# Patient Record
Sex: MALE | Race: OTHER | HISPANIC OR LATINO
[De-identification: names, ages, dates, MRNs, and addresses within clinical notes are randomized per-mention and may not be internally consistent; named-entity substitution may affect disease eponyms.]

---

## 2022-05-31 ENCOUNTER — APPOINTMENT (OUTPATIENT)
Age: 72
End: 2022-05-31

## 2022-05-31 PROBLEM — Z00.00 ENCOUNTER FOR PREVENTIVE HEALTH EXAMINATION: Status: ACTIVE | Noted: 2022-05-31

## 2022-06-01 ENCOUNTER — APPOINTMENT (OUTPATIENT)
Dept: UROLOGY | Facility: CLINIC | Age: 72
End: 2022-06-01
Payer: MEDICARE

## 2022-06-01 VITALS
HEART RATE: 76 BPM | SYSTOLIC BLOOD PRESSURE: 144 MMHG | TEMPERATURE: 98.3 F | WEIGHT: 185 LBS | OXYGEN SATURATION: 99 % | DIASTOLIC BLOOD PRESSURE: 79 MMHG | HEIGHT: 69 IN | BODY MASS INDEX: 27.4 KG/M2

## 2022-06-01 DIAGNOSIS — Z78.9 OTHER SPECIFIED HEALTH STATUS: ICD-10-CM

## 2022-06-01 PROCEDURE — 99204 OFFICE O/P NEW MOD 45 MIN: CPT

## 2022-06-01 NOTE — PHYSICAL EXAM
[General Appearance - Well Developed] : well developed [General Appearance - Well Nourished] : well nourished [Normal Appearance] : normal appearance [Well Groomed] : well groomed [General Appearance - In No Acute Distress] : no acute distress [Edema] : no peripheral edema [Respiration, Rhythm And Depth] : normal respiratory rhythm and effort [Exaggerated Use Of Accessory Muscles For Inspiration] : no accessory muscle use [Abdomen Soft] : soft [Abdomen Tenderness] : non-tender [Costovertebral Angle Tenderness] : no ~M costovertebral angle tenderness [Normal Station and Gait] : the gait and station were normal for the patient's age [] : no rash [No Focal Deficits] : no focal deficits [Oriented To Time, Place, And Person] : oriented to person, place, and time [Affect] : the affect was normal [Mood] : the mood was normal [Not Anxious] : not anxious [No Palpable Adenopathy] : no palpable adenopathy [Urethral Meatus] : meatus normal [Scrotum] : the scrotum was normal [Testes Mass (___cm)] : there were no testicular masses [No Prostate Nodules] : no prostate nodules [FreeTextEntry1] : HARESH slightly enlarged without nodules or induration

## 2022-06-01 NOTE — HISTORY OF PRESENT ILLNESS
[Currently Experiencing ___] :  [unfilled] [Nocturia] : nocturia [5] : 5 [Left Flank] : left flank [Left Lower Back] : left lower back [FreeTextEntry1] : Mr. Eubanks is a very pleasant 71 year old man here today for BPH and left back pain.\par He reports that he has been taking tamsulosin and finasteride prescribed by a different urologist.\par He cannot remember his name.\par He reports that his urologist no longer takes his insurance.\par He reports that his urinary stream it is mostly strong and is rarely weak.\par He denies any dysuria or hematuria.\par Reports nocturia x1-3.\par He reports he has left sided lower back pain for the past 3 months.\par Reports it worsens with relief of pressure to the area, such as when he removes his belt.\par Reports its also relieved with standing.\par He reports it feels like a discomfort.\par He denies any personal history of kidney stones.\par He reports his son has had kidney stones.\par Denies any family history of urological cancers.\par Denies any history of smoking.\par \par

## 2022-06-01 NOTE — ASSESSMENT
[FreeTextEntry1] : Mr. Eubanks is a very pleasant 71 year old man here today for BPH and left back pain.\par He reports that he has been taking tamsulosin and finasteride prescribed by a different urologist.\par He cannot remember his name.\par He reports that his urologist no longer takes his insurance.\par He reports that his urinary stream it is mostly strong and is rarely weak.\par He denies any dysuria or hematuria.\par Reports nocturia x1-3.\par He reports he has left sided lower back pain for the past 3 months.\par Reports it worsens with relief of pressure to the area, such as when he removes his belt.\par Reports its also relieved with standing.\par He reports it feels like a discomfort.\par He denies any personal history of kidney stones.\par He reports his son has had kidney stones.\par Denies any family history of urological cancers.\par Denies any history of smoking.\par HARESH slightly enlarged, no nodules or indurations.\par Would like refills of tamsulosin and finasteride today.\par We discussed risks and benefits of tamsulosin and finasteride at length\par UC.\par UA.\par PSA.\par RTO in 6 months if UA, culture, and PSA are normal

## 2022-06-02 LAB
APPEARANCE: CLEAR
BACTERIA: NEGATIVE
BILIRUBIN URINE: NEGATIVE
BLOOD URINE: ABNORMAL
COLOR: YELLOW
GLUCOSE QUALITATIVE U: NEGATIVE
HYALINE CASTS: 2 /LPF
KETONES URINE: NEGATIVE
LEUKOCYTE ESTERASE URINE: NEGATIVE
MICROSCOPIC-UA: NORMAL
NITRITE URINE: NEGATIVE
PH URINE: 6
PROTEIN URINE: NORMAL
PSA SERPL-MCNC: 4.36 NG/ML
RED BLOOD CELLS URINE: 6 /HPF
SPECIFIC GRAVITY URINE: 1.02
SQUAMOUS EPITHELIAL CELLS: 3 /HPF
UROBILINOGEN URINE: NORMAL
WHITE BLOOD CELLS URINE: 1 /HPF

## 2022-06-03 LAB — BACTERIA UR CULT: NORMAL

## 2022-06-21 ENCOUNTER — APPOINTMENT (OUTPATIENT)
Dept: UROLOGY | Facility: CLINIC | Age: 72
End: 2022-06-21

## 2022-06-21 ENCOUNTER — APPOINTMENT (OUTPATIENT)
Dept: UROLOGY | Facility: CLINIC | Age: 72
End: 2022-06-21
Payer: MEDICARE

## 2022-06-21 DIAGNOSIS — E78.00 PURE HYPERCHOLESTEROLEMIA, UNSPECIFIED: ICD-10-CM

## 2022-06-21 PROCEDURE — 76775 US EXAM ABDO BACK WALL LIM: CPT

## 2022-06-21 PROCEDURE — 99214 OFFICE O/P EST MOD 30 MIN: CPT

## 2022-06-21 NOTE — HISTORY OF PRESENT ILLNESS
[FreeTextEntry1] : Very pleasant 72-year-old gentleman presents for follow-up of BPH, screening for prostate cancer, elevated PSA, microscopic hematuria, large kidney stone.  At last visit he was found to have microscopic hematuria on urinalysis.  Additionally, PSA was noted to be elevated at 4.36.  Urinalysis demonstrated 6 red blood cells per high-powered field.  Urine culture was negative.  Because of this he underwent a renal ultrasound which demonstrates a large left kidney stone.  He reports no history of stones in the past.  No other complaints.

## 2022-06-21 NOTE — ASSESSMENT
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

## 2022-07-12 ENCOUNTER — APPOINTMENT (OUTPATIENT)
Dept: UROLOGY | Facility: CLINIC | Age: 72
End: 2022-07-12

## 2022-07-12 VITALS
WEIGHT: 186 LBS | HEART RATE: 74 BPM | DIASTOLIC BLOOD PRESSURE: 77 MMHG | HEIGHT: 69 IN | OXYGEN SATURATION: 97 % | BODY MASS INDEX: 27.55 KG/M2 | SYSTOLIC BLOOD PRESSURE: 129 MMHG | TEMPERATURE: 97.6 F

## 2022-07-12 PROCEDURE — 99214 OFFICE O/P EST MOD 30 MIN: CPT

## 2022-07-12 NOTE — ASSESSMENT
[FreeTextEntry1] : Very pleasant 72-year-old gentleman presents for follow-up of BPH, elevated PSA, microscopic hematuria, kidney stones\par -CT images reviewed demonstrating a 9 mm and 2.4 cm left intrarenal stone without hydronephrosis, renal masses, or other evidence of obstruction\par -CT also demonstrates a total prostate volume of approximately 195 cc\par -PSA 4.36\par -Discussed option to perform cystoscopy, however he wishes to forego this at this time\par -I highly recommended that he consider treatment for a large left kidney stone.  We will discussed the risks of untreated kidney stones.  I have provided him with a recommendation for provider to discuss PCNL.  We discussed differences between PCNL, ESWL, ureteroscopy with laser lithotripsy.  After thorough discussion of risks benefits of each, he is interested in a PCNL  given the significant size of the stones

## 2022-07-12 NOTE — HISTORY OF PRESENT ILLNESS
[FreeTextEntry1] : Very pleasant 72-year-old gentleman presents for follow-up of BPH, elevated PSA, large left kidney stones.  At last visit he was found to have microscopic hematuria on urinalysis.  Additionally, PSA was noted to be elevated at 4.36.  Urinalysis demonstrated 6 red blood cells per high-powered field.  Urine culture was negative.  Because of this he underwent a renal ultrasound which demonstrated a large left kidney stone.  He reports no history of stones in the past.  No other complaints.\par \par Recent CT scan demonstrated 2 left intrarenal stones measuring 9 mm and 2.4 cm.  He also demonstrated a grossly enlarged prostate with a volume of approximately 195 cc

## 2022-09-15 RX ADMIN — OXYCODONE HYDROCHLORIDE 10 MILLIGRAM(S): 5 TABLET ORAL at 17:08

## 2022-09-22 ENCOUNTER — INPATIENT (INPATIENT)
Facility: HOSPITAL | Age: 72
LOS: 11 days | Discharge: HOME HEALTH | DRG: 233 | End: 2022-10-04
Attending: STUDENT IN AN ORGANIZED HEALTH CARE EDUCATION/TRAINING PROGRAM | Admitting: STUDENT IN AN ORGANIZED HEALTH CARE EDUCATION/TRAINING PROGRAM
Payer: COMMERCIAL

## 2022-09-22 ENCOUNTER — EMERGENCY (EMERGENCY)
Facility: HOSPITAL | Age: 72
LOS: 1 days | Discharge: ANOTHER HOSPITAL | End: 2022-09-22
Attending: EMERGENCY MEDICINE
Payer: COMMERCIAL

## 2022-09-22 VITALS
RESPIRATION RATE: 16 BRPM | SYSTOLIC BLOOD PRESSURE: 134 MMHG | HEART RATE: 56 BPM | TEMPERATURE: 98 F | OXYGEN SATURATION: 94 % | DIASTOLIC BLOOD PRESSURE: 80 MMHG

## 2022-09-22 VITALS
TEMPERATURE: 98 F | RESPIRATION RATE: 16 BRPM | HEART RATE: 70 BPM | WEIGHT: 187.39 LBS | OXYGEN SATURATION: 96 % | HEIGHT: 69 IN | SYSTOLIC BLOOD PRESSURE: 164 MMHG | DIASTOLIC BLOOD PRESSURE: 91 MMHG

## 2022-09-22 DIAGNOSIS — R73.9 HYPERGLYCEMIA, UNSPECIFIED: ICD-10-CM

## 2022-09-22 DIAGNOSIS — Z29.9 ENCOUNTER FOR PROPHYLACTIC MEASURES, UNSPECIFIED: ICD-10-CM

## 2022-09-22 DIAGNOSIS — I10 ESSENTIAL (PRIMARY) HYPERTENSION: ICD-10-CM

## 2022-09-22 DIAGNOSIS — I21.4 NON-ST ELEVATION (NSTEMI) MYOCARDIAL INFARCTION: ICD-10-CM

## 2022-09-22 DIAGNOSIS — Z87.438 PERSONAL HISTORY OF OTHER DISEASES OF MALE GENITAL ORGANS: ICD-10-CM

## 2022-09-22 LAB
ALBUMIN SERPL ELPH-MCNC: 3.4 G/DL — LOW (ref 3.5–5)
ALBUMIN SERPL ELPH-MCNC: 4.2 G/DL — SIGNIFICANT CHANGE UP (ref 3.3–5)
ALP SERPL-CCNC: 50 U/L — SIGNIFICANT CHANGE UP (ref 40–120)
ALP SERPL-CCNC: 53 U/L — SIGNIFICANT CHANGE UP (ref 40–120)
ALT FLD-CCNC: 17 U/L — SIGNIFICANT CHANGE UP (ref 10–45)
ALT FLD-CCNC: 21 U/L DA — SIGNIFICANT CHANGE UP (ref 10–60)
ANION GAP SERPL CALC-SCNC: 10 MMOL/L — SIGNIFICANT CHANGE UP (ref 5–17)
ANION GAP SERPL CALC-SCNC: 6 MMOL/L — SIGNIFICANT CHANGE UP (ref 5–17)
APTT BLD: 31.7 SEC — SIGNIFICANT CHANGE UP (ref 27.5–35.5)
APTT BLD: >200 SEC — CRITICAL HIGH (ref 27.5–35.5)
AST SERPL-CCNC: 53 U/L — HIGH (ref 10–40)
AST SERPL-CCNC: 56 U/L — HIGH (ref 10–40)
BASOPHILS # BLD AUTO: 0.01 K/UL — SIGNIFICANT CHANGE UP (ref 0–0.2)
BASOPHILS # BLD AUTO: 0.01 K/UL — SIGNIFICANT CHANGE UP (ref 0–0.2)
BASOPHILS NFR BLD AUTO: 0.1 % — SIGNIFICANT CHANGE UP (ref 0–2)
BASOPHILS NFR BLD AUTO: 0.1 % — SIGNIFICANT CHANGE UP (ref 0–2)
BILIRUB SERPL-MCNC: 0.7 MG/DL — SIGNIFICANT CHANGE UP (ref 0.2–1.2)
BILIRUB SERPL-MCNC: 0.8 MG/DL — SIGNIFICANT CHANGE UP (ref 0.2–1.2)
BUN SERPL-MCNC: 14 MG/DL — SIGNIFICANT CHANGE UP (ref 7–23)
BUN SERPL-MCNC: 15 MG/DL — SIGNIFICANT CHANGE UP (ref 7–18)
CALCIUM SERPL-MCNC: 8.5 MG/DL — SIGNIFICANT CHANGE UP (ref 8.4–10.5)
CALCIUM SERPL-MCNC: 8.9 MG/DL — SIGNIFICANT CHANGE UP (ref 8.4–10.5)
CHLORIDE SERPL-SCNC: 106 MMOL/L — SIGNIFICANT CHANGE UP (ref 96–108)
CHLORIDE SERPL-SCNC: 111 MMOL/L — HIGH (ref 96–108)
CK SERPL-CCNC: 475 U/L — HIGH (ref 35–232)
CO2 SERPL-SCNC: 25 MMOL/L — SIGNIFICANT CHANGE UP (ref 22–31)
CO2 SERPL-SCNC: 26 MMOL/L — SIGNIFICANT CHANGE UP (ref 22–31)
CREAT SERPL-MCNC: 1.01 MG/DL — SIGNIFICANT CHANGE UP (ref 0.5–1.3)
CREAT SERPL-MCNC: 1.17 MG/DL — SIGNIFICANT CHANGE UP (ref 0.5–1.3)
EGFR: 65 ML/MIN/1.73M2 — SIGNIFICANT CHANGE UP
EGFR: 78 ML/MIN/1.73M2 — SIGNIFICANT CHANGE UP
EOSINOPHIL # BLD AUTO: 0 K/UL — SIGNIFICANT CHANGE UP (ref 0–0.5)
EOSINOPHIL # BLD AUTO: 0 K/UL — SIGNIFICANT CHANGE UP (ref 0–0.5)
EOSINOPHIL NFR BLD AUTO: 0 % — SIGNIFICANT CHANGE UP (ref 0–6)
EOSINOPHIL NFR BLD AUTO: 0 % — SIGNIFICANT CHANGE UP (ref 0–6)
FLUAV AG NPH QL: SIGNIFICANT CHANGE UP
FLUBV AG NPH QL: SIGNIFICANT CHANGE UP
GLUCOSE SERPL-MCNC: 141 MG/DL — HIGH (ref 70–99)
GLUCOSE SERPL-MCNC: 165 MG/DL — HIGH (ref 70–99)
HCT VFR BLD CALC: 41.7 % — SIGNIFICANT CHANGE UP (ref 39–50)
HCT VFR BLD CALC: 43.5 % — SIGNIFICANT CHANGE UP (ref 39–50)
HGB BLD-MCNC: 14 G/DL — SIGNIFICANT CHANGE UP (ref 13–17)
HGB BLD-MCNC: 14.8 G/DL — SIGNIFICANT CHANGE UP (ref 13–17)
IMM GRANULOCYTES NFR BLD AUTO: 0.3 % — SIGNIFICANT CHANGE UP (ref 0–0.9)
IMM GRANULOCYTES NFR BLD AUTO: 0.3 % — SIGNIFICANT CHANGE UP (ref 0–0.9)
INR BLD: 1 RATIO — SIGNIFICANT CHANGE UP (ref 0.88–1.16)
INR BLD: 1.21 RATIO — HIGH (ref 0.88–1.16)
LIDOCAIN IGE QN: 72 U/L — LOW (ref 73–393)
LYMPHOCYTES # BLD AUTO: 1.64 K/UL — SIGNIFICANT CHANGE UP (ref 1–3.3)
LYMPHOCYTES # BLD AUTO: 2.14 K/UL — SIGNIFICANT CHANGE UP (ref 1–3.3)
LYMPHOCYTES # BLD AUTO: 23.9 % — SIGNIFICANT CHANGE UP (ref 13–44)
LYMPHOCYTES # BLD AUTO: 30.3 % — SIGNIFICANT CHANGE UP (ref 13–44)
MAGNESIUM SERPL-MCNC: 2.1 MG/DL — SIGNIFICANT CHANGE UP (ref 1.6–2.6)
MCHC RBC-ENTMCNC: 31.3 PG — SIGNIFICANT CHANGE UP (ref 27–34)
MCHC RBC-ENTMCNC: 31.9 PG — SIGNIFICANT CHANGE UP (ref 27–34)
MCHC RBC-ENTMCNC: 33.6 GM/DL — SIGNIFICANT CHANGE UP (ref 32–36)
MCHC RBC-ENTMCNC: 34 GM/DL — SIGNIFICANT CHANGE UP (ref 32–36)
MCV RBC AUTO: 93.1 FL — SIGNIFICANT CHANGE UP (ref 80–100)
MCV RBC AUTO: 93.8 FL — SIGNIFICANT CHANGE UP (ref 80–100)
MONOCYTES # BLD AUTO: 0.53 K/UL — SIGNIFICANT CHANGE UP (ref 0–0.9)
MONOCYTES # BLD AUTO: 0.66 K/UL — SIGNIFICANT CHANGE UP (ref 0–0.9)
MONOCYTES NFR BLD AUTO: 7.7 % — SIGNIFICANT CHANGE UP (ref 2–14)
MONOCYTES NFR BLD AUTO: 9.3 % — SIGNIFICANT CHANGE UP (ref 2–14)
NEUTROPHILS # BLD AUTO: 4.24 K/UL — SIGNIFICANT CHANGE UP (ref 1.8–7.4)
NEUTROPHILS # BLD AUTO: 4.65 K/UL — SIGNIFICANT CHANGE UP (ref 1.8–7.4)
NEUTROPHILS NFR BLD AUTO: 60 % — SIGNIFICANT CHANGE UP (ref 43–77)
NEUTROPHILS NFR BLD AUTO: 68 % — SIGNIFICANT CHANGE UP (ref 43–77)
NRBC # BLD: 0 /100 WBCS — SIGNIFICANT CHANGE UP (ref 0–0)
NRBC # BLD: 0 /100 WBCS — SIGNIFICANT CHANGE UP (ref 0–0)
NT-PROBNP SERPL-SCNC: 605 PG/ML — HIGH (ref 0–450)
PLATELET # BLD AUTO: 169 K/UL — SIGNIFICANT CHANGE UP (ref 150–400)
PLATELET # BLD AUTO: 169 K/UL — SIGNIFICANT CHANGE UP (ref 150–400)
POTASSIUM SERPL-MCNC: 3.7 MMOL/L — SIGNIFICANT CHANGE UP (ref 3.5–5.3)
POTASSIUM SERPL-MCNC: 3.8 MMOL/L — SIGNIFICANT CHANGE UP (ref 3.5–5.3)
POTASSIUM SERPL-SCNC: 3.7 MMOL/L — SIGNIFICANT CHANGE UP (ref 3.5–5.3)
POTASSIUM SERPL-SCNC: 3.8 MMOL/L — SIGNIFICANT CHANGE UP (ref 3.5–5.3)
PROT SERPL-MCNC: 6.4 G/DL — SIGNIFICANT CHANGE UP (ref 6–8.3)
PROT SERPL-MCNC: 6.7 G/DL — SIGNIFICANT CHANGE UP (ref 6–8.3)
PROTHROM AB SERPL-ACNC: 11.9 SEC — SIGNIFICANT CHANGE UP (ref 10.5–13.4)
PROTHROM AB SERPL-ACNC: 13.9 SEC — HIGH (ref 10.5–13.4)
RBC # BLD: 4.48 M/UL — SIGNIFICANT CHANGE UP (ref 4.2–5.8)
RBC # BLD: 4.64 M/UL — SIGNIFICANT CHANGE UP (ref 4.2–5.8)
RBC # FLD: 11.2 % — SIGNIFICANT CHANGE UP (ref 10.3–14.5)
RBC # FLD: 11.2 % — SIGNIFICANT CHANGE UP (ref 10.3–14.5)
RSV RNA NPH QL NAA+NON-PROBE: SIGNIFICANT CHANGE UP
SARS-COV-2 RNA SPEC QL NAA+PROBE: SIGNIFICANT CHANGE UP
SARS-COV-2 RNA SPEC QL NAA+PROBE: SIGNIFICANT CHANGE UP
SODIUM SERPL-SCNC: 141 MMOL/L — SIGNIFICANT CHANGE UP (ref 135–145)
SODIUM SERPL-SCNC: 143 MMOL/L — SIGNIFICANT CHANGE UP (ref 135–145)
TROPONIN I, HIGH SENSITIVITY RESULT: 6451.2 NG/L — HIGH
TROPONIN T, HIGH SENSITIVITY RESULT: 618 NG/L — HIGH (ref 0–51)
WBC # BLD: 6.85 K/UL — SIGNIFICANT CHANGE UP (ref 3.8–10.5)
WBC # BLD: 7.07 K/UL — SIGNIFICANT CHANGE UP (ref 3.8–10.5)
WBC # FLD AUTO: 6.85 K/UL — SIGNIFICANT CHANGE UP (ref 3.8–10.5)
WBC # FLD AUTO: 7.07 K/UL — SIGNIFICANT CHANGE UP (ref 3.8–10.5)

## 2022-09-22 PROCEDURE — 99223 1ST HOSP IP/OBS HIGH 75: CPT

## 2022-09-22 PROCEDURE — 93010 ELECTROCARDIOGRAM REPORT: CPT

## 2022-09-22 PROCEDURE — 99292 CRITICAL CARE ADDL 30 MIN: CPT

## 2022-09-22 PROCEDURE — 99291 CRITICAL CARE FIRST HOUR: CPT

## 2022-09-22 PROCEDURE — 71045 X-RAY EXAM CHEST 1 VIEW: CPT | Mod: 26

## 2022-09-22 RX ORDER — CLOPIDOGREL BISULFATE 75 MG/1
75 TABLET, FILM COATED ORAL ONCE
Refills: 0 | Status: COMPLETED | OUTPATIENT
Start: 2022-09-22 | End: 2022-09-22

## 2022-09-22 RX ORDER — SODIUM CHLORIDE 9 MG/ML
1000 INJECTION INTRAMUSCULAR; INTRAVENOUS; SUBCUTANEOUS
Refills: 0 | Status: DISCONTINUED | OUTPATIENT
Start: 2022-09-22 | End: 2022-09-27

## 2022-09-22 RX ORDER — NITROGLYCERIN 6.5 MG
0.4 CAPSULE, EXTENDED RELEASE ORAL ONCE
Refills: 0 | Status: COMPLETED | OUTPATIENT
Start: 2022-09-22 | End: 2022-09-22

## 2022-09-22 RX ORDER — DEXTROSE 50 % IN WATER 50 %
25 SYRINGE (ML) INTRAVENOUS ONCE
Refills: 0 | Status: DISCONTINUED | OUTPATIENT
Start: 2022-09-22 | End: 2022-09-23

## 2022-09-22 RX ORDER — INSULIN LISPRO 100/ML
VIAL (ML) SUBCUTANEOUS EVERY 6 HOURS
Refills: 0 | Status: DISCONTINUED | OUTPATIENT
Start: 2022-09-22 | End: 2022-09-23

## 2022-09-22 RX ORDER — DEXTROSE 50 % IN WATER 50 %
15 SYRINGE (ML) INTRAVENOUS ONCE
Refills: 0 | Status: DISCONTINUED | OUTPATIENT
Start: 2022-09-22 | End: 2022-09-23

## 2022-09-22 RX ORDER — ASPIRIN/CALCIUM CARB/MAGNESIUM 324 MG
162 TABLET ORAL ONCE
Refills: 0 | Status: COMPLETED | OUTPATIENT
Start: 2022-09-22 | End: 2022-09-22

## 2022-09-22 RX ORDER — HEPARIN SODIUM 5000 [USP'U]/ML
INJECTION INTRAVENOUS; SUBCUTANEOUS
Qty: 25000 | Refills: 0 | Status: DISCONTINUED | OUTPATIENT
Start: 2022-09-22 | End: 2022-09-26

## 2022-09-22 RX ORDER — GLUCAGON INJECTION, SOLUTION 0.5 MG/.1ML
1 INJECTION, SOLUTION SUBCUTANEOUS ONCE
Refills: 0 | Status: DISCONTINUED | OUTPATIENT
Start: 2022-09-22 | End: 2022-09-23

## 2022-09-22 RX ORDER — HEPARIN SODIUM 5000 [USP'U]/ML
INJECTION INTRAVENOUS; SUBCUTANEOUS
Qty: 25000 | Refills: 0 | Status: DISCONTINUED | OUTPATIENT
Start: 2022-09-22 | End: 2022-09-24

## 2022-09-22 RX ORDER — SODIUM CHLORIDE 9 MG/ML
1000 INJECTION, SOLUTION INTRAVENOUS
Refills: 0 | Status: DISCONTINUED | OUTPATIENT
Start: 2022-09-22 | End: 2022-09-23

## 2022-09-22 RX ORDER — ASPIRIN/CALCIUM CARB/MAGNESIUM 324 MG
81 TABLET ORAL DAILY
Refills: 0 | Status: DISCONTINUED | OUTPATIENT
Start: 2022-09-23 | End: 2022-09-27

## 2022-09-22 RX ORDER — HEPARIN SODIUM 5000 [USP'U]/ML
5000 INJECTION INTRAVENOUS; SUBCUTANEOUS EVERY 6 HOURS
Refills: 0 | Status: DISCONTINUED | OUTPATIENT
Start: 2022-09-22 | End: 2022-09-24

## 2022-09-22 RX ORDER — HEPARIN SODIUM 5000 [USP'U]/ML
5000 INJECTION INTRAVENOUS; SUBCUTANEOUS ONCE
Refills: 0 | Status: COMPLETED | OUTPATIENT
Start: 2022-09-22 | End: 2022-09-22

## 2022-09-22 RX ORDER — DEXTROSE 50 % IN WATER 50 %
12.5 SYRINGE (ML) INTRAVENOUS ONCE
Refills: 0 | Status: DISCONTINUED | OUTPATIENT
Start: 2022-09-22 | End: 2022-09-23

## 2022-09-22 RX ORDER — TAMSULOSIN HYDROCHLORIDE 0.4 MG/1
0.4 CAPSULE ORAL AT BEDTIME
Refills: 0 | Status: DISCONTINUED | OUTPATIENT
Start: 2022-09-22 | End: 2022-09-27

## 2022-09-22 RX ORDER — ASPIRIN/CALCIUM CARB/MAGNESIUM 324 MG
81 TABLET ORAL
Refills: 0 | Status: DISCONTINUED | OUTPATIENT
Start: 2022-09-22 | End: 2022-09-22

## 2022-09-22 RX ORDER — HEPARIN SODIUM 5000 [USP'U]/ML
5000 INJECTION INTRAVENOUS; SUBCUTANEOUS EVERY 6 HOURS
Refills: 0 | Status: DISCONTINUED | OUTPATIENT
Start: 2022-09-22 | End: 2022-09-26

## 2022-09-22 RX ORDER — FINASTERIDE 5 MG/1
5 TABLET, FILM COATED ORAL DAILY
Refills: 0 | Status: DISCONTINUED | OUTPATIENT
Start: 2022-09-22 | End: 2022-09-27

## 2022-09-22 RX ORDER — HEPARIN SODIUM 5000 [USP'U]/ML
5000 INJECTION INTRAVENOUS; SUBCUTANEOUS EVERY 6 HOURS
Refills: 0 | Status: DISCONTINUED | OUTPATIENT
Start: 2022-09-22 | End: 2022-09-22

## 2022-09-22 RX ORDER — LANOLIN ALCOHOL/MO/W.PET/CERES
3 CREAM (GRAM) TOPICAL AT BEDTIME
Refills: 0 | Status: DISCONTINUED | OUTPATIENT
Start: 2022-09-22 | End: 2022-09-27

## 2022-09-22 RX ADMIN — Medication 162 MILLIGRAM(S): at 18:38

## 2022-09-22 RX ADMIN — HEPARIN SODIUM 1000 UNIT(S)/HR: 5000 INJECTION INTRAVENOUS; SUBCUTANEOUS at 18:08

## 2022-09-22 RX ADMIN — CLOPIDOGREL BISULFATE 75 MILLIGRAM(S): 75 TABLET, FILM COATED ORAL at 18:10

## 2022-09-22 RX ADMIN — HEPARIN SODIUM 5000 UNIT(S): 5000 INJECTION INTRAVENOUS; SUBCUTANEOUS at 18:06

## 2022-09-22 RX ADMIN — HEPARIN SODIUM 1000 UNIT(S)/HR: 5000 INJECTION INTRAVENOUS; SUBCUTANEOUS at 23:51

## 2022-09-22 RX ADMIN — Medication 0.4 MILLIGRAM(S): at 18:38

## 2022-09-22 RX ADMIN — Medication 162 MILLIGRAM(S): at 16:15

## 2022-09-22 NOTE — H&P ADULT - HISTORY OF PRESENT ILLNESS
Patient is a 72y Male with PMH of BPH, HTN, presented to OSH with chest pain x1 day, transferred to [facility] for NSTEMI. History obtained Patient was woken up last night around midnight with sudden-onset left-sided chest pain. The pain did not radiate anywhere else, was not associated with SOB or diaphoresis. He did have some mild dizziness. At OSH he was loaded with aspirin and plavix and received nitroglycerin x2, chest pain is now resolved. Patient denies fever, chills, abdominal pain, nausea, vomiting, changes in bowel habits, or urinary symptoms. He has no personal or family history of heart problems, never smoked cigarettes.     In the ED, afebrile, VSS. Labs significant for trop 618. CXR shows mild atelectasis at the lung bases b/l. Patient is a 72y Male with PMH of BPH, HTN, presented to OSH with chest pain x1 day, transferred to [facility] for NSTEMI. History obtained from patient using  (PI #[id number]). Patient was woken up last night around midnight with sudden-onset left-sided chest pain. The pain did not radiate anywhere else, was not associated with SOB or diaphoresis. He did have some mild dizziness. At OSH he was loaded with aspirin and plavix and received nitroglycerin x2, chest pain is now resolved. Patient denies fever, chills, abdominal pain, nausea, vomiting, changes in bowel habits, or urinary symptoms. He has no personal or family history of heart problems, never smoked cigarettes.     In the ED, afebrile, VSS. Labs significant for trop 618. CXR shows mild atelectasis at the lung bases b/l. Started on heparin gtt.

## 2022-09-22 NOTE — ED PROVIDER NOTE - PHYSICAL EXAMINATION
PHYSICAL EXAMINATION:  GENERAL: NAD, well-developed, well-groomed  HEAD:  Atraumatic, Normocephalic  EYES:  conjunctiva and sclera clear  NECK: Supple, No JVD, Normal thyroid  CHEST/LUNG: Clear to auscultation. Clear to percussion bilaterally; No rales, rhonchi, wheezing, or rubs  HEART: Regular rate and rhythm; No murmurs, rubs, or gallops  ABDOMEN: Soft, Nontender, Nondistended; Bowel sounds present  NERVOUS SYSTEM:  Alert & Oriented X3,    EXTREMITIES: +1 edema in B/L LL 2+ Peripheral Pulses, No clubbing, or cyanosis   SKIN: warm dry

## 2022-09-22 NOTE — CONSULT NOTE ADULT - ASSESSMENT
Patient is a 72y M PMHx BPH p/w chest pain, transferred from  for concern of NSTEMI. He has elevated troponin and normal EKG. Now patient's chest pain is resolved.     #NSTEMI  Patient is hemodynamically stable and chest pain free. His EKG is not concerning for any ST elevation  -Continue with herparin drip ACS protocol  -Patient was loaded with  and plavix 600mg  -Continue patient on ASA 81mg daily  -Will discuss with interventional attending concerning cath in the morning  -Please keep patient NPO at midnight  -Please obtain type and screen, pre-op labs    Marva Cortes MD  Cardiology fellow Patient is a 72y M PMHx BPH p/w chest pain, transferred from  for concern of NSTEMI. He has elevated troponin and normal EKG. Now patient's chest pain is resolved.     #NSTEMI  Patient is hemodynamically stable and chest pain free. His EKG is not concerning for any ST elevation  -Continue with heparin drip ACS protocol  -Patient was loaded with  and plavix 600mg  -Continue patient on ASA 81mg daily  -Will discuss with interventional attending concerning cath in the morning  -Please keep patient NPO at midnight  -Please obtain type and screen, pre-op labs  -Trend trop q6 hours until downtrend or peak    Marva Cortes MD  Cardiology fellow

## 2022-09-22 NOTE — ED PROVIDER NOTE - ATTENDING CONTRIBUTION TO CARE
72 y.o. male 72 y.o. male c/o constant nonradiating MSChest pressure since 11am yest., sob, no diaphoresis, dizziness, n/v, pt's vaccinated for COVID.  Pt had similar pain 1 mo. ago 8/21, did not seek any med. advice.  No FH CAD  PE: in no distress, Heart- S1S2RR, Lungs-clear, abd-soft, NT, ND, Ext- no C/E/C, no calves tenderness   Pt with chest pressure, will get EKG, Trop, reassess

## 2022-09-22 NOTE — ED ADULT NURSE NOTE - NSIMPLEMENTINTERV_GEN_ALL_ED
Implemented All Universal Safety Interventions:  [place] to call system. Call bell, personal items and telephone within reach. Instruct patient to call for assistance. Room bathroom lighting operational. Non-slip footwear when patient is off stretcher. Physically safe environment: no spills, clutter or unnecessary equipment. Stretcher in lowest position, wheels locked, appropriate side rails in place.

## 2022-09-22 NOTE — ED PROVIDER NOTE - ATTENDING CONTRIBUTION TO CARE
Patient is a 73 yo M with history of BPH, history of hernia repair, transferred from [place] for cardiac cath evaluation as patient is having an NSTEMI. Patient is [place] speaking and I spoke to him via , [phone]. Accordingly, patient states he developed chest pain last night around 10-11 pm. Chest pain was like a tightness, lasted the whole night. No nausea or diaphoresis. Denies a history of chest pain with exertion, HADLEY, leg swelling. At , his troponin I was 6451.2 and CK was 475. He received aspirin, plavix, sublingual NG and was started on heparin. Cardiology fellow was contacted for transfer. Patient reports overall improvement in his pain in the last few hours. Denies any recent infections. No recent fevers, chills, nausea, vomiting, or diarrhea.    VS noted  Gen. no acute distress, Non toxic   HEENT: EOMI, mmm  Lungs: CTAB/L no C/ W /R   CVS: RRR   Abd; Soft non tender, non distended   Ext: no edema  Skin: no rash  Neuro AAOx3 non focal clear speech  a/p: NSTEMI - ekg here with twi in lead 3 otherwise no st elevations or depressions. Patient with stable VS. He is receiving heparin. Plan for repeat labs, cardiology consult. SOILA.   Shea Flowers MD

## 2022-09-22 NOTE — H&P ADULT - PROBLEM SELECTOR PLAN 1
- trop 618, EKG w/ NSR and no ischemic changes, currently CP resolved  - appreciate cards recs: Will discuss with interventional attending concerning cath in the morning  - s/p aspirin/plavix load at OSH  - c/w aspirin   - hold plavix for now as per cards  - c/w heparin gtt  - trend troponins to peak  - check LFTs, lipid profile, TSH, and A1c  - maintain K > 4, Mg > 2  - continue to monitor on telemetry  - TTE in the AM  - NPO for possible cath in AM  - gentle IVF while NPO

## 2022-09-22 NOTE — ED PROVIDER NOTE - CLINICAL SUMMARY MEDICAL DECISION MAKING FREE TEXT BOX
Patient is a 72y M PMHx BPH p/w chest pain, transferred from  with NSTEMI, trop 6500. Cardiology consulted, will see patient at bedside. Will repeat labs, trop, ekg. CXR at OSH shows no PNA, no PTX, no widened mediastinum. Patient on heparin.

## 2022-09-22 NOTE — ED PROVIDER NOTE - CHPI ED SYMPTOMS NEG
no back pain/no cough/no fever/no nausea/no shortness of breath/no syncope/no vomiting/no chills/no diaphoresis

## 2022-09-22 NOTE — CONSULT NOTE ADULT - ATTENDING COMMENTS
72 year old man with NSTEMI, chest pain currently resolved, troponin rising, EKG non-specific, suggestive of inferolateral ischemia. Plan coronary angiogram early today.    To contact call Cardiology Fellow or Attending as listed on [email or web address] password: [email or web address].

## 2022-09-22 NOTE — H&P ADULT - NSHPLABSRESULTS_GEN_ALL_CORE
EKG personally reviewed by me: NSR, no ischemic changes.  Imaging personally reviewed by me including:      - CXR: grossly clear, mild atelectasis at bases

## 2022-09-22 NOTE — ED PROVIDER NOTE - PROGRESS NOTE DETAILS
pt with NSTEMI, will transfer pt.  Given NTG CP 7-4, Pt's Trop 6451, will transfer pt.  Case d/w cardio fellow Dr. Cortes, will transfer to ED

## 2022-09-22 NOTE — H&P ADULT - ASSESSMENT
Patient is a 72y Male with PMH of BPH, HTN, presented to OSH with chest pain x1 day, found to have elevated troponins w/ unremarkable EKG, transferred to [facility] for NSTEMI.

## 2022-09-22 NOTE — H&P ADULT - NSHPPHYSICALEXAM_GEN_ALL_CORE
T(C): 36.8 (09-22-22 @ 19:51), Max: 36.8 (09-22-22 @ 18:45)  HR: 60 (09-22-22 @ 19:51) (56 - 60)  BP: 133/84 (09-22-22 @ 19:51) (133/84 - 134/80)  RR: 17 (09-22-22 @ 19:51) (16 - 17)  SpO2: 96% (09-22-22 @ 19:51) (94% - 96%)    Constitutional: no acute distress, laying in bed comfortably  ENMT: atraumatic, normocephalic, no lymphadenopathy  Eyes: pupils equally round and reactive to light, extraocular muscles intact, no conjunctival injection  Cardio: regular rate and rhythm, normal S1/S2, no murmurs, rubs, or gallops  Respiratory: lungs clear to auscultation bilaterally, no rales, rhonchi, or wheezes  GI: soft, nontender, nondistended, BSx4  MSK: extremities atraumatic, no cyanosis or clubbing  Skin: warm, dry, no rashes or lesions  Neuro: no focal deficits, sensation grossly intact  Psych: alert and oriented x3, normal behavior, appropriate mood and affect

## 2022-09-22 NOTE — ED PROVIDER NOTE - PHYSICAL EXAMINATION
GENERAL: no acute distress, non-toxic appearing  HEAD: normocephalic, atraumatic  HEENT: PERRLA, EOMI  CARDIAC: regular rate and rhythm, no appreciable murmurs  PULM: clear to ascultation bilaterally, no crackles, rales, rhonchi, or wheezing  GI: abdomen nondistended, soft, nontender  NEURO: alert and oriented x 3, normal speech, no gross neurologic deficit  MSK: no visible deformities, no peripheral edema, calf tenderness/redness/swelling  SKIN: no visible rashes, dry, well-perfused  PSYCH: appropriate mood and affect

## 2022-09-22 NOTE — ED PROVIDER NOTE - OBJECTIVE STATEMENT
Patient is a 72y M PMHx BPH p/w chest pain, transferred from  with NSTEMI, trop 6500. Patient states CP started last night, now resolving 3/10 pain. Patient received full ASA, plavix, one dose nitro at . Started on heparin, currently on heparin in room. Patient denies SOB, fevers, pleuritic chest pain, numbness, back pain, abdominal pain, vomiting, recent illness.

## 2022-09-22 NOTE — ED PROVIDER NOTE - PROGRESS NOTE DETAILS
Deja Guo MD PGY2: Cardiology aware. Recommend second nitro dose. Will see patient at bedside. Patient to be placed on cardiac monitor. Deja Guo MD PGY2: Cardiology saw patient at bedside. Patient TBA to hospitalist.

## 2022-09-22 NOTE — H&P ADULT - NSHPREVIEWOFSYSTEMS_GEN_ALL_CORE
General: no weight change, no fever, no chills, no night sweats, no fatigue  Skin: no rash, no itching, no dryness, no hair loss  Opthalmologic: no eye pain, no eye redness, no eye swelling, no vision changes  ENMT: no hearing changes, no ear pain, no tinnitus, no vertigo, no nasal congestion, no sore throat, no dysphagia  Respiratory and thorax: no shortness of breath, no cough, no wheezing, no hemoptysis, no pleuritic chest pain  Cardiovascular: + chest pain, no dyspnea on exertion, no orthopnea, no palpitations, no peripheral edema  Gastrointestinal: no abdominal pain, no nausea, no vomiting, no diarrhea, no constipation  Genitourinary: no dysuria, no urinary frequency or hesitancy, no hematuria  Musculoskeletal: no pain, no lower extremity edema, no traumatic injury  Neurological: no weakness, no numbness, no loss of consciousness, no syncope, + dizziness, no headache  Psychiatric: no depression, no anxiety, no mood swings  Endo: no heat or cold intolerance, no hirsutism, no polyuria, no polydipsia

## 2022-09-22 NOTE — ED ADULT NURSE NOTE - OBJECTIVE STATEMENT
Patient is a 72y M PMHx BPH c/c transferred from  with NSTEMI, trop 6500. Patient states CP started last night, now resolving 1/10 pain. Patient received full ASA, Plavix, one dose nitro at . Started on heparin, currently on heparin in room. Heparin started at 1820 at 1000units/hr. Patient denies CP, SOB, fevers, pleuritic chest pain. Placed on cardiac monitor.

## 2022-09-22 NOTE — ED PROVIDER NOTE - CHIEF COMPLAINT
HPI


Chief Complaint:  Flank/Kidney Pain


Time Seen by Provider:  20:12


Travel History


International Travel<30 days:  No


Contact w/Intl Traveler<30days:  No


Traveled to known affect area:  No





History of Present Illness


HPI


39-year-old male with history of diabetes presents for evaluation of abdominal/

left flank pain.  Symptoms started earlier today.  The pain is a sharp pain 

that seems to radiate from the left flank down to the left lower quadrant.  

Pain comes and goes in waves.  No obvious aggravating or relieving factors.  He 

denies nausea, vomiting, diarrhea, constipation, dysuria, testicular scrotal 

pain, fevers or chills.  He reports that earlier today when he was urinating 

was brown/red tinged but the last time that he urinated in order to make sample 

here it was yellow.  No history of kidney stones.  No other complaints at this 

time.





PFSH


Past Medical History


Hx Anticoagulant Therapy:  No


Arthritis:  No


Asthma:  Yes (COMES AND GOES)


Autoimmune Disease:  No


Bipolar Disorder:  Yes


Anxiety:  Yes


Depression:  Yes


Heart Rhythm Problems:  No


Cancer:  No


Cardiovascular Problems:  No


High Cholesterol:  No


Chemotherapy:  No


Chest Pain:  No


Congestive Heart Failure:  No


COPD:  No


Cerebrovascular Accident:  No


Diabetes:  Yes


Diminished Hearing:  No


Endocrine:  Yes (pancreatitis)


Gastrointestinal Disorders:  Yes (HX OF ULCERS)


GERD:  Yes


Genitourinary:  No


Headaches:  No


Hiatal Hernia:  No


Heparin Induced Thrombocytopen:  No


Hypertension:  No


Immune Disorder:  No


Implanted Vascular Access Dvce:  No


Kidney Stones:  No


Musculoskeletal:  No


Neurologic:  Yes


Psychiatric:  Yes (dyslexia)


Reproductive:  No


Respiratory:  No


Migraines:  No


Pancreatitis:  Yes


Radiation Therapy:  No


Renal Failure:  No


Seizures:  Yes


Sickle Cell Disease:  No


Sleep Apnea:  No


Ulcer:  Yes





Past Surgical History


Abdominal Surgery:  No


AICD:  No


Arteriovenous Shunt:  No


Cardiac Surgery:  No


Ear Surgery:  No


Endocrine Surgery:  No


Eye Surgery:  No


Genitourinary Surgery:  No


Gynecologic Surgery:  No


Hysterectomy:  No


Insulin Pump:  No


Joint Replacement:  No


Neurologic Surgery:  No


Pacemaker:  No


Thoracic Surgery:  No


Other Surgery:  No





Social History


Alcohol Use:  No (RARE)


Tobacco Use:  No


Substance Use:  Yes (WEED ONCE IN A WHILE )





Allergies-Medications


(Allergen,Severity, Reaction):  


Coded Allergies:  


     Pork/Porcine Containing Products (Verified  Allergy, Severe, ALLERGIC TO 

PORK INSULIN ONLY, 6/15/18)


     insulin aspart (Verified  Allergy, Severe, ITCHING, RASH TO PORK INSULIN 

ONLY, 6/15/18)


     insulin aspart protamine human (Verified  Allergy, Severe, ITCHING, RASH 

TO PORK INSULIN ONLY, 6/15/18)


     insulin detemir (Verified  Allergy, Severe, ITCHING, RASH TO PORK INSULIN 

ONLY, 6/15/18)


     insulin glargine (Verified  Allergy, Severe, ITCHING, RASH TO PORK INSULIN 

ONLY, 6/15/18)


     insulin isophane (NPH) (Verified  Allergy, Severe, ITCHING, RASH TO PORK 

INSULIN ONLY, 6/15/18)


     insulin lispro (Verified  Allergy, Severe, ITCHING, RASH TO PORK INSULIN 

ONLY, 6/15/18)


     insulin regular (Verified  Allergy, Severe, ITCHING, RASH TO PORK INSULIN 

ONLY, 6/15/18)


     penicillin G (Verified  Allergy, Severe, HIVES, 6/15/18)


Reported Meds & Prescriptions





Reported Meds & Active Scripts


Active


Bactrim DS (Sulfamethoxazole-Trimethoprim) 800-160 Mg Tab 1 Tab PO BID 14 Days


Zofran Odt (Ondansetron Odt) 4 Mg Tab 4 Mg SL Q6HR PRN


Metformin (Metformin HCl) 500 Mg Tab 500 Mg PO BIDPC


Novolog Inj (Insulin Aspart) 1,000 Unit/10 Ml Vial 1-9 Units SQ ACHS


     Max dose at bedtime:( )units; sugars less than 70,(0)units; sugars


     150-199,(1) unit; sugars 200-249,(3) units; sugars 250-299,(5) units;


     sugars 300-349,(7) units; sugars greater than 349,(9) units


Levemir Inj (Insulin Detemir) 1,000 unit/ 10 ML Vial 12 Units SQ HS 30 Days


     Do not mix with any other Insulin.


Reported


Trazodone (Trazodone HCl) 50 Mg Tab 50 Mg PO HS


Seroquel (Quetiapine Fumarate) 50 Mg Tab 50 Mg PO HS


Keppra (Levetiracetam) 750 Mg Tab 750 Mg PO DAILY








Review of Systems


Except as stated in HPI:  all other systems reviewed are Neg





Physical Exam


Narrative


GENERAL: Well-developed well-nourished male who appears uncomfortable on 

initial examination.


SKIN: Warm and dry.


HEAD: Atraumatic. Normocephalic. 


EYES: Pupils equal and round. No scleral icterus. No injection or drainage. 


ENT: No nasal bleeding or discharge.  Mucous membranes pink and moist.


NECK: Trachea midline. No JVD. 


CARDIOVASCULAR: Regular rate and rhythm.  No murmur appreciated.


RESPIRATORY: No accessory muscle use. Clear to auscultation. Breath sounds 

equal bilaterally. 


GASTROINTESTINAL: Abdomen soft, left lower quadrant tenderness without 

guarding.  Left CVA tenderness is present.   examination reveals descended 

testicles which are nontender.


MUSCULOSKELETAL: No obvious deformities. No clubbing.  No cyanosis.  No edema. 


NEUROLOGICAL: Awake and alert. No obvious cranial nerve deficits.  Motor 

grossly within normal limits. Normal speech.





Data


Data


Last Documented VS





Vital Signs








  Date Time  Temp Pulse Resp B/P (MAP) Pulse Ox O2 Delivery O2 Flow Rate FiO2


 


6/15/18 20:40  106 18 113/74 (87) 98 Room Air  


 


6/15/18 17:00 98.0       








Orders





 Orders


Urinalysis - C+S If Indicated (6/15/18 16:59)


Complete Blood Count With Diff (6/15/18 20:15)


Comprehensive Metabolic Panel (6/15/18 20:15)


Ct Abd/Pel W/O Iv Contrast (6/15/18 20:15)


Ecg Monitoring (6/15/18 20:15)


Iv Access Insert/Monitor (6/15/18 20:15)


Ketorolac Inj (Toradol Inj) (6/15/18 20:15)


Morphine Inj (Morphine Inj) (6/15/18 20:15)


Sodium Chloride 0.9% Flush (Ns Flush) (6/15/18 20:15)


Sodium Chlor 0.9% 1000 Ml Inj (Ns 1000 M (6/15/18 20:15)


Creatine Kinase (Cpk) (6/15/18 20:15)


Beta Hydroxybutyrate (Acetone) (6/15/18 20:15)


Lipase (6/15/18 20:15)


Ondansetron  Odt (Zofran  Odt) (6/15/18 20:15)


Gc And Chlamydia Pcr (6/15/18 21:27)


Azithromycin Powd Pack (Zithromax Powd P (6/15/18 21:30)


Ceftriaxone Inj (Rocephin Inj) (6/15/18 21:30)


Lidocaine 1% Inj (50 Ml) (Xylocaine 1% I (6/15/18 21:30)


Levofloxacin (Levaquin) (6/15/18 22:00)


Ed Discharge Order (6/15/18 21:58)


Sulfamet-Trimeth Ds 800-160 Mg (Bactrim (6/15/18 22:00)


Sodium Chlor 0.9% 1000 Ml Inj (Ns 1000 M (6/15/18 22:00)





Labs





Laboratory Tests








Test


  6/15/18


17:05 6/15/18


20:40


 


Urine Color YELLOW  


 


Urine Turbidity CLEAR  


 


Urine pH 5.0  


 


Urine Specific Gravity 1.027  


 


Urine Protein NEG mg/dL  


 


Urine Glucose (UA) >=500 mg/dL  


 


Urine Ketones


  80 OR GREATER


mg/dL 


 


 


Urine Occult Blood NEG  


 


Urine Nitrite NEG  


 


Urine Bilirubin NEG  


 


Urine Urobilinogen


  LESS THAN 2


mg/dL 


 


 


Urine Leukocyte Esterase NEG  


 


Urine WBC


  LESS THAN 1


/hpf 


 


 


Urine Hyaline Casts 4 /lpf  


 


Urine Granular Casts 9 /lpf  


 


Microscopic Urinalysis Comment


  CULT NOT


INDICATED 


 


 


White Blood Count  12.2 TH/MM3 


 


Red Blood Count  5.76 MIL/MM3 


 


Hemoglobin  16.7 GM/DL 


 


Hematocrit  50.9 % 


 


Mean Corpuscular Volume  88.3 FL 


 


Mean Corpuscular Hemoglobin  29.0 PG 


 


Mean Corpuscular Hemoglobin


Concent 


  32.9 % 


 


 


Red Cell Distribution Width  14.0 % 


 


Platelet Count  384 TH/MM3 


 


Mean Platelet Volume  7.8 FL 


 


Neutrophils (%) (Auto)  70.3 % 


 


Lymphocytes (%) (Auto)  22.3 % 


 


Monocytes (%) (Auto)  6.6 % 


 


Eosinophils (%) (Auto)  0.5 % 


 


Basophils (%) (Auto)  0.3 % 


 


Neutrophils # (Auto)  8.6 TH/MM3 


 


Lymphocytes # (Auto)  2.7 TH/MM3 


 


Monocytes # (Auto)  0.8 TH/MM3 


 


Eosinophils # (Auto)  0.1 TH/MM3 


 


Basophils # (Auto)  0.0 TH/MM3 


 


CBC Comment  DIFF FINAL 


 


Differential Comment   


 


Blood Urea Nitrogen  13 MG/DL 


 


Creatinine  1.19 MG/DL 


 


Random Glucose  306 MG/DL 


 


Total Protein  7.4 GM/DL 


 


Albumin  3.8 GM/DL 


 


Calcium Level  9.1 MG/DL 


 


Alkaline Phosphatase  107 U/L 


 


Aspartate Amino Transf


(AST/SGOT) 


  24 U/L 


 


 


Alanine Aminotransferase


(ALT/SGPT) 


  19 U/L 


 


 


Total Bilirubin  0.5 MG/DL 


 


Sodium Level  134 MEQ/L 


 


Potassium Level  5.0 MEQ/L 


 


Chloride Level  101 MEQ/L 


 


Carbon Dioxide Level  18.1 MEQ/L 


 


Anion Gap  15 MEQ/L 


 


Estimat Glomerular Filtration


Rate 


  67 ML/MIN 


 


 


Total Creatine Kinase  101 U/L 


 


Lipase  71 U/L 


 


B-Hydroxybutyrate  2.74 MMOL/L 











[facility]


Medical Decision Making


Medical Screen Exam Complete:  Yes


Emergency Medical Condition:  Yes


Medical Record Reviewed:  Yes


Differential Diagnosis


Ureteral stone, hydronephrosis, pyelonephritis, diverticulitis, cystitis


Narrative Course


Lab work, urinalysis, CT abdomen and pelvis ordered.  The patient will be given 

IV fluids, Toradol, morphine, oral Zofran.





CT abdomen pelvis reveals mildly distended bladder otherwise unremarkable.  CBC 

reveals WBC count of 12.4, glucose is 306, beta hydroxybutyrate is elevated at 

2.74, anion gap is normal.  Urinalysis reveals 80 ketones.  Patient does have 

mildly distended bladder on CT of the pelvis, rectal examination was performed.

  He had mild prostate tenderness to palpation.  Therefore he will be treated 

for possible early prostatitis with Bactrim, first dose provided here.  

Chlamydia gonorrhea probe are pending at the time of discharge and he was given 

Rocephin and azithromycin.  Upon reexamination he feels improved.  Discussed 

signs and symptoms that would warrant returning to the emergency room.





Diagnosis





 Primary Impression:  


 Abdominal pain


 Additional Impression:  


 Hyperglycemia





***Additional Instructions:  


Medication as prescribed, stay well-hydrated and well-nourished, take Tylenol 

Motrin for pain per dosing instructions on the bottle.  Follow-up with primary 

care physician and return for any acutely new or worsening symptoms.


***Med/Other Pt SpecificInfo:  Prescription(s) given


Scripts


Sulfamethoxazole-Trimethoprim (Bactrim DS) 800-160 Mg Tab


1 TAB PO BID for Infection for 14 Days, #28 TAB 0 Refills


   Prov: Danitza Daniel          6/15/18


Disposition:  01 DISCHARGE HOME


Condition:  Stable











Reynaldo Shi John 15, 2018 20:20 The patient is a 72y Male complaining of  The patient is a 72y Male complaining of chest pain.

## 2022-09-22 NOTE — H&P ADULT - NSHPADDITIONALINFOADULT_GEN_ALL_CORE
Luis Dia MD  Hospitalist, Department of Internal Medicine  Pager [phone] Discussed w/ ACP Anthony Dia MD  Hospitalist, Department of Internal Medicine  Pager [phone]

## 2022-09-22 NOTE — ED PROVIDER NOTE - OBJECTIVE STATEMENT
a 73 y/o male with Pmhx of BPH presenting to the ED with chest tightness. Pt states he experienced a sudden onset of chest tightness in the sternal region last night that woke him up from sleep associated with dizziness. He denies any radiation to arm or jaw, palpitations, shortness of breath, orthopnea, syncope, abdominal pain, nausea, or vomiting

## 2022-09-22 NOTE — ED ADULT NURSE NOTE - CAS TRG GENERAL NORM CIRC DET
"SPIRITUAL HEALTH SERVICES  SPIRITUAL ASSESSMENT Progress Note  [facility] UNIT 23 Ortho    REFERRAL SOURCE: Post surg request    Introductory visit with Akira, who welcomed visit today. Akira shared some of his hospitalization narrative, and noted that while he was overall feeling better he was feeling some concern about pain and the recooperation process.     Akira also shared some of his spiritual journey, focusing on how he found his current [employer] home, [facility] ([facility] Akira named \"the people\" as being a big part of why he feels at home there. He is already on the prayer list and is in touch with his  about this hospitalization.    We closed our visit in prayer for Akira's continued healing and recovery process.    I provided empathetic listening, exploration of support resources, and reassurance through prayer.    PLAN: Spiritual Health Services remains available for patient, family, and staff support.     Please page the on call  by placing a STAT or ASAP Epic referral for Spiritual Health (which will roll to the on call pager).        CASEY Velazquez.   Associate   Pager [phone]      " Strong peripheral pulses

## 2022-09-23 LAB
A1C WITH ESTIMATED AVERAGE GLUCOSE RESULT: 5.3 % — SIGNIFICANT CHANGE UP (ref 4–5.6)
ANION GAP SERPL CALC-SCNC: 11 MMOL/L — SIGNIFICANT CHANGE UP (ref 5–17)
APPEARANCE UR: CLEAR — SIGNIFICANT CHANGE UP
APTT BLD: 56.3 SEC — HIGH (ref 27.5–35.5)
APTT BLD: 67.9 SEC — HIGH (ref 27.5–35.5)
BACTERIA # UR AUTO: NEGATIVE — SIGNIFICANT CHANGE UP
BILIRUB UR-MCNC: NEGATIVE — SIGNIFICANT CHANGE UP
BLD GP AB SCN SERPL QL: NEGATIVE — SIGNIFICANT CHANGE UP
BUN SERPL-MCNC: 16 MG/DL — SIGNIFICANT CHANGE UP (ref 7–23)
CALCIUM SERPL-MCNC: 9.1 MG/DL — SIGNIFICANT CHANGE UP (ref 8.4–10.5)
CHLORIDE SERPL-SCNC: 107 MMOL/L — SIGNIFICANT CHANGE UP (ref 96–108)
CHOLEST SERPL-MCNC: 218 MG/DL — HIGH
CO2 SERPL-SCNC: 23 MMOL/L — SIGNIFICANT CHANGE UP (ref 22–31)
COLOR SPEC: SIGNIFICANT CHANGE UP
CREAT SERPL-MCNC: 1.1 MG/DL — SIGNIFICANT CHANGE UP (ref 0.5–1.3)
DIFF PNL FLD: ABNORMAL
EGFR: 70 ML/MIN/1.73M2 — SIGNIFICANT CHANGE UP
EPI CELLS # UR: 1 /HPF — SIGNIFICANT CHANGE UP
ESTIMATED AVERAGE GLUCOSE: 105 MG/DL — SIGNIFICANT CHANGE UP (ref 68–114)
GLUCOSE BLDC GLUCOMTR-MCNC: 105 MG/DL — HIGH (ref 70–99)
GLUCOSE BLDC GLUCOMTR-MCNC: 150 MG/DL — HIGH (ref 70–99)
GLUCOSE SERPL-MCNC: 115 MG/DL — HIGH (ref 70–99)
GLUCOSE UR QL: ABNORMAL
HCT VFR BLD CALC: 42.2 % — SIGNIFICANT CHANGE UP (ref 39–50)
HCV AB S/CO SERPL IA: 0.07 S/CO — SIGNIFICANT CHANGE UP (ref 0–0.99)
HCV AB SERPL-IMP: SIGNIFICANT CHANGE UP
HDLC SERPL-MCNC: 44 MG/DL — SIGNIFICANT CHANGE UP
HGB BLD-MCNC: 14.6 G/DL — SIGNIFICANT CHANGE UP (ref 13–17)
HYALINE CASTS # UR AUTO: 0 /LPF — SIGNIFICANT CHANGE UP (ref 0–2)
INR BLD: 1.05 RATIO — SIGNIFICANT CHANGE UP (ref 0.88–1.16)
INR BLD: 1.1 RATIO — SIGNIFICANT CHANGE UP (ref 0.88–1.16)
KETONES UR-MCNC: NEGATIVE — SIGNIFICANT CHANGE UP
LEUKOCYTE ESTERASE UR-ACNC: NEGATIVE — SIGNIFICANT CHANGE UP
LIPID PNL WITH DIRECT LDL SERPL: 141 MG/DL — HIGH
MAGNESIUM SERPL-MCNC: 2 MG/DL — SIGNIFICANT CHANGE UP (ref 1.6–2.6)
MCHC RBC-ENTMCNC: 31.9 PG — SIGNIFICANT CHANGE UP (ref 27–34)
MCHC RBC-ENTMCNC: 34.6 GM/DL — SIGNIFICANT CHANGE UP (ref 32–36)
MCV RBC AUTO: 92.3 FL — SIGNIFICANT CHANGE UP (ref 80–100)
NITRITE UR-MCNC: NEGATIVE — SIGNIFICANT CHANGE UP
NON HDL CHOLESTEROL: 174 MG/DL — HIGH
NRBC # BLD: 0 /100 WBCS — SIGNIFICANT CHANGE UP (ref 0–0)
PH UR: 6.5 — SIGNIFICANT CHANGE UP (ref 5–8)
PHOSPHATE SERPL-MCNC: 2.7 MG/DL — SIGNIFICANT CHANGE UP (ref 2.5–4.5)
PLATELET # BLD AUTO: 144 K/UL — LOW (ref 150–400)
POTASSIUM SERPL-MCNC: 3.6 MMOL/L — SIGNIFICANT CHANGE UP (ref 3.5–5.3)
POTASSIUM SERPL-SCNC: 3.6 MMOL/L — SIGNIFICANT CHANGE UP (ref 3.5–5.3)
PROT UR-MCNC: SIGNIFICANT CHANGE UP
PROTHROM AB SERPL-ACNC: 12.1 SEC — SIGNIFICANT CHANGE UP (ref 10.5–13.4)
PROTHROM AB SERPL-ACNC: 12.7 SEC — SIGNIFICANT CHANGE UP (ref 10.5–13.4)
RBC # BLD: 4.57 M/UL — SIGNIFICANT CHANGE UP (ref 4.2–5.8)
RBC # FLD: 11.2 % — SIGNIFICANT CHANGE UP (ref 10.3–14.5)
RBC CASTS # UR COMP ASSIST: 7 /HPF — HIGH (ref 0–4)
RH IG SCN BLD-IMP: POSITIVE — SIGNIFICANT CHANGE UP
SODIUM SERPL-SCNC: 141 MMOL/L — SIGNIFICANT CHANGE UP (ref 135–145)
SP GR SPEC: 1.02 — SIGNIFICANT CHANGE UP (ref 1.01–1.02)
TRIGL SERPL-MCNC: 165 MG/DL — HIGH
TROPONIN T, HIGH SENSITIVITY RESULT: 772 NG/L — HIGH (ref 0–51)
TROPONIN T, HIGH SENSITIVITY RESULT: 954 NG/L — HIGH (ref 0–51)
TSH SERPL-MCNC: 2.13 UIU/ML — SIGNIFICANT CHANGE UP (ref 0.27–4.2)
UROBILINOGEN FLD QL: NEGATIVE — SIGNIFICANT CHANGE UP
WBC # BLD: 6.66 K/UL — SIGNIFICANT CHANGE UP (ref 3.8–10.5)
WBC # FLD AUTO: 6.66 K/UL — SIGNIFICANT CHANGE UP (ref 3.8–10.5)
WBC UR QL: 1 /HPF — SIGNIFICANT CHANGE UP (ref 0–5)

## 2022-09-23 PROCEDURE — 99221 1ST HOSP IP/OBS SF/LOW 40: CPT

## 2022-09-23 PROCEDURE — 93306 TTE W/DOPPLER COMPLETE: CPT | Mod: 26

## 2022-09-23 PROCEDURE — 99152 MOD SED SAME PHYS/QHP 5/>YRS: CPT

## 2022-09-23 PROCEDURE — 93454 CORONARY ARTERY ANGIO S&I: CPT | Mod: 26

## 2022-09-23 PROCEDURE — 99233 SBSQ HOSP IP/OBS HIGH 50: CPT

## 2022-09-23 PROCEDURE — 93010 ELECTROCARDIOGRAM REPORT: CPT

## 2022-09-23 PROCEDURE — 99223 1ST HOSP IP/OBS HIGH 75: CPT | Mod: GC

## 2022-09-23 RX ORDER — TICAGRELOR 90 MG/1
1 TABLET ORAL
Qty: 60 | Refills: 0
Start: 2022-09-23 | End: 2022-10-22

## 2022-09-23 RX ORDER — METOPROLOL TARTRATE 50 MG
12.5 TABLET ORAL
Refills: 0 | Status: DISCONTINUED | OUTPATIENT
Start: 2022-09-23 | End: 2022-09-27

## 2022-09-23 RX ORDER — ATORVASTATIN CALCIUM 80 MG/1
80 TABLET, FILM COATED ORAL AT BEDTIME
Refills: 0 | Status: DISCONTINUED | OUTPATIENT
Start: 2022-09-23 | End: 2022-09-27

## 2022-09-23 RX ORDER — AMLODIPINE BESYLATE 2.5 MG/1
2.5 TABLET ORAL DAILY
Refills: 0 | Status: DISCONTINUED | OUTPATIENT
Start: 2022-09-23 | End: 2022-09-27

## 2022-09-23 RX ORDER — INFLUENZA VIRUS VACCINE 15; 15; 15; 15 UG/.5ML; UG/.5ML; UG/.5ML; UG/.5ML
0.7 SUSPENSION INTRAMUSCULAR ONCE
Refills: 0 | Status: DISCONTINUED | OUTPATIENT
Start: 2022-09-23 | End: 2022-09-23

## 2022-09-23 RX ADMIN — Medication 81 MILLIGRAM(S): at 14:15

## 2022-09-23 RX ADMIN — AMLODIPINE BESYLATE 2.5 MILLIGRAM(S): 2.5 TABLET ORAL at 17:58

## 2022-09-23 RX ADMIN — Medication 12.5 MILLIGRAM(S): at 17:57

## 2022-09-23 RX ADMIN — FINASTERIDE 5 MILLIGRAM(S): 5 TABLET, FILM COATED ORAL at 14:15

## 2022-09-23 RX ADMIN — HEPARIN SODIUM 1000 UNIT(S)/HR: 5000 INJECTION INTRAVENOUS; SUBCUTANEOUS at 21:07

## 2022-09-23 RX ADMIN — SODIUM CHLORIDE 100 MILLILITER(S): 9 INJECTION INTRAMUSCULAR; INTRAVENOUS; SUBCUTANEOUS at 04:07

## 2022-09-23 RX ADMIN — TAMSULOSIN HYDROCHLORIDE 0.4 MILLIGRAM(S): 0.4 CAPSULE ORAL at 21:07

## 2022-09-23 RX ADMIN — ATORVASTATIN CALCIUM 80 MILLIGRAM(S): 80 TABLET, FILM COATED ORAL at 21:07

## 2022-09-23 NOTE — PROGRESS NOTE ADULT - ASSESSMENT
72M Nepali speaker PMH of BPH, HTN, presented to OSH with chest pain x1 day, found to have elevated troponin w/ unremarkable EKG, transferred to [facility] for NSTEMI and now is for catheterization today.

## 2022-09-23 NOTE — PROGRESS NOTE ADULT - PROBLEM SELECTOR PLAN 3
- not currently taking any meds  - monitor, currently controlled  - Likely will start ACEi prior to discharge

## 2022-09-23 NOTE — PROGRESS NOTE ADULT - PROBLEM SELECTOR PLAN 5
Diet: NPO for cath, than DASH/TLC   DVT Prophylaxis: on heparin gtt  Full code    #####  d/w ACP Allan via teams  #####

## 2022-09-23 NOTE — PROGRESS NOTE ADULT - PROBLEM SELECTOR PLAN 1
Troponin elevated to 618, EKG w/ NSR and no ischemic changes. Transferred to [facility] for PCI. Received aspirin/plavix load at OSH.  - c/w aspirin 81 mg PO Qd  - hold plavix for now as per cards  - c/w heparin gtt for full AC  - trend troponins to peak, currently still rising  - NPO for possible cath in AM  - gentle IVF while NPO

## 2022-09-23 NOTE — CONSULT NOTE ADULT - ASSESSMENT
Addendum:    I had the pleasure of meeting Mr. Eubanks, his wife and his son Sam on the phone who translated in Monegasque on the evening of 9/23/22    Given his coronary anatomy, he is best served with CABG surgery which I explained the basic technical aspects of.    His risks would be mortality at 1%, stroke 1%, bleeding, infection, and organ dysfunction at 5%. I think CABG would provide the most durable solution with respect to need for repeat procedures and potential for recurrent coronary syndrome.    Mr. Eubanks understands all this and is eager to proceed. We are tentatively considering Tuesday.    He should continue on heparin infusion and proceed with workup.    Roosevelt Ford MD  Cardiovascular Surgeon

## 2022-09-23 NOTE — PATIENT PROFILE ADULT - FUNCTIONAL ASSESSMENT - DAILY ACTIVITY 6.
Postop Progress Note    Subjective    Elisha Epps presents to the office for postop follow up 2 weeks/p robotic inguinal hernia repair on the left. He is doing well. Minimal pain. He has questions about when he can golf again. Objective    Vitals:    10/15/21 0941   BP: 138/72   Temp: 97.5 °F (36.4 °C)     General: alert, cooperative and no distress  Incision: healing well. No hernia on valsalva. Assessment  Doing well postoperatively. Plan  1. Continue any current medications  2. Wound care discussed  3. Pt is to increase activities as tolerated, may chip and putt; may resume sexual activity. 4. Usual diet  5.  Follow up: in 4  weeks    Electronically signed by Sekou Henson DO on 48/54/1425 at 10:00 AM
4 = No assist / stand by assistance

## 2022-09-23 NOTE — CONSULT NOTE ADULT - SUBJECTIVE AND OBJECTIVE BOX
History of Present Illness:   72y M PMHx BPH p/w chest pain to [facility] w/  NSTEMI. Patient has frequent  chest pain in the substernal area that radiates to both shoulders. States pain 8/10 on night of admission>pain awakened him from sleep>neg  shortness of breath, diaphoresis, nausea vomiting.   He is usually able to walk for several blocks without chest pain. His son mentions that he has had a similar chest pain episodes a few months ago when he was walking. At the outside hospital,  [place] [address],patient received full ASA, plavix, one dose nitro at  and his pain got better from 10/10 to 2/10.   Started on heparin, transferred to [facility] for diagnostic cath where he was found to have 3 VCAD > referred for CABG evaluation      Past Medical History  History of BPH    History of BPH        Past Surgical History  No significant past surgical history        MEDICATIONS  (STANDING):  aspirin enteric coated 81 milliGRAM(s) Oral daily  atorvastatin 80 milliGRAM(s) Oral at bedtime  finasteride 5 milliGRAM(s) Oral daily  heparin  Infusion.  Unit(s)/Hr (10 mL/Hr) IV Continuous <Continuous>  influenza  Vaccine (HIGH DOSE) 0.7 milliLiter(s) IntraMuscular once  sodium chloride 0.9%. 1000 milliLiter(s) (100 mL/Hr) IV Continuous <Continuous>  tamsulosin 0.4 milliGRAM(s) Oral at bedtime    MEDICATIONS  (PRN):  heparin   Injectable 5000 Unit(s) IV Push every 6 hours PRN For aPTT less than 40  melatonin 3 milliGRAM(s) Oral at bedtime PRN Insomnia    Antiplatelet therapy:   Plavix 75 x 1  at [facility]                          Last dose/amt:    Allergies: No Known Allergies      SOCIAL HISTORY:  Smoker: [ ] Yes  [ x] No        PACK YEARS:                         WHEN QUIT?  ETOH use: [ ] Yes  [x ] No              FREQUENCY / QUANTITY:  Ilicit Drug use:  [ ] Yes  [ x] No  Occupation:   Live with: wife      Relevant Family History  FAMILY HISTORY:  No pertinent family history in first degree relatives        Review of Systems  GENERAL:  Fevers[] chills[] sweats[] fatigue[] weight loss[] weight gain []                                        NEURO:  parathesias[] seizures []  syncope []  confusion []                                                                                  EYES: glasses[]  blurry vision[]  discharge[] pain[] glaucoma []                                                                            ENMT:  difficulty hearing []  vertigo[]  dysphagia[] epistaxis[] recent dental work []                                      CV:  chest pain[x] palpitations[] HADLEY [X] diaphoresis [] edema[]                                                                                             RESPIRATORY:  wheezing[] SOB[] cough [] sputum[] hemoptysis[]                                                                    GI:  nausea[]  vomiting []  diarrhea[] constipation [] melena []                                                                        : hematuria[ ]  dysuria[ ] urgency[] incontinence[]                                                                                              MUSCULOSKELETAL:  arthritis[ ]  joint swelling [ ] muscle weakness [ ]                                                                  SKIN/BREAST:  rash[ ] itching [ ]  hair loss[ ] masses[ ]                                                                                                PSYCH:  dementia [ ] depression [ ] anxiety[ ]                                                                                                                  HEME/LYMPH:  bruises easily[ ] enlarged lymph nodes[ ] tender lymph nodes[ ]                                                 ENDOCRINE:  cold intolerance[ ] heat intolerance[ ] polydipsia[ ]                                                                              PHYSICAL EXAM  Vital Signs Last 24 Hrs  T(C): 36.8 (23 Sep 2022 15:45), Max: 37.1 (23 Sep 2022 12:45)  T(F): 98.2 (23 Sep 2022 15:45), Max: 98.7 (23 Sep 2022 12:45)  HR: 69 (23 Sep 2022 15:45) (54 - 82)  BP: 144/77 (23 Sep 2022 15:45) (109/65 - 157/78)  BP(mean): 71 (23 Sep 2022 15:15) (71 - 127)  RR: 18 (23 Sep 2022 15:45) (16 - 22)  SpO2: 97% (23 Sep 2022 15:45) (94% - 99%)    Parameters below as of 23 Sep 2022 15:45  Patient On (Oxygen Delivery Method): room air        General: Well nourished, well developed, no acute distress.                                                         Neuro: Normal exam oriented to person/place & time with no focal motor or sensory  deficits.                    Eyes: Normal exam of conjunctiva & lids, pupils equally reactive.   ENT: Normal exam of nasal/oral mucosa with absence of cyanosis.   Neck: Normal exam of jugular veins, trachea & thyroid.   Chest: Normal lung exam with good air movement absence of wheezes, rales, or rhonchi:                                                                          CV:  Auscultation: normal [x ] S3[ ] S4[ ] Irregular [ ] Rub[ ] Clicks[ ]  Murmurs none:[x ]systolic [ ]  diastolic [ ] holosystolic [ ]  Carotids: No Bruits[x ] Other____________ Abdominal Aorta: normal [x ] nonpalpable[ ]                                                                         GI: Normal exam of abdomen, liver & spleen with no noted masses or tenderness.                                                                                              Extremities: Normal no evidence of cyanosis or deformity Edema: none[x ]trace[ ]1+[ ]2+[ ]3+[ ]4+[ ]  Lower Extremity Pulses: Right[ ] Left[ ]Varicosities[ ]  SKIN : Normal exam to inspection & palpation.                                                           LABS:                        14.6   6.66  )-----------( 144      ( 23 Sep 2022 06:37 )             42.2     09-23    141  |  107  |  16  ----------------------------<  115<H>  3.6   |  23  |  1.10    Ca    9.1      23 Sep 2022 06:37  Phos  2.7     09-23  Mg     2.0     09-23    TPro  6.4  /  Alb  4.2  /  TBili  0.8  /  DBili  x   /  AST  53<H>  /  ALT  17  /  AlkPhos  50  09-22    PT/INR - ( 23 Sep 2022 06:37 )   PT: 12.1 sec;   INR: 1.05 ratio         PTT - ( 23 Sep 2022 06:37 )  PTT:56.3 sec    CARDIAC MARKERS ( 22 Sep 2022 16:20 )  x     / x     / 475 U/L / x     / x              Cardiac Cath: reported triple vessel CAD        Assessment:  72y Male presents to  with NSTEMI  diagnostic cath revealed 3 VCAD Referred for CABG evaluation        Plan:    Preop workup initiated  Hep gtt  ASA statin betablocker  Carotid's/ Echo  OR date to be determined         
Patient seen and evaluated at bedside    Chief Complaint: Chest pain    HPI:    Patient is a 72y M PMHx BPH p/w chest pain, transferred from  for concern of NSTEMI. Patient has been hving constatn chest pain in the substernal area that radiates to both shoulders. The pain has been constatn since midnight on 9/22. He denies any shortness of breath, diaphoresis, nausea vomiting. He is usually able to walk for several blocks without chest pain. His son mentions that he has had a similar chest pain episodes a few months ago when he was walking. At the outside hospital,  [place] [id number],[place]usan received full ASA, plavix, one dose nitro at  and his pain got better from 10/10 to 2/10. Started on heparin, currently on heparin in room. Patient denies SOB, fevers, pleuritic chest pain, numbness, back pain, abdominal pain, vomiting, recent illness.      PMHx:   History of BPH    PSHx:       Allergies:  No Known Allergies      Home Meds:    Current Medications:   heparin   Injectable 5000 Unit(s) IV Push every 6 hours PRN  nitroglycerin     SubLingual 0.4 milliGRAM(s) SubLingual Once      FAMILY HISTORY:  Father with MI    Social History:  never smoker    REVIEW OF SYSTEMS:  Constitutional:     [ ] negative [ ] fevers [ ] chills [ ] weight loss [ ] weight gain  HEENT:                  [ ] negative [ ] dry eyes [ ] eye irritation [ ] postnasal drip [ ] nasal congestion  CV:                         [ ] negative  [ ] chest pain [ ] orthopnea [ ] palpitations [ ] murmur  Resp:                     [ ] negative [ ] cough [ ] shortness of breath [ ] dyspnea [ ] wheezing [ ] sputum [ ]hemoptysis  GI:                          [ ] negative [ ] nausea [ ] vomiting [ ] diarrhea [ ] constipation [ ] abd pain [ ] dysphagia   :                        [ ] negative [ ] dysuria [ ] nocturia [ ] hematuria [ ] increased urinary frequency  Musculoskeletal: [ ] negative [ ] back pain [ ] myalgias [ ] arthralgias [ ] fracture  Skin:                       [ ] negative [ ] rash [ ] itch  Neurological:        [ ] negative [ ] headache [ ] dizziness [ ] syncope [ ] weakness [ ] numbness  Psychiatric:           [ ] negative [ ] anxiety [ ] depression  Endocrine:            [ ] negative [ ] diabetes [ ] thyroid problem  Heme/Lymph:      [ ] negative [ ] anemia [ ] bleeding problem  Allergic/Immune: [ ] negative [ ] itchy eyes [ ] nasal discharge [ ] hives [ ] angioedema    [ ] All other systems negative  [ ] Unable to assess ROS due to      Physical Exam:  T(F): 98.2 (09-22), Max: 98.3 (09-22)  HR: 60 (09-22) (56 - 60)  BP: 133/84 (09-22) (133/84 - 134/80)  RR: 17 (09-22)  SpO2: 96% (09-22)  GENERAL: No acute distress, well-developed  HEAD:  Atraumatic, Normocephalic  ENT: EOMI, PERRLA, conjunctiva and sclera clear, Neck supple, No JVD, moist mucosa  CHEST/LUNG: Clear to auscultation bilaterally; No wheeze, equal breath sounds bilaterally   BACK: No spinal tenderness  HEART: Regular rate and rhythm; No murmurs, rubs, or gallops  ABDOMEN: Soft, Nontender, Nondistended; Bowel sounds present  EXTREMITIES:  No clubbing, cyanosis, or edema  PSYCH: Nl behavior, nl affect  NEUROLOGY: AAOx3, non-focal, cranial nerves intact  SKIN: Normal color, No rashes or lesions  LINES:    Cardiovascular Diagnostic Testing:    ECG: Personally reviewed: Sinus imelda to 54 with no ST changes or T wave inversiosn      Imaging:    CXR: None in system    Labs: Personally reviewed                        14.0   7.07  )-----------( 169      ( 22 Sep 2022 19:59 )             41.7     09-22    141  |  106  |  14  ----------------------------<  165<H>  3.7   |  25  |  1.01    Ca    8.9      22 Sep 2022 19:59    TPro  6.4  /  Alb  4.2  /  TBili  0.8  /  DBili  x   /  AST  53<H>  /  ALT  17  /  AlkPhos  50  09-22    PT/INR - ( 22 Sep 2022 19:59 )   PT: 13.9 sec;   INR: 1.21 ratio         PTT - ( 22 Sep 2022 19:59 )  PTT:>200.0 sec      CARDIAC MARKERS ( 22 Sep 2022 19:59 )  618 ng/L / x     / x     / x     / x     / x

## 2022-09-23 NOTE — PROGRESS NOTE ADULT - SUBJECTIVE AND OBJECTIVE BOX
[facility] Division of Hospital Medicine  Declan Garcia MD  Available via MS Teams  Pager [phone]    SUBJECTIVE / OVERNIGHT EVENTS: No acute events reported overnight. Patient denies shortness of breat. Reports at this time he has no chest pain.     ADDITIONAL REVIEW OF SYSTEMS:    MEDICATIONS  (STANDING):  aspirin enteric coated 81 milliGRAM(s) Oral daily  atorvastatin 80 milliGRAM(s) Oral at bedtime  dextrose 5%. 1000 milliLiter(s) (50 mL/Hr) IV Continuous <Continuous>  dextrose 5%. 1000 milliLiter(s) (100 mL/Hr) IV Continuous <Continuous>  dextrose 50% Injectable 25 Gram(s) IV Push once  dextrose 50% Injectable 12.5 Gram(s) IV Push once  dextrose 50% Injectable 25 Gram(s) IV Push once  finasteride 5 milliGRAM(s) Oral daily  glucagon  Injectable 1 milliGRAM(s) IntraMuscular once  heparin  Infusion.  Unit(s)/Hr (10 mL/Hr) IV Continuous <Continuous>  influenza  Vaccine (HIGH DOSE) 0.7 milliLiter(s) IntraMuscular once  insulin lispro (ADMELOG) corrective regimen sliding scale   SubCutaneous every 6 hours  sodium chloride 0.9%. 1000 milliLiter(s) (100 mL/Hr) IV Continuous <Continuous>  tamsulosin 0.4 milliGRAM(s) Oral at bedtime    MEDICATIONS  (PRN):  dextrose Oral Gel 15 Gram(s) Oral once PRN Blood Glucose LESS THAN 70 milliGRAM(s)/deciliter  heparin   Injectable 5000 Unit(s) IV Push every 6 hours PRN For aPTT less than 40  melatonin 3 milliGRAM(s) Oral at bedtime PRN Insomnia      I&O's Summary      PHYSICAL EXAM:  Vital Signs Last 24 Hrs  T(C): 36.7 (23 Sep 2022 10:56), Max: 36.9 (23 Sep 2022 06:40)  T(F): 98.1 (23 Sep 2022 10:56), Max: 98.4 (23 Sep 2022 06:40)  HR: 54 (23 Sep 2022 10:56) (54 - 70)  BP: 155/84 (23 Sep 2022 10:56) (109/65 - 164/91)  BP(mean): 78 (23 Sep 2022 04:08) (78 - 78)  RR: 16 (23 Sep 2022 10:56) (16 - 19)  SpO2: 99% (23 Sep 2022 10:56) (94% - 99%)    Parameters below as of 23 Sep 2022 06:40  Patient On (Oxygen Delivery Method): room air      CONSTITUTIONAL: NAD, well-groomed  EYES: PERRLA; conjunctiva and sclera clear  ENMT: Moist oral mucosa, no pharyngeal injection or exudates; normal dentition  NECK: Supple, no palpable masses; no thyromegaly  RESPIRATORY: Normal respiratory effort; lungs are clear to auscultation bilaterally  CARDIOVASCULAR: normal S1 and S2, no murmur/rub/gallop; No lower extremity edema  ABDOMEN: Nontender to palpation, normoactive bowel sounds, no rebound/guarding; No hepatosplenomegaly  MUSCULOSKELETAL:  no clubbing or cyanosis of digits; no joint swelling or tenderness to palpation  PSYCH: A+O to person, place, and time; affect appropriate  NEUROLOGY: CN 2-12 are intact and symmetric; no gross sensory deficits   SKIN: No rashes; no palpable lesions    LABS:                        14.6   6.66  )-----------( 144      ( 23 Sep 2022 06:37 )             42.2     09-23    141  |  107  |  16  ----------------------------<  115<H>  3.6   |  23  |  1.10    Ca    9.1      23 Sep 2022 06:37  Phos  2.7     09-23  Mg     2.0     09-23    TPro  6.4  /  Alb  4.2  /  TBili  0.8  /  DBili  x   /  AST  53<H>  /  ALT  17  /  AlkPhos  50  09-22    PT/INR - ( 23 Sep 2022 06:37 )   PT: 12.1 sec;   INR: 1.05 ratio         PTT - ( 23 Sep 2022 06:37 )  PTT:56.3 sec  CARDIAC MARKERS ( 22 Sep 2022 16:20 )  x     / x     / 475 U/L / x     / x        COVID-19 PCR: NotDetec (22 Sep 2022 18:00)    COMMUNICATION:  Care Discussed with Consultants/Other Providers and Details of Discussion: Discussed with ACP  Discussions with Patient/Family: Discussed with patient, plan is for cath today

## 2022-09-23 NOTE — ED ADULT NURSE REASSESSMENT NOTE - NS ED NURSE REASSESS COMMENT FT1
0952= report given to Isabel BARRERA/CSSU. PAtient alert & oriented x4. No complaints of chestpain, sob or lightheadedness. Heparin drip infusing at 1000units, via IV pump. Ordered off tele.

## 2022-09-23 NOTE — ED ADULT NURSE REASSESSMENT NOTE - NS ED NURSE REASSESS COMMENT FT1
0800-PTT-56.3. No change in rate as per acs nomogram titration. Heparin maintained at 1000units, on the IV pump.

## 2022-09-23 NOTE — PROGRESS NOTE ADULT - PROBLEM SELECTOR PLAN 4
- stress hyperglycemia vs diabetes  - correction scale insulin q6h for now while NPO  - f/u HgbA1c (specimen received, not yet performed)

## 2022-09-24 DIAGNOSIS — I25.10 ATHEROSCLEROTIC HEART DISEASE OF NATIVE CORONARY ARTERY WITHOUT ANGINA PECTORIS: ICD-10-CM

## 2022-09-24 LAB
ALBUMIN SERPL ELPH-MCNC: 3.8 G/DL — SIGNIFICANT CHANGE UP (ref 3.3–5)
ALP SERPL-CCNC: 54 U/L — SIGNIFICANT CHANGE UP (ref 40–120)
ALT FLD-CCNC: 20 U/L — SIGNIFICANT CHANGE UP (ref 10–45)
ANION GAP SERPL CALC-SCNC: 12 MMOL/L — SIGNIFICANT CHANGE UP (ref 5–17)
APTT BLD: 35.8 SEC — HIGH (ref 27.5–35.5)
APTT BLD: 37 SEC — HIGH (ref 27.5–35.5)
AST SERPL-CCNC: 40 U/L — SIGNIFICANT CHANGE UP (ref 10–40)
BILIRUB SERPL-MCNC: 1.1 MG/DL — SIGNIFICANT CHANGE UP (ref 0.2–1.2)
BUN SERPL-MCNC: 13 MG/DL — SIGNIFICANT CHANGE UP (ref 7–23)
CALCIUM SERPL-MCNC: 9.1 MG/DL — SIGNIFICANT CHANGE UP (ref 8.4–10.5)
CHLORIDE SERPL-SCNC: 109 MMOL/L — HIGH (ref 96–108)
CO2 SERPL-SCNC: 22 MMOL/L — SIGNIFICANT CHANGE UP (ref 22–31)
CREAT SERPL-MCNC: 0.99 MG/DL — SIGNIFICANT CHANGE UP (ref 0.5–1.3)
EGFR: 80 ML/MIN/1.73M2 — SIGNIFICANT CHANGE UP
FIBRINOGEN PPP-MCNC: 561 MG/DL — HIGH (ref 330–520)
GLUCOSE SERPL-MCNC: 121 MG/DL — HIGH (ref 70–99)
HCT VFR BLD CALC: 44.4 % — SIGNIFICANT CHANGE UP (ref 39–50)
HGB BLD-MCNC: 15.1 G/DL — SIGNIFICANT CHANGE UP (ref 13–17)
MCHC RBC-ENTMCNC: 31.6 PG — SIGNIFICANT CHANGE UP (ref 27–34)
MCHC RBC-ENTMCNC: 34 GM/DL — SIGNIFICANT CHANGE UP (ref 32–36)
MCV RBC AUTO: 92.9 FL — SIGNIFICANT CHANGE UP (ref 80–100)
MRSA PCR RESULT.: SIGNIFICANT CHANGE UP
NRBC # BLD: 0 /100 WBCS — SIGNIFICANT CHANGE UP (ref 0–0)
PA ADP PRP-ACNC: 155 PRU — LOW (ref 194–417)
PLATELET # BLD AUTO: 158 K/UL — SIGNIFICANT CHANGE UP (ref 150–400)
POTASSIUM SERPL-MCNC: 4 MMOL/L — SIGNIFICANT CHANGE UP (ref 3.5–5.3)
POTASSIUM SERPL-SCNC: 4 MMOL/L — SIGNIFICANT CHANGE UP (ref 3.5–5.3)
PROT SERPL-MCNC: 6.3 G/DL — SIGNIFICANT CHANGE UP (ref 6–8.3)
RBC # BLD: 4.78 M/UL — SIGNIFICANT CHANGE UP (ref 4.2–5.8)
RBC # FLD: 11.2 % — SIGNIFICANT CHANGE UP (ref 10.3–14.5)
S AUREUS DNA NOSE QL NAA+PROBE: SIGNIFICANT CHANGE UP
SODIUM SERPL-SCNC: 143 MMOL/L — SIGNIFICANT CHANGE UP (ref 135–145)
WBC # BLD: 6.48 K/UL — SIGNIFICANT CHANGE UP (ref 3.8–10.5)
WBC # FLD AUTO: 6.48 K/UL — SIGNIFICANT CHANGE UP (ref 3.8–10.5)

## 2022-09-24 PROCEDURE — 99221 1ST HOSP IP/OBS SF/LOW 40: CPT

## 2022-09-24 PROCEDURE — 99232 SBSQ HOSP IP/OBS MODERATE 35: CPT

## 2022-09-24 PROCEDURE — 93010 ELECTROCARDIOGRAM REPORT: CPT

## 2022-09-24 PROCEDURE — 99233 SBSQ HOSP IP/OBS HIGH 50: CPT

## 2022-09-24 RX ORDER — HEPARIN SODIUM 5000 [USP'U]/ML
1100 INJECTION INTRAVENOUS; SUBCUTANEOUS
Qty: 25000 | Refills: 0 | Status: DISCONTINUED | OUTPATIENT
Start: 2022-09-24 | End: 2022-09-27

## 2022-09-24 RX ADMIN — Medication 12.5 MILLIGRAM(S): at 17:22

## 2022-09-24 RX ADMIN — AMLODIPINE BESYLATE 2.5 MILLIGRAM(S): 2.5 TABLET ORAL at 06:04

## 2022-09-24 RX ADMIN — FINASTERIDE 5 MILLIGRAM(S): 5 TABLET, FILM COATED ORAL at 13:07

## 2022-09-24 RX ADMIN — TAMSULOSIN HYDROCHLORIDE 0.4 MILLIGRAM(S): 0.4 CAPSULE ORAL at 22:31

## 2022-09-24 RX ADMIN — ATORVASTATIN CALCIUM 80 MILLIGRAM(S): 80 TABLET, FILM COATED ORAL at 22:31

## 2022-09-24 RX ADMIN — Medication 12.5 MILLIGRAM(S): at 06:04

## 2022-09-24 RX ADMIN — HEPARIN SODIUM 11 UNIT(S)/HR: 5000 INJECTION INTRAVENOUS; SUBCUTANEOUS at 10:46

## 2022-09-24 RX ADMIN — HEPARIN SODIUM 13 UNIT(S)/HR: 5000 INJECTION INTRAVENOUS; SUBCUTANEOUS at 17:40

## 2022-09-24 RX ADMIN — Medication 81 MILLIGRAM(S): at 13:07

## 2022-09-24 NOTE — PROGRESS NOTE ADULT - SUBJECTIVE AND OBJECTIVE BOX
Cardiology Consult Progress Note    Patient seen and examined at bedside.    Overnight Events:   No acute events    REVIEW OF SYSTEMS:  Constitutional:     [x ] negative [ ] fevers [ ] chills [ ] weight loss [ ] weight gain  HEENT:                  [x ] negative [ ] dry eyes [ ] eye irritation [ ] postnasal drip [ ] nasal congestion  CV:                         [ x] negative  [ ] chest pain [ ] orthopnea [ ] palpitations [ ] murmur  Resp:                     [x ] negative [ ] cough [ ] shortness of breath [ ] dyspnea [ ] wheezing [ ] sputum [ ]hemoptysis  GI:                          [ x] negative [ ] nausea [ ] vomiting [ ] diarrhea [ ] constipation [ ] abd pain [ ] dysphagia   :                        [ x] negative [ ] dysuria [ ] nocturia [ ] hematuria [ ] increased urinary frequency  Musculoskeletal: [x ] negative [ ] back pain [ ] myalgias [ ] arthralgias [ ] fracture  Skin:                       [ x] negative [ ] rash [ ] itch  Neurological:        [ x] negative [ ] headache [ ] dizziness [ ] syncope [ ] weakness [ ] numbness  Psychiatric:           [ x] negative [ ] anxiety [ ] depression  Endocrine:            [ x] negative [ ] diabetes [ ] thyroid problem  Heme/Lymph:      [ x] negative [ ] anemia [ ] bleeding problem  Allergic/Immune: [ x] negative [ ] itchy eyes [ ] nasal discharge [ ] hives [ ] angioedema    Current Meds:  amLODIPine   Tablet 2.5 milliGRAM(s) Oral daily  aspirin enteric coated 81 milliGRAM(s) Oral daily  atorvastatin 80 milliGRAM(s) Oral at bedtime  finasteride 5 milliGRAM(s) Oral daily  heparin  Infusion 1100 Unit(s)/Hr IV Continuous <Continuous>  melatonin 3 milliGRAM(s) Oral at bedtime PRN  metoprolol tartrate 12.5 milliGRAM(s) Oral two times a day  sodium chloride 0.9%. 1000 milliLiter(s) IV Continuous <Continuous>  tamsulosin 0.4 milliGRAM(s) Oral at bedtime      Vitals:  T(F): 98.1 (09-24), Max: 98.7 (09-23)  HR: 67 (09-24) (54 - 82)  BP: 114/68 (09-24) (114/68 - 157/78)  RR: 18 (09-24)  SpO2: 94% (09-24)  I&O's Summary    23 Sep 2022 07:01  -  24 Sep 2022 07:00  --------------------------------------------------------  IN: 550 mL / OUT: 1200 mL / NET: -650 mL    24 Sep 2022 07:01  -  24 Sep 2022 09:59  --------------------------------------------------------  IN: 360 mL / OUT: 0 mL / NET: 360 mL      Physical Exam:  Appearance: No acute distress; well appearing  Eyes: PERRL, EOMI, pink conjunctiva  HEENT: Normal oral mucosa  Cardiovascular: RRR, S1, S2, no murmurs, rubs, or gallops; no edema; no JVD; RRA site without complication  Respiratory: Clear to auscultation bilaterally  Gastrointestinal: soft, non-tender, non-distended with normal bowel sounds  Musculoskeletal: No clubbing; no joint deformity   Neurologic: Non-focal  Lymphatic: No lymphadenopathy  Psychiatry: AAOx3, mood & affect appropriate  Skin: No rashes, ecchymoses, or cyanosis                          15.1   6.48  )-----------( 158      ( 24 Sep 2022 06:44 )             44.4     09-24    143  |  109<H>  |  13  ----------------------------<  121<H>  4.0   |  22  |  0.99    Ca    9.1      24 Sep 2022 06:46  Phos  2.7     09-23  Mg     2.0     09-23    TPro  6.3  /  Alb  3.8  /  TBili  1.1  /  DBili  x   /  AST  40  /  ALT  20  /  AlkPhos  54  09-24    PT/INR - ( 23 Sep 2022 06:37 )   PT: 12.1 sec;   INR: 1.05 ratio         PTT - ( 24 Sep 2022 06:44 )  PTT:37.0 sec  CARDIAC MARKERS ( 23 Sep 2022 06:37 )  954 ng/L / x     / x     / x     / x     / x      CARDIAC MARKERS ( 23 Sep 2022 01:12 )  772 ng/L / x     / x     / x     / x     / x      CARDIAC MARKERS ( 22 Sep 2022 19:59 )  618 ng/L / x     / x     / x     / x     / x      CARDIAC MARKERS ( 22 Sep 2022 16:20 )  x     / x     / x     / 475 U/L / x     / x        Serum Pro-Brain Natriuretic Peptide: 605 pg/mL (09-22 @ 16:20)

## 2022-09-24 NOTE — PROGRESS NOTE ADULT - PROBLEM SELECTOR PLAN 3
continue asa/ bb/ statin and hep gttp  preop work up in progress  ck carotids/ pft's/ type and screen   OR planned for tue 9/27 with Dr. Ford

## 2022-09-24 NOTE — PROGRESS NOTE ADULT - ASSESSMENT
73yo man with BPH, admitted with NSTEMI, s/p C with multivessel CAD, undergoing CABG evaluation.     #NSTEMI  #Multivessel CAD  - Continue aspirin 81 mg daily  - Continue atorvastatin 80 mg qHS  - Continue metoprolol tartrate 12.5 mg BID  - Continue heparin gtt per ACS protocol  - Continue IV heparin gtt  - Appreciate CTS recommendations    #HTN  - Continue amlodipine 2.5 mg daily     Saroj Matias MD  Cardiology Fellow, PGY4    This is an overnight/weekend consult. During regular weekday hospital hours please contact appropriate day consult fellow. Contact information can be obtained on [employer] (Login: [email or web address]).  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   9/24 VSS: continue asa/ bb/ statin and hep gttp  preop work up in progress  ck carotids/ pft's/ type and screen   OR planned for tue 9/27 with Dr. Ford

## 2022-09-24 NOTE — PROGRESS NOTE ADULT - ASSESSMENT
71yo man with BPH, admitted with NSTEMI, s/p C with multivessel CAD, undergoing CABG evaluation.     #NSTEMI  #Multivessel CAD  - Continue aspirin 81 mg daily  - Continue atorvastatin 80 mg qHS  - Continue metoprolol tartrate 12.5 mg BID  - Continue heparin gtt per ACS protocol  - Continue IV heparin gtt  - Appreciate CTS recommendations    #HTN  - Continue amlodipine 2.5 mg daily     Saroj Matias MD  Cardiology Fellow, PGY4    This is an overnight/weekend consult. During regular weekday hospital hours please contact appropriate day consult fellow. Contact information can be obtained on [employer] (Login: [email or web address]).

## 2022-09-24 NOTE — PROGRESS NOTE ADULT - SUBJECTIVE AND OBJECTIVE BOX
VITAL SIGNS    Telemetry:    Vital Signs Last 24 Hrs  T(C): 36.7 (09-24-22 @ 04:03), Max: 37.1 (09-23-22 @ 12:45)  T(F): 98.1 (09-24-22 @ 04:03), Max: 98.7 (09-23-22 @ 12:45)  HR: 67 (09-24-22 @ 04:03) (66 - 82)  BP: 114/68 (09-24-22 @ 04:03) (114/68 - 157/78)  RR: 18 (09-24-22 @ 04:03) (16 - 22)  SpO2: 94% (09-24-22 @ 04:03) (94% - 97%)            09-23 @ 07:01  -  09-24 @ 07:00  --------------------------------------------------------  IN: 550 mL / OUT: 1200 mL / NET: -650 mL    09-24 @ 07:01  -  09-24 @ 11:45  --------------------------------------------------------  IN: 360 mL / OUT: 0 mL / NET: 360 mL       Daily     Daily   Admit Wt: Drug Dosing Weight  Height (cm): 180.3 (23 Sep 2022 10:56)  Weight (kg): 83.5 (23 Sep 2022 10:56)  BMI (kg/m2): 25.7 (23 Sep 2022 10:56)  BSA (m2): 2.04 (23 Sep 2022 10:56)    Bilirubin Total, Serum: 1.1 mg/dL (09-24 @ 06:46)    CAPILLARY BLOOD GLUCOSE              amLODIPine   Tablet 2.5 milliGRAM(s) Oral daily  aspirin enteric coated 81 milliGRAM(s) Oral daily  atorvastatin 80 milliGRAM(s) Oral at bedtime  finasteride 5 milliGRAM(s) Oral daily  heparin  Infusion 1100 Unit(s)/Hr IV Continuous <Continuous>  melatonin 3 milliGRAM(s) Oral at bedtime PRN  metoprolol tartrate 12.5 milliGRAM(s) Oral two times a day  sodium chloride 0.9%. 1000 milliLiter(s) IV Continuous <Continuous>  tamsulosin 0.4 milliGRAM(s) Oral at bedtime      PHYSICAL EXAM    Subjective: "Hi.   Neurology: alert and oriented x 3, nonfocal, no gross deficits  CV : tele:  RSR  Sternal Wound :  CDI with dressing , Stable  Lungs: clear. RR easy, unlabored   Abdomen: soft, nontender, nondistended, positive bowel sounds, bowel movement   Neg N/V/D   :  pt voiding without difficulty   Extremities:   MOISE; edema, neg calf tenderness.   PPP bilaterally      PW:  Chest tubes:                 VITAL SIGNS    Telemetry:  rsr 60-70  Vital Signs Last 24 Hrs  T(C): 36.7 (09-24-22 @ 04:03), Max: 37.1 (09-23-22 @ 12:45)  T(F): 98.1 (09-24-22 @ 04:03), Max: 98.7 (09-23-22 @ 12:45)  HR: 67 (09-24-22 @ 04:03) (66 - 82)  BP: 114/68 (09-24-22 @ 04:03) (114/68 - 157/78)  RR: 18 (09-24-22 @ 04:03) (16 - 22)  SpO2: 94% (09-24-22 @ 04:03) (94% - 97%)            09-23 @ 07:01  -  09-24 @ 07:00  --------------------------------------------------------  IN: 550 mL / OUT: 1200 mL / NET: -650 mL    09-24 @ 07:01  -  09-24 @ 11:45  --------------------------------------------------------  IN: 360 mL / OUT: 0 mL / NET: 360 mL       Daily   Admit Wt: Drug Dosing Weight  Height (cm): 180.3 (23 Sep 2022 10:56)  Weight (kg): 83.5 (23 Sep 2022 10:56)  BMI (kg/m2): 25.7 (23 Sep 2022 10:56)  BSA (m2): 2.04 (23 Sep 2022 10:56)    Bilirubin Total, Serum: 1.1 mg/dL (09-24 @ 06:46)        amLODIPine   Tablet 2.5 milliGRAM(s) Oral daily  aspirin enteric coated 81 milliGRAM(s) Oral daily  atorvastatin 80 milliGRAM(s) Oral at bedtime  finasteride 5 milliGRAM(s) Oral daily  heparin  Infusion 1100 Unit(s)/Hr IV Continuous <Continuous>  melatonin 3 milliGRAM(s) Oral at bedtime PRN  metoprolol tartrate 12.5 milliGRAM(s) Oral two times a day  sodium chloride 0.9%. 1000 milliLiter(s) IV Continuous <Continuous>  tamsulosin 0.4 milliGRAM(s) Oral at bedtime        PHYSICAL EXAM    Subjective: "No pain."  Neurology: alert and oriented x 3, nonfocal, no gross deficits  CV : tele:  RSR 60-70  Lungs: clear. RR easy, unlabored   Abdomen: soft, nontender, nondistended, positive bowel sounds, + bowel movement   Neg N/V/D   :  pt voiding without difficulty   Extremities:   MOISE; neg LE edema, neg calf tenderness.   PPP bilaterally; right radial cath site cdi brayden- neg hematoma/ bleeding

## 2022-09-25 LAB
ALBUMIN SERPL ELPH-MCNC: 3.7 G/DL — SIGNIFICANT CHANGE UP (ref 3.3–5)
ALP SERPL-CCNC: 51 U/L — SIGNIFICANT CHANGE UP (ref 40–120)
ALT FLD-CCNC: 23 U/L — SIGNIFICANT CHANGE UP (ref 10–45)
ANION GAP SERPL CALC-SCNC: 12 MMOL/L — SIGNIFICANT CHANGE UP (ref 5–17)
APTT BLD: 46.3 SEC — HIGH (ref 27.5–35.5)
APTT BLD: 59.5 SEC — HIGH (ref 27.5–35.5)
APTT BLD: 66.4 SEC — HIGH (ref 27.5–35.5)
AST SERPL-CCNC: 28 U/L — SIGNIFICANT CHANGE UP (ref 10–40)
BILIRUB SERPL-MCNC: 0.9 MG/DL — SIGNIFICANT CHANGE UP (ref 0.2–1.2)
BUN SERPL-MCNC: 16 MG/DL — SIGNIFICANT CHANGE UP (ref 7–23)
CALCIUM SERPL-MCNC: 9.1 MG/DL — SIGNIFICANT CHANGE UP (ref 8.4–10.5)
CHLORIDE SERPL-SCNC: 107 MMOL/L — SIGNIFICANT CHANGE UP (ref 96–108)
CO2 SERPL-SCNC: 22 MMOL/L — SIGNIFICANT CHANGE UP (ref 22–31)
CREAT SERPL-MCNC: 1.04 MG/DL — SIGNIFICANT CHANGE UP (ref 0.5–1.3)
EGFR: 75 ML/MIN/1.73M2 — SIGNIFICANT CHANGE UP
GLUCOSE SERPL-MCNC: 126 MG/DL — HIGH (ref 70–99)
HCT VFR BLD CALC: 43 % — SIGNIFICANT CHANGE UP (ref 39–50)
HGB BLD-MCNC: 14.3 G/DL — SIGNIFICANT CHANGE UP (ref 13–17)
MCHC RBC-ENTMCNC: 31.6 PG — SIGNIFICANT CHANGE UP (ref 27–34)
MCHC RBC-ENTMCNC: 33.3 GM/DL — SIGNIFICANT CHANGE UP (ref 32–36)
MCV RBC AUTO: 94.9 FL — SIGNIFICANT CHANGE UP (ref 80–100)
NRBC # BLD: 0 /100 WBCS — SIGNIFICANT CHANGE UP (ref 0–0)
PLATELET # BLD AUTO: 154 K/UL — SIGNIFICANT CHANGE UP (ref 150–400)
POTASSIUM SERPL-MCNC: 4.1 MMOL/L — SIGNIFICANT CHANGE UP (ref 3.5–5.3)
POTASSIUM SERPL-SCNC: 4.1 MMOL/L — SIGNIFICANT CHANGE UP (ref 3.5–5.3)
PROT SERPL-MCNC: 6.2 G/DL — SIGNIFICANT CHANGE UP (ref 6–8.3)
RBC # BLD: 4.53 M/UL — SIGNIFICANT CHANGE UP (ref 4.2–5.8)
RBC # FLD: 11.3 % — SIGNIFICANT CHANGE UP (ref 10.3–14.5)
SODIUM SERPL-SCNC: 141 MMOL/L — SIGNIFICANT CHANGE UP (ref 135–145)
WBC # BLD: 5.82 K/UL — SIGNIFICANT CHANGE UP (ref 3.8–10.5)
WBC # FLD AUTO: 5.82 K/UL — SIGNIFICANT CHANGE UP (ref 3.8–10.5)

## 2022-09-25 PROCEDURE — 93880 EXTRACRANIAL BILAT STUDY: CPT | Mod: 26

## 2022-09-25 PROCEDURE — 99232 SBSQ HOSP IP/OBS MODERATE 35: CPT

## 2022-09-25 RX ADMIN — TAMSULOSIN HYDROCHLORIDE 0.4 MILLIGRAM(S): 0.4 CAPSULE ORAL at 21:20

## 2022-09-25 RX ADMIN — HEPARIN SODIUM 15 UNIT(S)/HR: 5000 INJECTION INTRAVENOUS; SUBCUTANEOUS at 16:27

## 2022-09-25 RX ADMIN — FINASTERIDE 5 MILLIGRAM(S): 5 TABLET, FILM COATED ORAL at 11:28

## 2022-09-25 RX ADMIN — Medication 12.5 MILLIGRAM(S): at 17:03

## 2022-09-25 RX ADMIN — HEPARIN SODIUM 15 UNIT(S)/HR: 5000 INJECTION INTRAVENOUS; SUBCUTANEOUS at 11:27

## 2022-09-25 RX ADMIN — ATORVASTATIN CALCIUM 80 MILLIGRAM(S): 80 TABLET, FILM COATED ORAL at 21:22

## 2022-09-25 RX ADMIN — Medication 81 MILLIGRAM(S): at 11:28

## 2022-09-25 RX ADMIN — AMLODIPINE BESYLATE 2.5 MILLIGRAM(S): 2.5 TABLET ORAL at 06:00

## 2022-09-25 RX ADMIN — Medication 12.5 MILLIGRAM(S): at 06:00

## 2022-09-25 RX ADMIN — HEPARIN SODIUM 15 UNIT(S)/HR: 5000 INJECTION INTRAVENOUS; SUBCUTANEOUS at 01:51

## 2022-09-25 NOTE — PROGRESS NOTE ADULT - ASSESSMENT
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   9/24 VSS: continue asa/ bb/ statin and hep gttp  preop work up in progress  ck carotids/ pft's/ type and screen   9/25 VSS  OR planned for tue 9/27 with Dr. Ford

## 2022-09-25 NOTE — PROGRESS NOTE ADULT - SUBJECTIVE AND OBJECTIVE BOX
VITAL SIGNS-Telemetry:  AFib   Vital Signs Last 24 Hrs  T(C): 36.8 (22 @ 04:00), Max: 36.9 (22 @ 20:53)  T(F): 98.3 (22 @ 04:00), Max: 98.4 (22 @ 20:53)  HR: 66 (22 @ 04:00) (64 - 67)  BP: 123/71 (22 @ 04:00) (123/71 - 136/84)  RR: 18 (22 @ 04:00) (18 - 18)  SpO2: 94% (22 @ 04:00) (93% - 96%)          @ 07:01  -   @ 07:00  --------------------------------------------------------  IN: 1594 mL / OUT: 900 mL / NET: 694 mL     @ 07:01  -   @ 11:37  --------------------------------------------------------  IN: 360 mL / OUT: 400 mL / NET: -40 mL    Daily     Daily Weight in k.1 (25 Sep 2022 09:29)        PHYSICAL EXAM:  Neurology: alert and oriented x 3, nonfocal, no gross deficits  CV : IRR IRR  Lungs: cta  Abdomen: soft, nontender, nondistended, positive bowel sounds, last bowel movement         Extremities:     no c/c/e    amLODIPine   Tablet 2.5 milliGRAM(s) Oral daily  aspirin enteric coated 81 milliGRAM(s) Oral daily  atorvastatin 80 milliGRAM(s) Oral at bedtime  finasteride 5 milliGRAM(s) Oral daily  heparin  Infusion 1100 Unit(s)/Hr IV Continuous <Continuous>  melatonin 3 milliGRAM(s) Oral at bedtime PRN  metoprolol tartrate 12.5 milliGRAM(s) Oral two times a day  sodium chloride 0.9%. 1000 milliLiter(s) IV Continuous <Continuous>  tamsulosin 0.4 milliGRAM(s) Oral at bedtime    Physical Therapy Rec:   Home  [ x ]   Home w/ PT  [  ]  Rehab  [  ]  Discussed with Cardiothoracic Team at AM rounds. VITAL SIGNS-Telemetry; sr 55-70  Vital Signs Last 24 Hrs  T(C): 36.8 (22 @ 04:00), Max: 36.9 (22 @ 20:53)  T(F): 98.3 (22 @ 04:00), Max: 98.4 (22 @ 20:53)  HR: 66 (22 @ 04:00) (64 - 67)  BP: 123/71 (22 @ 04:00) (123/71 - 136/84)  RR: 18 (22 @ 04:00) (18 - 18)  SpO2: 94% (22 @ 04:00) (93% - 96%)          @ 07:01  -   @ 07:00  --------------------------------------------------------  IN: 1594 mL / OUT: 900 mL / NET: 694 mL     @ 07:01  -   @ 11:37  --------------------------------------------------------  IN: 360 mL / OUT: 400 mL / NET: -40 mL    Daily     Daily Weight in k.1 (25 Sep 2022 09:29)        PHYSICAL EXAM:  Neurology: alert and oriented x 3, nonfocal, no gross deficits  CV : S1S2  Lungs: cta  Abdomen: soft, nontender, nondistended, positive bowel sounds, last bowel movement         Extremities:     no c/c/e    amLODIPine   Tablet 2.5 milliGRAM(s) Oral daily  aspirin enteric coated 81 milliGRAM(s) Oral daily  atorvastatin 80 milliGRAM(s) Oral at bedtime  finasteride 5 milliGRAM(s) Oral daily  heparin  Infusion 1100 Unit(s)/Hr IV Continuous <Continuous>  melatonin 3 milliGRAM(s) Oral at bedtime PRN  metoprolol tartrate 12.5 milliGRAM(s) Oral two times a day  sodium chloride 0.9%. 1000 milliLiter(s) IV Continuous <Continuous>  tamsulosin 0.4 milliGRAM(s) Oral at bedtime    Physical Therapy Rec:   Home  [ x ]   Home w/ PT  [  ]  Rehab  [  ]  Discussed with Cardiothoracic Team at AM rounds.

## 2022-09-26 ENCOUNTER — TRANSCRIPTION ENCOUNTER (OUTPATIENT)
Age: 72
End: 2022-09-26

## 2022-09-26 PROBLEM — Z87.438 PERSONAL HISTORY OF OTHER DISEASES OF MALE GENITAL ORGANS: Chronic | Status: ACTIVE | Noted: 2022-09-22

## 2022-09-26 LAB
ALBUMIN SERPL ELPH-MCNC: 3.5 G/DL — SIGNIFICANT CHANGE UP (ref 3.3–5)
ALP SERPL-CCNC: 56 U/L — SIGNIFICANT CHANGE UP (ref 40–120)
ALT FLD-CCNC: 22 U/L — SIGNIFICANT CHANGE UP (ref 10–45)
ANION GAP SERPL CALC-SCNC: 8 MMOL/L — SIGNIFICANT CHANGE UP (ref 5–17)
APTT BLD: 71 SEC — HIGH (ref 27.5–35.5)
APTT BLD: 72.5 SEC — HIGH (ref 27.5–35.5)
AST SERPL-CCNC: 24 U/L — SIGNIFICANT CHANGE UP (ref 10–40)
BILIRUB SERPL-MCNC: 0.7 MG/DL — SIGNIFICANT CHANGE UP (ref 0.2–1.2)
BLD GP AB SCN SERPL QL: NEGATIVE — SIGNIFICANT CHANGE UP
BUN SERPL-MCNC: 18 MG/DL — SIGNIFICANT CHANGE UP (ref 7–23)
CALCIUM SERPL-MCNC: 8.9 MG/DL — SIGNIFICANT CHANGE UP (ref 8.4–10.5)
CHLORIDE SERPL-SCNC: 108 MMOL/L — SIGNIFICANT CHANGE UP (ref 96–108)
CO2 SERPL-SCNC: 25 MMOL/L — SIGNIFICANT CHANGE UP (ref 22–31)
CREAT SERPL-MCNC: 1.13 MG/DL — SIGNIFICANT CHANGE UP (ref 0.5–1.3)
EGFR: 68 ML/MIN/1.73M2 — SIGNIFICANT CHANGE UP
GLUCOSE SERPL-MCNC: 137 MG/DL — HIGH (ref 70–99)
HCT VFR BLD CALC: 41.3 % — SIGNIFICANT CHANGE UP (ref 39–50)
HGB BLD-MCNC: 13.9 G/DL — SIGNIFICANT CHANGE UP (ref 13–17)
MCHC RBC-ENTMCNC: 31.7 PG — SIGNIFICANT CHANGE UP (ref 27–34)
MCHC RBC-ENTMCNC: 33.7 GM/DL — SIGNIFICANT CHANGE UP (ref 32–36)
MCV RBC AUTO: 94.3 FL — SIGNIFICANT CHANGE UP (ref 80–100)
NRBC # BLD: 0 /100 WBCS — SIGNIFICANT CHANGE UP (ref 0–0)
PA ADP PRP-ACNC: 159 PRU — LOW (ref 194–417)
PLATELET # BLD AUTO: 142 K/UL — LOW (ref 150–400)
POTASSIUM SERPL-MCNC: 3.8 MMOL/L — SIGNIFICANT CHANGE UP (ref 3.5–5.3)
POTASSIUM SERPL-SCNC: 3.8 MMOL/L — SIGNIFICANT CHANGE UP (ref 3.5–5.3)
PROT SERPL-MCNC: 5.8 G/DL — LOW (ref 6–8.3)
RBC # BLD: 4.38 M/UL — SIGNIFICANT CHANGE UP (ref 4.2–5.8)
RBC # FLD: 11.3 % — SIGNIFICANT CHANGE UP (ref 10.3–14.5)
RH IG SCN BLD-IMP: POSITIVE — SIGNIFICANT CHANGE UP
SARS-COV-2 RNA SPEC QL NAA+PROBE: SIGNIFICANT CHANGE UP
SODIUM SERPL-SCNC: 141 MMOL/L — SIGNIFICANT CHANGE UP (ref 135–145)
WBC # BLD: 4.65 K/UL — SIGNIFICANT CHANGE UP (ref 3.8–10.5)
WBC # FLD AUTO: 4.65 K/UL — SIGNIFICANT CHANGE UP (ref 3.8–10.5)

## 2022-09-26 PROCEDURE — 94010 BREATHING CAPACITY TEST: CPT | Mod: 26

## 2022-09-26 PROCEDURE — 99223 1ST HOSP IP/OBS HIGH 75: CPT

## 2022-09-26 RX ORDER — ASCORBIC ACID 60 MG
2000 TABLET,CHEWABLE ORAL ONCE
Refills: 0 | Status: COMPLETED | OUTPATIENT
Start: 2022-09-26 | End: 2022-09-26

## 2022-09-26 RX ORDER — CHLORHEXIDINE GLUCONATE 213 G/1000ML
1 SOLUTION TOPICAL ONCE
Refills: 0 | Status: COMPLETED | OUTPATIENT
Start: 2022-09-26 | End: 2022-09-26

## 2022-09-26 RX ORDER — POTASSIUM CHLORIDE 20 MEQ
20 PACKET (EA) ORAL ONCE
Refills: 0 | Status: COMPLETED | OUTPATIENT
Start: 2022-09-26 | End: 2022-09-26

## 2022-09-26 RX ORDER — CHLORHEXIDINE GLUCONATE 213 G/1000ML
30 SOLUTION TOPICAL ONCE
Refills: 0 | Status: COMPLETED | OUTPATIENT
Start: 2022-09-27 | End: 2022-09-27

## 2022-09-26 RX ORDER — CHLORHEXIDINE GLUCONATE 213 G/1000ML
1 SOLUTION TOPICAL ONCE
Refills: 0 | Status: COMPLETED | OUTPATIENT
Start: 2022-09-26 | End: 2022-09-27

## 2022-09-26 RX ORDER — GABAPENTIN 400 MG/1
300 CAPSULE ORAL ONCE
Refills: 0 | Status: COMPLETED | OUTPATIENT
Start: 2022-09-27 | End: 2022-09-27

## 2022-09-26 RX ORDER — CEFUROXIME AXETIL 250 MG
1500 TABLET ORAL ONCE
Refills: 0 | Status: DISCONTINUED | OUTPATIENT
Start: 2022-09-27 | End: 2022-09-27

## 2022-09-26 RX ORDER — ACETAMINOPHEN 500 MG
1000 TABLET ORAL ONCE
Refills: 0 | Status: COMPLETED | OUTPATIENT
Start: 2022-09-27 | End: 2022-09-27

## 2022-09-26 RX ADMIN — Medication 12.5 MILLIGRAM(S): at 17:11

## 2022-09-26 RX ADMIN — Medication 2000 MILLIGRAM(S): at 22:09

## 2022-09-26 RX ADMIN — TAMSULOSIN HYDROCHLORIDE 0.4 MILLIGRAM(S): 0.4 CAPSULE ORAL at 22:08

## 2022-09-26 RX ADMIN — Medication 12.5 MILLIGRAM(S): at 06:03

## 2022-09-26 RX ADMIN — ATORVASTATIN CALCIUM 80 MILLIGRAM(S): 80 TABLET, FILM COATED ORAL at 22:09

## 2022-09-26 RX ADMIN — CHLORHEXIDINE GLUCONATE 1 APPLICATION(S): 213 SOLUTION TOPICAL at 21:40

## 2022-09-26 RX ADMIN — Medication 20 MILLIEQUIVALENT(S): at 12:10

## 2022-09-26 RX ADMIN — FINASTERIDE 5 MILLIGRAM(S): 5 TABLET, FILM COATED ORAL at 12:11

## 2022-09-26 RX ADMIN — AMLODIPINE BESYLATE 2.5 MILLIGRAM(S): 2.5 TABLET ORAL at 06:02

## 2022-09-26 RX ADMIN — Medication 81 MILLIGRAM(S): at 12:11

## 2022-09-26 NOTE — PROGRESS NOTE ADULT - ASSESSMENT
73yo man with BPH, admitted with NSTEMI, s/p LHC with multivessel CAD, undergoing CABG evaluation.   9/24 VSS: continue asa/ bb/ statin and hep gttp  preop work up in progress  ck carotids/ pft's/ type and screen   9/25 VSS  OR planned for tue 9/27 with Dr. Ford   9/26 VSS, hep gtt, plan for OR tomorrow, Covid pending, NPO after MN.

## 2022-09-26 NOTE — PROGRESS NOTE ADULT - PROBLEM SELECTOR PLAN 3
continue asa/ bb/ statin and hep gttp  preop work up in progress  Covid pending NPO after MN  OR planned for tue 9/27 with Dr. Ford

## 2022-09-26 NOTE — PROGRESS NOTE ADULT - SUBJECTIVE AND OBJECTIVE BOX
I met with Mr Eubanks and his wife together with a .    I discussed the rationale, risks and benefits of CABG surgery.    The risk of death is 1%, risk of stroke is 1%, risk of infection, bleeding and wound infection total around 5-10%.    He is willing to proceed with surgery tomorrow.    Roosevelt Ford MD  Cardiac Surgeon

## 2022-09-26 NOTE — PROGRESS NOTE ADULT - SUBJECTIVE AND OBJECTIVE BOX
SUBJ: no CP, no SOB    MEDICATIONS  (STANDING):  acetaminophen     Tablet .. 1000 milliGRAM(s) Oral once  amLODIPine   Tablet 2.5 milliGRAM(s) Oral daily  ascorbic acid 2000 milliGRAM(s) Oral once  aspirin enteric coated 81 milliGRAM(s) Oral daily  atorvastatin 80 milliGRAM(s) Oral at bedtime  cefuroxime  IVPB 1500 milliGRAM(s) IV Intermittent once  chlorhexidine 0.12% Liquid 30 milliLiter(s) Swish and Spit once  chlorhexidine 4% Liquid 1 Application(s) Topical once  chlorhexidine 4% Liquid 1 Application(s) Topical once  finasteride 5 milliGRAM(s) Oral daily  gabapentin 300 milliGRAM(s) Oral once  heparin  Infusion 1100 Unit(s)/Hr (15 mL/Hr) IV Continuous <Continuous>  metoprolol tartrate 12.5 milliGRAM(s) Oral two times a day  sodium chloride 0.9%. 1000 milliLiter(s) (100 mL/Hr) IV Continuous <Continuous>  tamsulosin 0.4 milliGRAM(s) Oral at bedtime    MEDICATIONS  (PRN):  melatonin 3 milliGRAM(s) Oral at bedtime PRN Insomnia      Vital Signs Last 24 Hrs  T(C): 36.6 (26 Sep 2022 11:43), Max: 37 (25 Sep 2022 19:22)  T(F): 97.8 (26 Sep 2022 11:43), Max: 98.6 (25 Sep 2022 19:22)  HR: 59 (26 Sep 2022 11:43) (59 - 66)  BP: 130/79 (26 Sep 2022 11:43) (114/70 - 132/80)  BP(mean): 85 (25 Sep 2022 16:47) (85 - 85)  RR: 18 (26 Sep 2022 11:43) (17 - 18)  SpO2: 96% (26 Sep 2022 11:43) (92% - 96%)    Parameters below as of 26 Sep 2022 11:43  Patient On (Oxygen Delivery Method): room air        REVIEW OF SYSTEMS:  CONSTITUTIONAL: No fever, weight loss, or fatigue  EYES: No eye pain, visual disturbances, or discharge  ENMT:  No difficulty hearing, tinnitus, vertigo; No sinus or throat pain  NECK: No pain or stiffness  RESPIRATORY: No cough, wheezing, chills or hemoptysis; No shortness of breath  CARDIOVASCULAR: No chest pain, palpitations, dizziness, or leg swelling  GASTROINTESTINAL: No abdominal or epigastric pain. No nausea, vomiting, or hematemesis; No diarrhea or constipation. No melena or hematochezia.  GENITOURINARY: No dysuria, frequency, hematuria, or incontinence  NEUROLOGICAL: No headaches, memory loss, loss of strength, numbness, or tremors  SKIN: No itching, burning, rashes, or lesions   LYMPH NODES: No enlarged glands  ENDOCRINE: No heat or cold intolerance; No hair loss  MUSCULOSKELETAL: No joint pain or swelling; No muscle, back, or extremity pain  PSYCHIATRIC: No depression, anxiety, mood swings, or difficulty sleeping  HEME/LYMPH: No easy bruising, or bleeding gums  ALLERY AND IMMUNOLOGIC: No hives or eczema          PHYSICAL EXAM:  · CONSTITUTIONAL: Well-developed, well nourished      · EYES: EOMI; PERRL; no drainage or redness  · NECK: No bruits; no thyromegaly or nodules  ·RESPIRATORY:   airway patent; breath sounds equal; good air movement; respirations non-labored; clear to auscultation bilaterally; no chest wall tenderness; no intercostal retractions; no rales,rhonchi or wheeze  · CARDIOVASCULAR: regular rate and rhythm  no rub  no murmur  normal PMI  . GASTROINTESTINAL:  no distention; no masses palpable; bowel sounds normal  · EXTREMITIES: No cyanosis, clubbing or edema  · VASCULAR:  Equal and normal pulses (radial, femoral)  ·NEUROLOGICAL:  Alert and oriented x 3; sensation intact; deep reflexes intact; cranial nerves intact; no spontaneous movement; superficial reflexes intact; normal strength  · SKIN: No lesions; no rash  . LYMPH NODES: No lymphadedenopathy  · MUSCULOSKELETAL:  No calf tenderness  no joint swelling	    TELEMETRY: SB-SR 49-70    ECG:    TTE:images reviewed  no sig valvular disease     1. Mild segmental left ventricular systolic dysfunction.  Endocardial visualization enhanced with intravenous  injection of Ultrasonic Enhancing Agent (Lumason). No left  ventricular thrombus. The basal to mid inferoseptal,  anterolateral and basal inferior walls are hypokinetic.  2. Mild diastolic dysfunction (Stage I).  3. Right ventricular enlargement with mildly decreased  right ventricular systolic function.  4. Normal tricuspid valve. Mild tricuspid regurgitation.  5. Estimated pulmonary artery systolic pressure equals 32  mm Hg, assuming right atrial pressure equals 8  mm Hg,  consistent with normal pulmonary pressures.  6. No pericardial effusion.  *** No previous Echo exam.  ------------------------------------------------------------------------  Confirmed on  9/23/2022 - 19:36:53 by ZULMA Centeno    LABS:   CBC Full  -  ( 26 Sep 2022 03:09 )  WBC Count : 4.65 K/uL  RBC Count : 4.38 M/uL  Hemoglobin : 13.9 g/dL  Hematocrit : 41.3 %  Platelet Count - Automated : 142 K/uL  Mean Cell Volume : 94.3 fl  Mean Cell Hemoglobin : 31.7 pg  Mean Cell Hemoglobin Concentration : 33.7 gm/dL  Auto Neutrophil # : x  Auto Lymphocyte # : x  Auto Monocyte # : x  Auto Eosinophil # : x  Auto Basophil # : x  Auto Neutrophil % : x  Auto Lymphocyte % : x  Auto Monocyte % : x  Auto Eosinophil % : x  Auto Basophil % : x      09-26    141  |  108  |  18  ----------------------------<  137<H>  3.8   |  25  |  1.13    Ca    8.9      26 Sep 2022 03:09    TPro  5.8<L>  /  Alb  3.5  /  TBili  0.7  /  DBili  x   /  AST  24  /  ALT  22  /  AlkPhos  56  09-26          RADIOLOGY & ADDITIONAL STUDIES:    IMPRESSION AND PLAN:    Jerrica VERDE 73 yo M PMH BPH, HTN,  who presented via transfer ith NSTEMI found to have multi vessel disease for CABG 9/27 Dr Ford  undergoing pre CABG workup    CAD   for CABG  continue asa 81mg  continue metoprolol  continue lipitor 80mg    HTN  lopressor 12.5bid, change to toprol XL upon DC  continue norvasc for now  may change to losartan vs entresto prior to DC depending on EF and WM on pre dc TTE    cardiology is available for any questions in the connor-op period    Liam Lund MD, PhD  Cardiology Attending  [facility]    For day time coverage Mon-Fri see Non-Service Consult Attending on [email or web address], password: [email or web address]; daytime weekends covered by general cardiology consult service attending.)

## 2022-09-26 NOTE — PROGRESS NOTE ADULT - SUBJECTIVE AND OBJECTIVE BOX
Cardiac Surgery Pre-op Note:     Surgeon: Dr. Ford    Procedure: (Date) (Procedure) 2022, CABG    HPI:  Patient is a 72y Male with PMH of BPH, HTN, presented to OSH with chest pain x1 day, transferred to [facility] for NSTEMI. History obtained from patient using  (PI #[id number]). Patient was woken up last night around midnight with sudden-onset left-sided chest pain. The pain did not radiate anywhere else, was not associated with SOB or diaphoresis. He did have some mild dizziness. At OSH he was loaded with aspirin and plavix and received nitroglycerin x2, chest pain is now resolved. Patient denies fever, chills, abdominal pain, nausea, vomiting, changes in bowel habits, or urinary symptoms. He has no personal or family history of heart problems, never smoked cigarettes.     In the ED, afebrile, VSS. Labs significant for trop 618. CXR shows mild atelectasis at the lung bases b/l. Started on heparin gtt. (22 Sep 2022 23:26)      PAST MEDICAL & SURGICAL HISTORY:  History of BPH      History of BPH      No significant past surgical history    MEDICATIONS  (STANDING):  amLODIPine   Tablet 2.5 milliGRAM(s) Oral daily  aspirin enteric coated 81 milliGRAM(s) Oral daily  atorvastatin 80 milliGRAM(s) Oral at bedtime  finasteride 5 milliGRAM(s) Oral daily  heparin  Infusion 1100 Unit(s)/Hr (15 mL/Hr) IV Continuous <Continuous>  metoprolol tartrate 12.5 milliGRAM(s) Oral two times a day  sodium chloride 0.9%. 1000 milliLiter(s) (100 mL/Hr) IV Continuous <Continuous>  tamsulosin 0.4 milliGRAM(s) Oral at bedtime    MEDICATIONS  (PRN):  melatonin 3 milliGRAM(s) Oral at bedtime PRN Insomnia      Vital Signs Last 24 Hrs  T(C): 36.6 (22 @ 11:43), Max: 37 (22 @ 19:22)  T(F): 97.8 (22 @ 11:43), Max: 98.6 (22 @ 19:22)  HR: 59 (22 @ 11:43) (59 - 66)  BP: 130/79 (22 @ 11:43) (113/70 - 132/80)  RR: 18 (22 @ 11:43) (17 - 18)  SpO2: 96% (22 @ 11:43) (92% - 96%)          ABO Interpretation: O ( @ 09:15)     Daily     Daily Weight in k.2 (26 Sep 2022 07:00)  Admit Wt: Drug Dosing Weight  Height (cm): 180.3 (23 Sep 2022 10:56)  Weight (kg): 83.5 (23 Sep 2022 10:56)  BMI (kg/m2): 25.7 (23 Sep 2022 10:56)  BSA (m2): 2.04 (23 Sep 2022 10:56)    Labs:                        13.9   4.65  )-----------( 142      ( 26 Sep 2022 03:09 )             41.3         141  |  108  |  18  ----------------------------<  137<H>  3.8   |  25  |  1.13    Ca    8.9      26 Sep 2022 03:09    TPro  5.8<L>  /  Alb  3.5  /  TBili  0.7  /  DBili  x   /  AST  24  /  ALT  22  /  AlkPhos  56      PTT - ( 26 Sep 2022 08:52 )  PTT:72.5 sec    ABO Interpretation: O ( @ 09:15)      Serum Pro-Brain Natriuretic Peptide: 605 pg/mL ( @ 16:20)    Thyroid Panel:  @ 06:37/2.13  --/--/--    MRSA: MRSA PCR Result.: NotDetec ( @ 17:56)   / MSSA:     P2Y12 P2Y12 Plt Response Test . (22 @ 08:52)   P2Y12 Plt Reactivity: 159 PRU     CXR: < from: Xray Chest 1 View-PORTABLE IMMEDIATE (Xray Chest 1 View-PORTABLE IMMEDIATE .) (22 @ 17:18) >  FINDINGS:  A portable AP upright view of the chest demonstrates mild linear  atelectasis at both lung bases. No infiltrates are seen. There is no   pneumothorax. There are no pleural effusions. There is no hilar or   mediastinal widening. Heart size appears normal without pulmonary edema.   There are degenerative changes ofthe spine.    IMPRESSION:  1. Mild linear atelectasis in both lower lobes.  2. No evidence of pneumonia, pleural effusion or CHF.    --- End of Report ---    < end of copied text >      Carotid Duplex:  < from: VA Duplex Carotid, Bilat (22 @ 10:50) >  IMPRESSION:    Bilateral plaque. No significant hemodynamic stenosis of either internal   carotid artery.    Measurement of carotid stenosis is based on velocity parameters that   correlate the residual internal carotid diameter with that of the more   distal vessel in accordance with a method such as the [place] American   Symptomatic Carotid Endarterectomy Trial (NASCET).    --- End of Report ---    < end of copied text >      PFT's: pending    Echocardiogram: < from: TTE with Doppler (w/Cont) (22 @ 10:47) >  Conclusions:  1. Mild segmental left ventricular systolic dysfunction.  Endocardial visualization enhanced with intravenous  injection of Ultrasonic Enhancing Agent (Lumason). No left  ventricular thrombus. The basal to mid inferoseptal,  anterolateral and basal inferior walls are hypokinetic.  2. Mild diastolic dysfunction (Stage I).  3. Right ventricular enlargement with mildly decreased  right ventricular systolic function.  4. Normal tricuspid valve. Mild tricuspid regurgitation.  5. Estimated pulmonary artery systolic pressure equals 32  mm Hg, assuming right atrial pressure equals8  mm Hg,  consistent with normal pulmonary pressures.  6. No pericardial effusion.  *** No previous Echo exam.  ------------------------------------------------------------------------  Confirmed on  2022 - 19:36:53 by ZULMA Centeno    < end of copied text >    Cardiac catheterization:    PHYSICAL EXAM:  Neuro: A&Ox3, NAD  Pulm: B/L BS CTA  CV: +S1S2  Abd: Soft, NT/ND +[id number]  Ext: B/L LE no edema, +PP B/L, no calf tenderness    Pt has AICD/PPM [ ] Yes  [x ] No             Brand Name:  Pre-op Beta Blocker ordered within 24 hrs of surgery (CABG ONLY)?  [x ] Yes  [ ] No  If not, Why?  Type & Cross  [ x ] Yes  [ ] No  NPO after Midnight [x ] Yes  [ ] No  Pre-op ABX ordered, to be taped on chart:  [ x] Yes  [ ] No     Hibiclens/Peridex ordered [x ] Yes  [ ] No  Intraop on Hold: PRBCs, CXR, OWEN [x ]   Consent obtained  [ ] Yes  [ ] No     in chart

## 2022-09-27 ENCOUNTER — APPOINTMENT (OUTPATIENT)
Dept: CARDIOTHORACIC SURGERY | Facility: HOSPITAL | Age: 72
End: 2022-09-27

## 2022-09-27 ENCOUNTER — TRANSCRIPTION ENCOUNTER (OUTPATIENT)
Age: 72
End: 2022-09-27

## 2022-09-27 LAB
ALBUMIN SERPL ELPH-MCNC: 3.1 G/DL — LOW (ref 3.3–5)
ALP SERPL-CCNC: 37 U/L — LOW (ref 40–120)
ALT FLD-CCNC: 31 U/L — SIGNIFICANT CHANGE UP (ref 10–45)
ANION GAP SERPL CALC-SCNC: 12 MMOL/L — SIGNIFICANT CHANGE UP (ref 5–17)
APTT BLD: 27.4 SEC — LOW (ref 27.5–35.5)
AST SERPL-CCNC: 54 U/L — HIGH (ref 10–40)
BASE EXCESS BLDV CALC-SCNC: -0.7 MMOL/L — SIGNIFICANT CHANGE UP (ref -2–3)
BASE EXCESS BLDV CALC-SCNC: -1.4 MMOL/L — SIGNIFICANT CHANGE UP (ref -2–3)
BASE EXCESS BLDV CALC-SCNC: -1.7 MMOL/L — SIGNIFICANT CHANGE UP (ref -2–3)
BASE EXCESS BLDV CALC-SCNC: -2.8 MMOL/L — LOW (ref -2–3)
BASE EXCESS BLDV CALC-SCNC: -3.8 MMOL/L — LOW (ref -2–3)
BASE EXCESS BLDV CALC-SCNC: 0.7 MMOL/L — SIGNIFICANT CHANGE UP (ref -2–3)
BASE EXCESS BLDV CALC-SCNC: 1.6 MMOL/L — SIGNIFICANT CHANGE UP (ref -2–3)
BASE EXCESS BLDV CALC-SCNC: 3.6 MMOL/L — HIGH (ref -2–3)
BASOPHILS # BLD AUTO: 0.02 K/UL — SIGNIFICANT CHANGE UP (ref 0–0.2)
BASOPHILS NFR BLD AUTO: 0.2 % — SIGNIFICANT CHANGE UP (ref 0–2)
BILIRUB SERPL-MCNC: 1.8 MG/DL — HIGH (ref 0.2–1.2)
BLOOD GAS VENOUS - CREATININE: SIGNIFICANT CHANGE UP MG/DL (ref 0.5–1.3)
BUN SERPL-MCNC: 12 MG/DL — SIGNIFICANT CHANGE UP (ref 7–23)
CA-I SERPL-SCNC: 0.89 MMOL/L — LOW (ref 1.15–1.33)
CA-I SERPL-SCNC: 0.92 MMOL/L — LOW (ref 1.15–1.33)
CA-I SERPL-SCNC: 0.96 MMOL/L — LOW (ref 1.15–1.33)
CA-I SERPL-SCNC: 1.01 MMOL/L — LOW (ref 1.15–1.33)
CA-I SERPL-SCNC: 1.05 MMOL/L — LOW (ref 1.15–1.33)
CA-I SERPL-SCNC: 1.06 MMOL/L — LOW (ref 1.15–1.33)
CA-I SERPL-SCNC: 1.16 MMOL/L — SIGNIFICANT CHANGE UP (ref 1.15–1.33)
CALCIUM SERPL-MCNC: 8.4 MG/DL — SIGNIFICANT CHANGE UP (ref 8.4–10.5)
CHLORIDE BLDV-SCNC: 102 MMOL/L — SIGNIFICANT CHANGE UP (ref 96–108)
CHLORIDE BLDV-SCNC: 105 MMOL/L — SIGNIFICANT CHANGE UP (ref 96–108)
CHLORIDE BLDV-SCNC: 105 MMOL/L — SIGNIFICANT CHANGE UP (ref 96–108)
CHLORIDE BLDV-SCNC: 106 MMOL/L — SIGNIFICANT CHANGE UP (ref 96–108)
CHLORIDE BLDV-SCNC: 107 MMOL/L — SIGNIFICANT CHANGE UP (ref 96–108)
CHLORIDE BLDV-SCNC: 110 MMOL/L — HIGH (ref 96–108)
CHLORIDE BLDV-SCNC: 111 MMOL/L — HIGH (ref 96–108)
CHLORIDE SERPL-SCNC: 112 MMOL/L — HIGH (ref 96–108)
CK MB BLD-MCNC: 12.2 % — HIGH (ref 0–3.5)
CK MB CFR SERPL CALC: 38.4 NG/ML — HIGH (ref 0–6.7)
CK SERPL-CCNC: 316 U/L — HIGH (ref 30–200)
CO2 BLDV-SCNC: 25 MMOL/L — SIGNIFICANT CHANGE UP (ref 22–26)
CO2 BLDV-SCNC: 25 MMOL/L — SIGNIFICANT CHANGE UP (ref 22–26)
CO2 BLDV-SCNC: 26 MMOL/L — SIGNIFICANT CHANGE UP (ref 22–26)
CO2 BLDV-SCNC: 27 MMOL/L — HIGH (ref 22–26)
CO2 BLDV-SCNC: 28 MMOL/L — HIGH (ref 22–26)
CO2 BLDV-SCNC: 30 MMOL/L — HIGH (ref 22–26)
CO2 SERPL-SCNC: 22 MMOL/L — SIGNIFICANT CHANGE UP (ref 22–31)
CREAT SERPL-MCNC: 0.88 MG/DL — SIGNIFICANT CHANGE UP (ref 0.5–1.3)
EGFR: 90 ML/MIN/1.73M2 — SIGNIFICANT CHANGE UP
EOSINOPHIL # BLD AUTO: 0 K/UL — SIGNIFICANT CHANGE UP (ref 0–0.5)
EOSINOPHIL NFR BLD AUTO: 0 % — SIGNIFICANT CHANGE UP (ref 0–6)
FIBRINOGEN PPP-MCNC: 324 MG/DL — LOW (ref 330–520)
GAS PNL BLDA: SIGNIFICANT CHANGE UP
GAS PNL BLDV: 135 MMOL/L — LOW (ref 136–145)
GAS PNL BLDV: 135 MMOL/L — LOW (ref 136–145)
GAS PNL BLDV: 136 MMOL/L — SIGNIFICANT CHANGE UP (ref 136–145)
GAS PNL BLDV: 138 MMOL/L — SIGNIFICANT CHANGE UP (ref 136–145)
GAS PNL BLDV: 140 MMOL/L — SIGNIFICANT CHANGE UP (ref 136–145)
GAS PNL BLDV: 141 MMOL/L — SIGNIFICANT CHANGE UP (ref 136–145)
GAS PNL BLDV: 142 MMOL/L — SIGNIFICANT CHANGE UP (ref 136–145)
GAS PNL BLDV: SIGNIFICANT CHANGE UP
GLUCOSE BLDV-MCNC: 144 MG/DL — HIGH (ref 70–99)
GLUCOSE BLDV-MCNC: 145 MG/DL — HIGH (ref 70–99)
GLUCOSE BLDV-MCNC: 146 MG/DL — HIGH (ref 70–99)
GLUCOSE BLDV-MCNC: 148 MG/DL — HIGH (ref 70–99)
GLUCOSE BLDV-MCNC: 148 MG/DL — HIGH (ref 70–99)
GLUCOSE BLDV-MCNC: 154 MG/DL — HIGH (ref 70–99)
GLUCOSE BLDV-MCNC: 154 MG/DL — HIGH (ref 70–99)
GLUCOSE SERPL-MCNC: 158 MG/DL — HIGH (ref 70–99)
HCO3 BLDV-SCNC: 23 MMOL/L — SIGNIFICANT CHANGE UP (ref 22–29)
HCO3 BLDV-SCNC: 23 MMOL/L — SIGNIFICANT CHANGE UP (ref 22–29)
HCO3 BLDV-SCNC: 24 MMOL/L — SIGNIFICANT CHANGE UP (ref 22–29)
HCO3 BLDV-SCNC: 25 MMOL/L — SIGNIFICANT CHANGE UP (ref 22–29)
HCO3 BLDV-SCNC: 25 MMOL/L — SIGNIFICANT CHANGE UP (ref 22–29)
HCO3 BLDV-SCNC: 26 MMOL/L — SIGNIFICANT CHANGE UP (ref 22–29)
HCO3 BLDV-SCNC: 27 MMOL/L — SIGNIFICANT CHANGE UP (ref 22–29)
HCO3 BLDV-SCNC: 28 MMOL/L — SIGNIFICANT CHANGE UP (ref 22–29)
HCT VFR BLD CALC: 39 % — SIGNIFICANT CHANGE UP (ref 39–50)
HCT VFR BLD CALC: 44.3 % — SIGNIFICANT CHANGE UP (ref 39–50)
HCT VFR BLDA CALC: 29 % — LOW (ref 39–51)
HCT VFR BLDA CALC: 29 % — LOW (ref 39–51)
HCT VFR BLDA CALC: 31 % — LOW (ref 39–51)
HCT VFR BLDA CALC: 32 % — LOW (ref 39–51)
HCT VFR BLDA CALC: 35 % — LOW (ref 39–51)
HCT VFR BLDA CALC: 35 % — LOW (ref 39–51)
HCT VFR BLDA CALC: 41 % — SIGNIFICANT CHANGE UP (ref 39–51)
HEPARINASE TEG R TIME: 7 MIN — SIGNIFICANT CHANGE UP (ref 4.3–8.3)
HGB BLD CALC-MCNC: 10.3 G/DL — LOW (ref 12.6–17.4)
HGB BLD CALC-MCNC: 10.5 G/DL — LOW (ref 12.6–17.4)
HGB BLD CALC-MCNC: 11.5 G/DL — LOW (ref 12.6–17.4)
HGB BLD CALC-MCNC: 11.7 G/DL — LOW (ref 12.6–17.4)
HGB BLD CALC-MCNC: 13.6 G/DL — SIGNIFICANT CHANGE UP (ref 12.6–17.4)
HGB BLD CALC-MCNC: 9.6 G/DL — LOW (ref 12.6–17.4)
HGB BLD CALC-MCNC: 9.7 G/DL — LOW (ref 12.6–17.4)
HGB BLD-MCNC: 13.5 G/DL — SIGNIFICANT CHANGE UP (ref 13–17)
HGB BLD-MCNC: 15 G/DL — SIGNIFICANT CHANGE UP (ref 13–17)
HOROWITZ INDEX BLDV+IHG-RTO: 100 — SIGNIFICANT CHANGE UP
IMM GRANULOCYTES NFR BLD AUTO: 0.7 % — SIGNIFICANT CHANGE UP (ref 0–0.9)
INR BLD: 1.39 RATIO — HIGH (ref 0.88–1.16)
LACTATE BLDV-MCNC: 1.4 MMOL/L — SIGNIFICANT CHANGE UP (ref 0.5–2)
LACTATE BLDV-MCNC: 1.8 MMOL/L — SIGNIFICANT CHANGE UP (ref 0.5–2)
LACTATE BLDV-MCNC: 1.8 MMOL/L — SIGNIFICANT CHANGE UP (ref 0.5–2)
LACTATE BLDV-MCNC: 2.1 MMOL/L — HIGH (ref 0.5–2)
LACTATE BLDV-MCNC: 2.2 MMOL/L — HIGH (ref 0.5–2)
LACTATE BLDV-MCNC: 2.8 MMOL/L — HIGH (ref 0.5–2)
LACTATE BLDV-MCNC: 2.8 MMOL/L — HIGH (ref 0.5–2)
LYMPHOCYTES # BLD AUTO: 1.34 K/UL — SIGNIFICANT CHANGE UP (ref 1–3.3)
LYMPHOCYTES # BLD AUTO: 10.6 % — LOW (ref 13–44)
MAGNESIUM SERPL-MCNC: 3 MG/DL — HIGH (ref 1.6–2.6)
MCHC RBC-ENTMCNC: 31.1 PG — SIGNIFICANT CHANGE UP (ref 27–34)
MCHC RBC-ENTMCNC: 31.5 PG — SIGNIFICANT CHANGE UP (ref 27–34)
MCHC RBC-ENTMCNC: 33.9 GM/DL — SIGNIFICANT CHANGE UP (ref 32–36)
MCHC RBC-ENTMCNC: 34.6 GM/DL — SIGNIFICANT CHANGE UP (ref 32–36)
MCV RBC AUTO: 90.9 FL — SIGNIFICANT CHANGE UP (ref 80–100)
MCV RBC AUTO: 91.9 FL — SIGNIFICANT CHANGE UP (ref 80–100)
MONOCYTES # BLD AUTO: 1.16 K/UL — HIGH (ref 0–0.9)
MONOCYTES NFR BLD AUTO: 9.2 % — SIGNIFICANT CHANGE UP (ref 2–14)
NEUTROPHILS # BLD AUTO: 10 K/UL — HIGH (ref 1.8–7.4)
NEUTROPHILS NFR BLD AUTO: 79.3 % — HIGH (ref 43–77)
NRBC # BLD: 0 /100 WBCS — SIGNIFICANT CHANGE UP (ref 0–0)
NRBC # BLD: 0 /100 WBCS — SIGNIFICANT CHANGE UP (ref 0–0)
PA ADP PRP-ACNC: 198 PRU — SIGNIFICANT CHANGE UP (ref 194–417)
PCO2 BLDV: 43 MMHG — SIGNIFICANT CHANGE UP (ref 42–55)
PCO2 BLDV: 44 MMHG — SIGNIFICANT CHANGE UP (ref 42–55)
PCO2 BLDV: 47 MMHG — SIGNIFICANT CHANGE UP (ref 42–55)
PCO2 BLDV: 48 MMHG — SIGNIFICANT CHANGE UP (ref 42–55)
PCO2 BLDV: 50 MMHG — SIGNIFICANT CHANGE UP (ref 42–55)
PH BLDV: 7.29 — LOW (ref 7.32–7.43)
PH BLDV: 7.31 — LOW (ref 7.32–7.43)
PH BLDV: 7.33 — SIGNIFICANT CHANGE UP (ref 7.32–7.43)
PH BLDV: 7.34 — SIGNIFICANT CHANGE UP (ref 7.32–7.43)
PH BLDV: 7.35 — SIGNIFICANT CHANGE UP (ref 7.32–7.43)
PH BLDV: 7.38 — SIGNIFICANT CHANGE UP (ref 7.32–7.43)
PH BLDV: 7.4 — SIGNIFICANT CHANGE UP (ref 7.32–7.43)
PH BLDV: 7.42 — SIGNIFICANT CHANGE UP (ref 7.32–7.43)
PHOSPHATE SERPL-MCNC: 4 MG/DL — SIGNIFICANT CHANGE UP (ref 2.5–4.5)
PLATELET # BLD AUTO: 158 K/UL — SIGNIFICANT CHANGE UP (ref 150–400)
PLATELET # BLD AUTO: 97 K/UL — LOW (ref 150–400)
PO2 BLDV: 50 MMHG — HIGH (ref 25–45)
PO2 BLDV: 53 MMHG — HIGH (ref 25–45)
PO2 BLDV: 58 MMHG — HIGH (ref 25–45)
PO2 BLDV: 60 MMHG — HIGH (ref 25–45)
PO2 BLDV: 62 MMHG — HIGH (ref 25–45)
PO2 BLDV: 65 MMHG — HIGH (ref 25–45)
PO2 BLDV: 79 MMHG — HIGH (ref 25–45)
PO2 BLDV: 91 MMHG — HIGH (ref 25–45)
POTASSIUM BLDV-SCNC: 4.1 MMOL/L — SIGNIFICANT CHANGE UP (ref 3.5–5.1)
POTASSIUM BLDV-SCNC: 4.5 MMOL/L — SIGNIFICANT CHANGE UP (ref 3.5–5.1)
POTASSIUM BLDV-SCNC: 4.7 MMOL/L — SIGNIFICANT CHANGE UP (ref 3.5–5.1)
POTASSIUM BLDV-SCNC: 5 MMOL/L — SIGNIFICANT CHANGE UP (ref 3.5–5.1)
POTASSIUM BLDV-SCNC: 5 MMOL/L — SIGNIFICANT CHANGE UP (ref 3.5–5.1)
POTASSIUM BLDV-SCNC: 5.4 MMOL/L — HIGH (ref 3.5–5.1)
POTASSIUM BLDV-SCNC: 6.2 MMOL/L — CRITICAL HIGH (ref 3.5–5.1)
POTASSIUM SERPL-MCNC: 5.1 MMOL/L — SIGNIFICANT CHANGE UP (ref 3.5–5.3)
POTASSIUM SERPL-SCNC: 5.1 MMOL/L — SIGNIFICANT CHANGE UP (ref 3.5–5.3)
PROT SERPL-MCNC: 4.5 G/DL — LOW (ref 6–8.3)
PROTHROM AB SERPL-ACNC: 16.2 SEC — HIGH (ref 10.5–13.4)
RAPIDTEG MAXIMUM AMPLITUDE: 49.7 MM (ref 52–70)
RBC # BLD: 4.29 M/UL — SIGNIFICANT CHANGE UP (ref 4.2–5.8)
RBC # BLD: 4.82 M/UL — SIGNIFICANT CHANGE UP (ref 4.2–5.8)
RBC # FLD: 11.2 % — SIGNIFICANT CHANGE UP (ref 10.3–14.5)
RBC # FLD: 11.2 % — SIGNIFICANT CHANGE UP (ref 10.3–14.5)
SAO2 % BLDV: 72.7 % — SIGNIFICANT CHANGE UP (ref 67–88)
SAO2 % BLDV: 77.6 % — SIGNIFICANT CHANGE UP (ref 67–88)
SAO2 % BLDV: 91 % — HIGH (ref 67–88)
SAO2 % BLDV: 91.8 % — HIGH (ref 67–88)
SAO2 % BLDV: 92 % — HIGH (ref 67–88)
SAO2 % BLDV: 94.4 % — HIGH (ref 67–88)
SAO2 % BLDV: 96.3 % — HIGH (ref 67–88)
SAO2 % BLDV: 98.7 % — HIGH (ref 67–88)
SODIUM SERPL-SCNC: 146 MMOL/L — HIGH (ref 135–145)
TEG FUNCTIONAL FIBRINOGEN: 15.8 MM — SIGNIFICANT CHANGE UP (ref 15–32)
TEG MAXIMUM AMPLITUDE: 54.6 MM — SIGNIFICANT CHANGE UP (ref 52–69)
TEG REACTION TIME: 6.8 MIN — SIGNIFICANT CHANGE UP (ref 4.6–9.1)
TROPONIN T, HIGH SENSITIVITY RESULT: 1301 NG/L — HIGH (ref 0–51)
WBC # BLD: 12.61 K/UL — HIGH (ref 3.8–10.5)
WBC # BLD: 4.72 K/UL — SIGNIFICANT CHANGE UP (ref 3.8–10.5)
WBC # FLD AUTO: 12.61 K/UL — HIGH (ref 3.8–10.5)
WBC # FLD AUTO: 4.72 K/UL — SIGNIFICANT CHANGE UP (ref 3.8–10.5)

## 2022-09-27 PROCEDURE — 33533 CABG ARTERIAL SINGLE: CPT | Mod: AS

## 2022-09-27 PROCEDURE — 33508 ENDOSCOPIC VEIN HARVEST: CPT | Mod: 59

## 2022-09-27 PROCEDURE — 71045 X-RAY EXAM CHEST 1 VIEW: CPT | Mod: 26

## 2022-09-27 PROCEDURE — 33518 CABG ARTERY-VEIN TWO: CPT | Mod: AS

## 2022-09-27 PROCEDURE — 93010 ELECTROCARDIOGRAM REPORT: CPT

## 2022-09-27 PROCEDURE — 33533 CABG ARTERIAL SINGLE: CPT

## 2022-09-27 PROCEDURE — 99291 CRITICAL CARE FIRST HOUR: CPT

## 2022-09-27 PROCEDURE — 33518 CABG ARTERY-VEIN TWO: CPT

## 2022-09-27 DEVICE — PACING WIRE ORANGE M-25 WINGED WIRE 37MM X 89MM: Type: IMPLANTABLE DEVICE | Status: FUNCTIONAL

## 2022-09-27 DEVICE — KIT CVC 1LUM 16GX20CM BLU FLX TIP: Type: IMPLANTABLE DEVICE | Status: FUNCTIONAL

## 2022-09-27 DEVICE — CANNULA AORTIC ROOT WITH VENT LINE 12G X 14CM FLANGED: Type: IMPLANTABLE DEVICE | Status: FUNCTIONAL

## 2022-09-27 DEVICE — CHEST DRAIN THORACIC ARGYLE PVC 28FR STRAIGHT: Type: IMPLANTABLE DEVICE | Status: FUNCTIONAL

## 2022-09-27 DEVICE — TISSEEL 10ML: Type: IMPLANTABLE DEVICE | Status: FUNCTIONAL

## 2022-09-27 DEVICE — CANNULA VENOUS 2 STAGE THIN FLEX 36/46FR X 1/2" WITH RETURN DIAL: Type: IMPLANTABLE DEVICE | Status: FUNCTIONAL

## 2022-09-27 DEVICE — KIT CVC 2LUM MAC 9FR CHG: Type: IMPLANTABLE DEVICE | Status: FUNCTIONAL

## 2022-09-27 DEVICE — KIT A-LINE 1LUM 20G X 12CM SAFE KIT: Type: IMPLANTABLE DEVICE | Status: FUNCTIONAL

## 2022-09-27 DEVICE — LIGATING CLIPS WECK HORIZON MEDIUM (BLUE) 24: Type: IMPLANTABLE DEVICE | Status: FUNCTIONAL

## 2022-09-27 DEVICE — CANNULA ARTERIAL OPTISITE 20FR X 3/8" VENTED: Type: IMPLANTABLE DEVICE | Status: FUNCTIONAL

## 2022-09-27 DEVICE — PACING WIRE WHITE M-22 LOOP 89MM: Type: IMPLANTABLE DEVICE | Status: FUNCTIONAL

## 2022-09-27 DEVICE — LIGATING CLIPS WECK HORIZON SMALL-WIDE (RED) 24: Type: IMPLANTABLE DEVICE | Status: FUNCTIONAL

## 2022-09-27 DEVICE — SHUNT FLO-THRU INTRALUMINAL 1MM X 18MM: Type: IMPLANTABLE DEVICE | Status: FUNCTIONAL

## 2022-09-27 DEVICE — CLIP APPLIER COVIDIEN SURGICLIP III 9" SM: Type: IMPLANTABLE DEVICE | Status: FUNCTIONAL

## 2022-09-27 RX ORDER — POTASSIUM CHLORIDE 20 MEQ
10 PACKET (EA) ORAL
Refills: 0 | Status: DISCONTINUED | OUTPATIENT
Start: 2022-09-27 | End: 2022-09-29

## 2022-09-27 RX ORDER — AMIODARONE HYDROCHLORIDE 400 MG/1
400 TABLET ORAL
Refills: 0 | Status: DISCONTINUED | OUTPATIENT
Start: 2022-09-27 | End: 2022-09-30

## 2022-09-27 RX ORDER — ASCORBIC ACID 60 MG
500 TABLET,CHEWABLE ORAL
Refills: 0 | Status: COMPLETED | OUTPATIENT
Start: 2022-09-27 | End: 2022-10-02

## 2022-09-27 RX ORDER — DEXMEDETOMIDINE HYDROCHLORIDE IN 0.9% SODIUM CHLORIDE 4 UG/ML
0.2 INJECTION INTRAVENOUS
Qty: 200 | Refills: 0 | Status: DISCONTINUED | OUTPATIENT
Start: 2022-09-27 | End: 2022-09-28

## 2022-09-27 RX ORDER — OXYCODONE HYDROCHLORIDE 5 MG/1
5 TABLET ORAL EVERY 4 HOURS
Refills: 0 | Status: DISCONTINUED | OUTPATIENT
Start: 2022-09-27 | End: 2022-10-04

## 2022-09-27 RX ORDER — SODIUM CHLORIDE 9 MG/ML
1000 INJECTION INTRAMUSCULAR; INTRAVENOUS; SUBCUTANEOUS
Refills: 0 | Status: DISCONTINUED | OUTPATIENT
Start: 2022-09-27 | End: 2022-10-04

## 2022-09-27 RX ORDER — ASPIRIN/CALCIUM CARB/MAGNESIUM 324 MG
300 TABLET ORAL ONCE
Refills: 0 | Status: COMPLETED | OUTPATIENT
Start: 2022-09-28 | End: 2022-09-28

## 2022-09-27 RX ORDER — CEFUROXIME AXETIL 250 MG
1500 TABLET ORAL EVERY 8 HOURS
Refills: 0 | Status: COMPLETED | OUTPATIENT
Start: 2022-09-28 | End: 2022-09-29

## 2022-09-27 RX ORDER — PANTOPRAZOLE SODIUM 20 MG/1
40 TABLET, DELAYED RELEASE ORAL DAILY
Refills: 0 | Status: DISCONTINUED | OUTPATIENT
Start: 2022-09-27 | End: 2022-09-28

## 2022-09-27 RX ORDER — INSULIN HUMAN 100 [IU]/ML
3 INJECTION, SOLUTION SUBCUTANEOUS
Qty: 100 | Refills: 0 | Status: DISCONTINUED | OUTPATIENT
Start: 2022-09-27 | End: 2022-09-27

## 2022-09-27 RX ORDER — POLYETHYLENE GLYCOL 3350 17 G/17G
17 POWDER, FOR SOLUTION ORAL DAILY
Refills: 0 | Status: DISCONTINUED | OUTPATIENT
Start: 2022-09-27 | End: 2022-10-04

## 2022-09-27 RX ORDER — ACETAMINOPHEN 500 MG
650 TABLET ORAL EVERY 6 HOURS
Refills: 0 | Status: DISCONTINUED | OUTPATIENT
Start: 2022-09-30 | End: 2022-09-30

## 2022-09-27 RX ORDER — ACETAMINOPHEN 500 MG
650 TABLET ORAL EVERY 6 HOURS
Refills: 0 | Status: DISCONTINUED | OUTPATIENT
Start: 2022-09-27 | End: 2022-09-30

## 2022-09-27 RX ORDER — ASPIRIN/CALCIUM CARB/MAGNESIUM 324 MG
300 TABLET ORAL ONCE
Refills: 0 | Status: DISCONTINUED | OUTPATIENT
Start: 2022-09-27 | End: 2022-09-28

## 2022-09-27 RX ORDER — GABAPENTIN 400 MG/1
100 CAPSULE ORAL EVERY 8 HOURS
Refills: 0 | Status: DISCONTINUED | OUTPATIENT
Start: 2022-09-27 | End: 2022-09-30

## 2022-09-27 RX ORDER — DEXTROSE 50 % IN WATER 50 %
25 SYRINGE (ML) INTRAVENOUS
Refills: 0 | Status: DISCONTINUED | OUTPATIENT
Start: 2022-09-27 | End: 2022-09-28

## 2022-09-27 RX ORDER — HYDROMORPHONE HYDROCHLORIDE 2 MG/ML
0.5 INJECTION INTRAMUSCULAR; INTRAVENOUS; SUBCUTANEOUS EVERY 6 HOURS
Refills: 0 | Status: DISCONTINUED | OUTPATIENT
Start: 2022-09-27 | End: 2022-09-29

## 2022-09-27 RX ORDER — CHLORHEXIDINE GLUCONATE 213 G/1000ML
5 SOLUTION TOPICAL
Refills: 0 | Status: DISCONTINUED | OUTPATIENT
Start: 2022-09-27 | End: 2022-09-28

## 2022-09-27 RX ORDER — DOBUTAMINE HCL 250MG/20ML
1.25 VIAL (ML) INTRAVENOUS
Qty: 500 | Refills: 0 | Status: DISCONTINUED | OUTPATIENT
Start: 2022-09-27 | End: 2022-09-30

## 2022-09-27 RX ORDER — CHLORHEXIDINE GLUCONATE 213 G/1000ML
1 SOLUTION TOPICAL DAILY
Refills: 0 | Status: DISCONTINUED | OUTPATIENT
Start: 2022-09-27 | End: 2022-10-04

## 2022-09-27 RX ORDER — MEPERIDINE HYDROCHLORIDE 50 MG/ML
25 INJECTION INTRAMUSCULAR; INTRAVENOUS; SUBCUTANEOUS ONCE
Refills: 0 | Status: DISCONTINUED | OUTPATIENT
Start: 2022-09-27 | End: 2022-09-28

## 2022-09-27 RX ORDER — DEXTROSE 50 % IN WATER 50 %
50 SYRINGE (ML) INTRAVENOUS
Refills: 0 | Status: DISCONTINUED | OUTPATIENT
Start: 2022-09-27 | End: 2022-09-28

## 2022-09-27 RX ORDER — INSULIN HUMAN 100 [IU]/ML
2 INJECTION, SOLUTION SUBCUTANEOUS
Qty: 100 | Refills: 0 | Status: DISCONTINUED | OUTPATIENT
Start: 2022-09-27 | End: 2022-09-29

## 2022-09-27 RX ORDER — OXYCODONE HYDROCHLORIDE 5 MG/1
10 TABLET ORAL EVERY 4 HOURS
Refills: 0 | Status: DISCONTINUED | OUTPATIENT
Start: 2022-09-27 | End: 2022-10-02

## 2022-09-27 RX ORDER — SENNA PLUS 8.6 MG/1
2 TABLET ORAL AT BEDTIME
Refills: 0 | Status: DISCONTINUED | OUTPATIENT
Start: 2022-09-28 | End: 2022-10-04

## 2022-09-27 RX ADMIN — Medication 1000 MILLIGRAM(S): at 12:05

## 2022-09-27 RX ADMIN — AMLODIPINE BESYLATE 2.5 MILLIGRAM(S): 2.5 TABLET ORAL at 06:39

## 2022-09-27 RX ADMIN — CHLORHEXIDINE GLUCONATE 1 APPLICATION(S): 213 SOLUTION TOPICAL at 11:00

## 2022-09-27 RX ADMIN — FINASTERIDE 5 MILLIGRAM(S): 5 TABLET, FILM COATED ORAL at 12:33

## 2022-09-27 RX ADMIN — GABAPENTIN 300 MILLIGRAM(S): 400 CAPSULE ORAL at 11:05

## 2022-09-27 RX ADMIN — HYDROMORPHONE HYDROCHLORIDE 0.5 MILLIGRAM(S): 2 INJECTION INTRAMUSCULAR; INTRAVENOUS; SUBCUTANEOUS at 23:35

## 2022-09-27 RX ADMIN — CHLORHEXIDINE GLUCONATE 30 MILLILITER(S): 213 SOLUTION TOPICAL at 11:06

## 2022-09-27 RX ADMIN — Medication 1000 MILLIGRAM(S): at 11:05

## 2022-09-27 RX ADMIN — Medication 81 MILLIGRAM(S): at 12:32

## 2022-09-27 RX ADMIN — HYDROMORPHONE HYDROCHLORIDE 0.5 MILLIGRAM(S): 2 INJECTION INTRAMUSCULAR; INTRAVENOUS; SUBCUTANEOUS at 22:57

## 2022-09-27 RX ADMIN — Medication 12.5 MILLIGRAM(S): at 06:39

## 2022-09-27 NOTE — BRIEF OPERATIVE NOTE - NSICDXBRIEFPROCEDURE_GEN_ALL_CORE_FT
PROCEDURES:  CABG, with OWEN 27-Sep-2022 21:28:47  Brianna Wilkerson   PROCEDURES:  CABG, with OWEN 27-Sep-2022 21:28:47 LIMA-LAD, SVG-PDA, SVG-OM-SVG Brianna Wilkerson

## 2022-09-27 NOTE — BRIEF OPERATIVE NOTE - COMMENTS
Cx Clamp Time:  I first assisted for the entirety of the case, including sternotomy, cardiopulmonary bypass, placement of coronary grafts and closing.

## 2022-09-27 NOTE — PRE-OP CHECKLIST - SELECT TESTS ORDERED
CBC/CMP/PT/PTT/COVID-19
BMP/CBC/CMP/PT/PTT/INR/Hepatic Function/Type and Screen/Urinalysis/EKG/CXR/Results in MD note/COVID-19

## 2022-09-27 NOTE — PROGRESS NOTE ADULT - SUBJECTIVE AND OBJECTIVE BOX
Patient seen and examined at the bedside.    Remained critically ill on continuous ICU monitoring.    OBJECTIVE:  Vital Signs Last 24 Hrs  T(C): 36.8 (27 Sep 2022 05:39), Max: 36.8 (27 Sep 2022 05:39)  T(F): 98.2 (27 Sep 2022 05:39), Max: 98.2 (27 Sep 2022 05:39)  HR: 72 (27 Sep 2022 21:26) (62 - 72)  BP: 138/76 (27 Sep 2022 05:39) (138/76 - 138/76)  BP(mean): --  RR: 18 (27 Sep 2022 05:39) (18 - 18)  SpO2: 99% (27 Sep 2022 21:26) (93% - 99%)    Parameters below as of 27 Sep 2022 05:39  Patient On (Oxygen Delivery Method): Ventilator     Physical Exam:   General: Intubated  Neurology: Sedated  Eyes: bilateral pupils equal and reactive   ENT/Neck: Neck supple, trachea midline, No JVD   Respiratory: Clear bilaterally   CV: S1S2, no murmurs        [x] Sternal dressing, [x] Mediastinal CT x3        [x] Sinus rhythm, [x] Temporary pacing - VVI @45  Abdominal: Soft, NT, ND +BS   Extremities: 1-2+ pedal edema noted, + peripheral pulses   Skin: No Rashes, Hematoma, Ecchymosis                           ============================I/O===========================   I&O's Detail    26 Sep 2022 07:01  -  27 Sep 2022 07:00  --------------------------------------------------------  IN:    Heparin: 300 mL    Oral Fluid: 680 mL  Total IN: 980 mL    OUT:    Voided (mL): 2700 mL  Total OUT: 2700 mL    Total NET: -1720 mL      27 Sep 2022 07:01  -  27 Sep 2022 22:10  --------------------------------------------------------  IN:  Total IN: 0 mL    OUT:    Oral Fluid: 0 mL  Total OUT: 0 mL    Total NET: 0 mL        ============================ LABS =========================                        13.5   12.61 )-----------( 97       ( 27 Sep 2022 21:37 )             39.0     09-26    141  |  108  |  18  ----------------------------<  137<H>  3.8   |  25  |  1.13    Ca    8.9      26 Sep 2022 03:09    TPro  5.8<L>  /  Alb  3.5  /  TBili  0.7  /  DBili  x   /  AST  24  /  ALT  22  /  AlkPhos  56  09-26    LIVER FUNCTIONS - ( 26 Sep 2022 03:09 )  Alb: 3.5 g/dL / Pro: 5.8 g/dL / ALK PHOS: 56 U/L / ALT: 22 U/L / AST: 24 U/L / GGT: x           PT/INR - ( 27 Sep 2022 21:37 )   PT: 16.2 sec;   INR: 1.39 ratio         PTT - ( 27 Sep 2022 21:37 )  PTT:27.4 sec  ABG - ( 27 Sep 2022 21:32 )  pH, Arterial: 7.39  pH, Blood: x     /  pCO2: 40    /  pO2: 154   / HCO3: 24    / Base Excess: -0.7  /  SaO2: 99.3          ======================Micro/Rad/Cardio=================  CXR: Reviewed  Echo: Reviewed  ======================================================  PAST MEDICAL & SURGICAL HISTORY:  History of BPH    No significant past surgical history    ======================================================  Assessment:  Patient is a 72y Male with PMH of BPH, HTN, presented to OSH with chest pain x1 day, transferred to [facility] for NSTEMI. History obtained from patient using  (PI #[id number]). Patient was woken up last night around midnight with sudden-onset left-sided chest pain. The pain did not radiate anywhere else, was not associated with SOB or diaphoresis. He did have some mild dizziness. At OSH he was loaded with aspirin and plavix and received nitroglycerin x2, chest pain is now resolved. Patient denies fever, chills, abdominal pain, nausea, vomiting, changes in bowel habits, or urinary symptoms. He has no personal or family history of heart problems, never smoked cigarettes.     CAD s/p CABG 09/27/2022  Stress hyperglycemia   Lactate acidosis  Thrombocytopenia     ======================================================  Plan:   ***Neuro***  [x] Sedated with [x] Precedex   Post operative neuro assessment     ***Cardiovascular***  Invasive hemodynamic monitoring, assess perfusion indices   SR / CVP 9/ MAP 81/  Hct 39.0%/ Lactate 2.8  [x] Dobutamine - 2.5mcgs   Reassessment of hemodynamics post resuscitation   Monitor chest tube outputs   Amiodarone for afib prophylaxis   Serial EKG and cardiac enzymes     ***Pulmonary***  Post op vent management   Titration of FiO2 and PEEP, follow SpO2, CXR, blood gasses     Mode: AC/ CMV (Assist Control/ Continuous Mandatory Ventilation)  RR (machine): 12  TV (machine): 520  FiO2: 100  PEEP: 5  ITime: 1  MAP: 9  PIP: 17    ***GI***  [x] NPO  [x] Protonix   Bowel regimen with Miralax     ***Renal***  Continue to monitor I/Os, BUN/Creatinine.   Replete lytes PRN  Sandie present     ***ID***  No active antibiotic coverage.    ***Endocrine***  [x] Stress Hyperglycemia : HbA1c 5.3%                - [x] Insulin gtt              - Need tight glycemic control to prevent wound infection.      Patient requires continuous monitoring with:  bedside rhythm monitoring, arterial line, pulse oximetry, ventilator management and monitoring; intermittent blood gas analysis.  Care plan discussed with ICU care team.  patient remain critical; required more than usual post op care; I have spent 40 minutes providing non routine post op care, revaluated multiple times during the day .     Care Plan discussed with the ICU care team. Patient remained critical, at risk for life threatening decompensation.     By signing my name below, I, Trina Harvey, attest that this documentation has been prepared under the direction and in the presence of Bdudy Santos MD.  Electronically signed: Shayna Montilla, 09-27-22 @ 22:10    I, Tom Goodwin, personally performed the services described in this documentation. all medical record entries made by the scribe were at my direction and in my presence. I have reviewed the chart and agree that the record reflects my personal performance and is accurate and complete  Electronically signed: Buddy Santos MD. Patient seen and examined at the bedside.    Remained critically ill on continuous ICU monitoring.    OBJECTIVE:  Vital Signs Last 24 Hrs  T(C): 36.8 (27 Sep 2022 05:39), Max: 36.8 (27 Sep 2022 05:39)  T(F): 98.2 (27 Sep 2022 05:39), Max: 98.2 (27 Sep 2022 05:39)  HR: 72 (27 Sep 2022 21:26) (62 - 72)  BP: 138/76 (27 Sep 2022 05:39) (138/76 - 138/76)  BP(mean): --  RR: 18 (27 Sep 2022 05:39) (18 - 18)  SpO2: 99% (27 Sep 2022 21:26) (93% - 99%)    Parameters below as of 27 Sep 2022 05:39  Patient On (Oxygen Delivery Method): Ventilator     Physical Exam:   General: Intubated  Neurology: Sedated  Eyes: bilateral pupils equal and reactive   ENT/Neck: Neck supple, trachea midline, No JVD   Respiratory: Clear bilaterally   CV: S1S2, no murmurs        [x] Sternal dressing, [x] Mediastinal CT x3        [x] Sinus rhythm, [x] Temporary pacing - VVI @45  Abdominal: Soft, NT, ND +BS   Extremities: 1-2+ pedal edema noted, + peripheral pulses   Skin: No Rashes, Hematoma, Ecchymosis                           ============================I/O===========================   I&O's Detail    26 Sep 2022 07:01  -  27 Sep 2022 07:00  --------------------------------------------------------  IN:    Heparin: 300 mL    Oral Fluid: 680 mL  Total IN: 980 mL    OUT:    Voided (mL): 2700 mL  Total OUT: 2700 mL    Total NET: -1720 mL      27 Sep 2022 07:01  -  27 Sep 2022 22:10  --------------------------------------------------------  IN:  Total IN: 0 mL    OUT:    Oral Fluid: 0 mL  Total OUT: 0 mL    Total NET: 0 mL        ============================ LABS =========================                        13.5   12.61 )-----------( 97       ( 27 Sep 2022 21:37 )             39.0     09-26    141  |  108  |  18  ----------------------------<  137<H>  3.8   |  25  |  1.13    Ca    8.9      26 Sep 2022 03:09    TPro  5.8<L>  /  Alb  3.5  /  TBili  0.7  /  DBili  x   /  AST  24  /  ALT  22  /  AlkPhos  56  09-26    LIVER FUNCTIONS - ( 26 Sep 2022 03:09 )  Alb: 3.5 g/dL / Pro: 5.8 g/dL / ALK PHOS: 56 U/L / ALT: 22 U/L / AST: 24 U/L / GGT: x           PT/INR - ( 27 Sep 2022 21:37 )   PT: 16.2 sec;   INR: 1.39 ratio         PTT - ( 27 Sep 2022 21:37 )  PTT:27.4 sec  ABG - ( 27 Sep 2022 21:32 )  pH, Arterial: 7.39  pH, Blood: x     /  pCO2: 40    /  pO2: 154   / HCO3: 24    / Base Excess: -0.7  /  SaO2: 99.3          ======================Micro/Rad/Cardio=================  CXR: Reviewed  Echo: Reviewed  ======================================================  PAST MEDICAL & SURGICAL HISTORY:  History of BPH    No significant past surgical history    ======================================================  Assessment:  Patient is a 72y Male with PMH of BPH, HTN, presented to OSH with chest pain x1 day, transferred to [facility] for NSTEMI. History obtained from patient using  (PI #[id number]). Patient was woken up last night around midnight with sudden-onset left-sided chest pain. The pain did not radiate anywhere else, was not associated with SOB or diaphoresis. He did have some mild dizziness. At OSH he was loaded with aspirin and plavix and received nitroglycerin x2, chest pain is now resolved. Patient denies fever, chills, abdominal pain, nausea, vomiting, changes in bowel habits, or urinary symptoms. He has no personal or family history of heart problems, never smoked cigarettes.     CAD s/p CABG 09/27/2022  acute systolic chf  hemodynamic instability  Stress hyperglycemia   Lactate acidosis  Thrombocytopenia     ======================================================  Plan:   ***Neuro***  [x] Sedated with [x] Precedex   Post operative neuro assessment     ***Cardiovascular***  Invasive hemodynamic monitoring, assess perfusion indices   SR / CVP 9/ MAP 81/  Hct 39.0%/ Lactate 2.8  [x] Dobutamine - 2.5mcgs   Reassessment of hemodynamics post resuscitation   Monitor chest tube outputs   Amiodarone for afib prophylaxis   Serial EKG and cardiac enzymes     ***Pulmonary***  Post op vent management   Titration of FiO2 and PEEP, follow SpO2, CXR, blood gasses     Mode: AC/ CMV (Assist Control/ Continuous Mandatory Ventilation)  RR (machine): 12  TV (machine): 520  FiO2: 100  PEEP: 5  wean to extubate\    ***GI***  [x] NPO  [x] Protonix   Bowel regimen with Miralax     ***Renal***  Continue to monitor I/Os, BUN/Creatinine.   Replete lytes PRN  Sandie present     ***ID***  No active antibiotic coverage.    ***Endocrine***  [x] Stress Hyperglycemia : HbA1c 5.3%                - [x] Insulin gtt              - Need tight glycemic control to prevent wound infection.      Patient requires continuous monitoring with:  bedside rhythm monitoring, arterial line, pulse oximetry, ventilator management and monitoring; intermittent blood gas analysis.  Care plan discussed with ICU care team.  patient remain critical; required more than usual post op care; I have spent 40 minutes providing non routine post op care, revaluated multiple times during the day .     Care Plan discussed with the ICU care team. Patient remained critical, at risk for life threatening decompensation.     By signing my name below, I, Trina Harvey, attest that this documentation has been prepared under the direction and in the presence of Buddy Santos MD.  Electronically signed: Shayna Montilla, 09-27-22 @ 22:10    I, Tom Goodwin, personally performed the services described in this documentation. all medical record entries made by the scribe were at my direction and in my presence. I have reviewed the chart and agree that the record reflects my personal performance and is accurate and complete  Electronically signed: Buddy Santos MD.

## 2022-09-28 LAB
ALBUMIN SERPL ELPH-MCNC: 3.2 G/DL — LOW (ref 3.3–5)
ALP SERPL-CCNC: 33 U/L — LOW (ref 40–120)
ALT FLD-CCNC: 32 U/L — SIGNIFICANT CHANGE UP (ref 10–45)
ANION GAP SERPL CALC-SCNC: 12 MMOL/L — SIGNIFICANT CHANGE UP (ref 5–17)
APTT BLD: 29.3 SEC — SIGNIFICANT CHANGE UP (ref 27.5–35.5)
AST SERPL-CCNC: 53 U/L — HIGH (ref 10–40)
BASE EXCESS BLDV CALC-SCNC: -2 MMOL/L — SIGNIFICANT CHANGE UP (ref -2–3)
BASE EXCESS BLDV CALC-SCNC: 0.9 MMOL/L — SIGNIFICANT CHANGE UP (ref -2–3)
BASE EXCESS BLDV CALC-SCNC: 3 MMOL/L — SIGNIFICANT CHANGE UP (ref -2–3)
BASOPHILS # BLD AUTO: 0.02 K/UL — SIGNIFICANT CHANGE UP (ref 0–0.2)
BASOPHILS NFR BLD AUTO: 0.2 % — SIGNIFICANT CHANGE UP (ref 0–2)
BILIRUB SERPL-MCNC: 3 MG/DL — HIGH (ref 0.2–1.2)
BUN SERPL-MCNC: 12 MG/DL — SIGNIFICANT CHANGE UP (ref 7–23)
CALCIUM SERPL-MCNC: 8.6 MG/DL — SIGNIFICANT CHANGE UP (ref 8.4–10.5)
CHLORIDE SERPL-SCNC: 111 MMOL/L — HIGH (ref 96–108)
CO2 BLDV-SCNC: 26 MMOL/L — SIGNIFICANT CHANGE UP (ref 22–26)
CO2 BLDV-SCNC: 29 MMOL/L — HIGH (ref 22–26)
CO2 BLDV-SCNC: 30 MMOL/L — HIGH (ref 22–26)
CO2 SERPL-SCNC: 22 MMOL/L — SIGNIFICANT CHANGE UP (ref 22–31)
CREAT SERPL-MCNC: 0.93 MG/DL — SIGNIFICANT CHANGE UP (ref 0.5–1.3)
EGFR: 86 ML/MIN/1.73M2 — SIGNIFICANT CHANGE UP
EOSINOPHIL # BLD AUTO: 0 K/UL — SIGNIFICANT CHANGE UP (ref 0–0.5)
EOSINOPHIL NFR BLD AUTO: 0 % — SIGNIFICANT CHANGE UP (ref 0–6)
FIBRINOGEN PPP-MCNC: 352 MG/DL — SIGNIFICANT CHANGE UP (ref 330–520)
GAS PNL BLDA: SIGNIFICANT CHANGE UP
GAS PNL BLDV: SIGNIFICANT CHANGE UP
GLUCOSE BLDC GLUCOMTR-MCNC: 101 MG/DL — HIGH (ref 70–99)
GLUCOSE BLDC GLUCOMTR-MCNC: 104 MG/DL — HIGH (ref 70–99)
GLUCOSE BLDC GLUCOMTR-MCNC: 106 MG/DL — HIGH (ref 70–99)
GLUCOSE BLDC GLUCOMTR-MCNC: 109 MG/DL — HIGH (ref 70–99)
GLUCOSE BLDC GLUCOMTR-MCNC: 110 MG/DL — HIGH (ref 70–99)
GLUCOSE BLDC GLUCOMTR-MCNC: 111 MG/DL — HIGH (ref 70–99)
GLUCOSE BLDC GLUCOMTR-MCNC: 113 MG/DL — HIGH (ref 70–99)
GLUCOSE BLDC GLUCOMTR-MCNC: 114 MG/DL — HIGH (ref 70–99)
GLUCOSE BLDC GLUCOMTR-MCNC: 128 MG/DL — HIGH (ref 70–99)
GLUCOSE BLDC GLUCOMTR-MCNC: 135 MG/DL — HIGH (ref 70–99)
GLUCOSE BLDC GLUCOMTR-MCNC: 135 MG/DL — HIGH (ref 70–99)
GLUCOSE BLDC GLUCOMTR-MCNC: 137 MG/DL — HIGH (ref 70–99)
GLUCOSE BLDC GLUCOMTR-MCNC: 138 MG/DL — HIGH (ref 70–99)
GLUCOSE BLDC GLUCOMTR-MCNC: 143 MG/DL — HIGH (ref 70–99)
GLUCOSE BLDC GLUCOMTR-MCNC: 144 MG/DL — HIGH (ref 70–99)
GLUCOSE BLDC GLUCOMTR-MCNC: 149 MG/DL — HIGH (ref 70–99)
GLUCOSE BLDC GLUCOMTR-MCNC: 155 MG/DL — HIGH (ref 70–99)
GLUCOSE BLDC GLUCOMTR-MCNC: 155 MG/DL — HIGH (ref 70–99)
GLUCOSE BLDC GLUCOMTR-MCNC: 156 MG/DL — HIGH (ref 70–99)
GLUCOSE BLDC GLUCOMTR-MCNC: 159 MG/DL — HIGH (ref 70–99)
GLUCOSE BLDC GLUCOMTR-MCNC: 163 MG/DL — HIGH (ref 70–99)
GLUCOSE BLDC GLUCOMTR-MCNC: 165 MG/DL — HIGH (ref 70–99)
GLUCOSE BLDC GLUCOMTR-MCNC: 178 MG/DL — HIGH (ref 70–99)
GLUCOSE SERPL-MCNC: 186 MG/DL — HIGH (ref 70–99)
HCO3 BLDV-SCNC: 24 MMOL/L — SIGNIFICANT CHANGE UP (ref 22–29)
HCO3 BLDV-SCNC: 28 MMOL/L — SIGNIFICANT CHANGE UP (ref 22–29)
HCO3 BLDV-SCNC: 29 MMOL/L — SIGNIFICANT CHANGE UP (ref 22–29)
HCT VFR BLD CALC: 35.3 % — LOW (ref 39–50)
HGB BLD-MCNC: 12.1 G/DL — LOW (ref 13–17)
HOROWITZ INDEX BLDV+IHG-RTO: 40 — SIGNIFICANT CHANGE UP
HOROWITZ INDEX BLDV+IHG-RTO: 50 — SIGNIFICANT CHANGE UP
HOROWITZ INDEX BLDV+IHG-RTO: 50 — SIGNIFICANT CHANGE UP
IMM GRANULOCYTES NFR BLD AUTO: 0.6 % — SIGNIFICANT CHANGE UP (ref 0–0.9)
INR BLD: 1.3 RATIO — HIGH (ref 0.88–1.16)
LYMPHOCYTES # BLD AUTO: 0.53 K/UL — LOW (ref 1–3.3)
LYMPHOCYTES # BLD AUTO: 5.7 % — LOW (ref 13–44)
MAGNESIUM SERPL-MCNC: 2.4 MG/DL — SIGNIFICANT CHANGE UP (ref 1.6–2.6)
MCHC RBC-ENTMCNC: 31.5 PG — SIGNIFICANT CHANGE UP (ref 27–34)
MCHC RBC-ENTMCNC: 34.3 GM/DL — SIGNIFICANT CHANGE UP (ref 32–36)
MCV RBC AUTO: 91.9 FL — SIGNIFICANT CHANGE UP (ref 80–100)
MONOCYTES # BLD AUTO: 0.58 K/UL — SIGNIFICANT CHANGE UP (ref 0–0.9)
MONOCYTES NFR BLD AUTO: 6.3 % — SIGNIFICANT CHANGE UP (ref 2–14)
NEUTROPHILS # BLD AUTO: 8.09 K/UL — HIGH (ref 1.8–7.4)
NEUTROPHILS NFR BLD AUTO: 87.2 % — HIGH (ref 43–77)
NRBC # BLD: 0 /100 WBCS — SIGNIFICANT CHANGE UP (ref 0–0)
PCO2 BLDV: 47 MMHG — SIGNIFICANT CHANGE UP (ref 42–55)
PCO2 BLDV: 49 MMHG — SIGNIFICANT CHANGE UP (ref 42–55)
PCO2 BLDV: 51 MMHG — SIGNIFICANT CHANGE UP (ref 42–55)
PH BLDV: 7.32 — SIGNIFICANT CHANGE UP (ref 7.32–7.43)
PH BLDV: 7.34 — SIGNIFICANT CHANGE UP (ref 7.32–7.43)
PH BLDV: 7.38 — SIGNIFICANT CHANGE UP (ref 7.32–7.43)
PHOSPHATE SERPL-MCNC: 2.9 MG/DL — SIGNIFICANT CHANGE UP (ref 2.5–4.5)
PLATELET # BLD AUTO: 99 K/UL — LOW (ref 150–400)
PO2 BLDV: 35 MMHG — SIGNIFICANT CHANGE UP (ref 25–45)
PO2 BLDV: 39 MMHG — SIGNIFICANT CHANGE UP (ref 25–45)
PO2 BLDV: 42 MMHG — SIGNIFICANT CHANGE UP (ref 25–45)
POTASSIUM SERPL-MCNC: 4.7 MMOL/L — SIGNIFICANT CHANGE UP (ref 3.5–5.3)
POTASSIUM SERPL-SCNC: 4.7 MMOL/L — SIGNIFICANT CHANGE UP (ref 3.5–5.3)
PROT SERPL-MCNC: 4.6 G/DL — LOW (ref 6–8.3)
PROTHROM AB SERPL-ACNC: 15.1 SEC — HIGH (ref 10.5–13.4)
RBC # BLD: 3.84 M/UL — LOW (ref 4.2–5.8)
RBC # FLD: 11.4 % — SIGNIFICANT CHANGE UP (ref 10.3–14.5)
SAO2 % BLDV: 56.4 % — LOW (ref 67–88)
SAO2 % BLDV: 68.7 % — SIGNIFICANT CHANGE UP (ref 67–88)
SAO2 % BLDV: 69.1 % — SIGNIFICANT CHANGE UP (ref 67–88)
SODIUM SERPL-SCNC: 145 MMOL/L — SIGNIFICANT CHANGE UP (ref 135–145)
WBC # BLD: 9.28 K/UL — SIGNIFICANT CHANGE UP (ref 3.8–10.5)
WBC # FLD AUTO: 9.28 K/UL — SIGNIFICANT CHANGE UP (ref 3.8–10.5)

## 2022-09-28 PROCEDURE — 99292 CRITICAL CARE ADDL 30 MIN: CPT | Mod: 24

## 2022-09-28 PROCEDURE — 93010 ELECTROCARDIOGRAM REPORT: CPT

## 2022-09-28 PROCEDURE — 71045 X-RAY EXAM CHEST 1 VIEW: CPT | Mod: 26

## 2022-09-28 PROCEDURE — 99291 CRITICAL CARE FIRST HOUR: CPT

## 2022-09-28 RX ORDER — NOREPINEPHRINE BITARTRATE/D5W 8 MG/250ML
0.05 PLASTIC BAG, INJECTION (ML) INTRAVENOUS
Qty: 8 | Refills: 0 | Status: DISCONTINUED | OUTPATIENT
Start: 2022-09-28 | End: 2022-09-28

## 2022-09-28 RX ORDER — ALBUMIN HUMAN 25 %
250 VIAL (ML) INTRAVENOUS ONCE
Refills: 0 | Status: COMPLETED | OUTPATIENT
Start: 2022-09-28 | End: 2022-09-28

## 2022-09-28 RX ORDER — SODIUM CHLORIDE 9 MG/ML
1000 INJECTION, SOLUTION INTRAVENOUS ONCE
Refills: 0 | Status: COMPLETED | OUTPATIENT
Start: 2022-09-28 | End: 2022-09-28

## 2022-09-28 RX ORDER — ATORVASTATIN CALCIUM 80 MG/1
40 TABLET, FILM COATED ORAL AT BEDTIME
Refills: 0 | Status: DISCONTINUED | OUTPATIENT
Start: 2022-09-28 | End: 2022-10-01

## 2022-09-28 RX ORDER — PANTOPRAZOLE SODIUM 20 MG/1
40 TABLET, DELAYED RELEASE ORAL
Refills: 0 | Status: DISCONTINUED | OUTPATIENT
Start: 2022-09-28 | End: 2022-10-04

## 2022-09-28 RX ORDER — ALBUMIN HUMAN 25 %
250 VIAL (ML) INTRAVENOUS
Refills: 0 | Status: COMPLETED | OUTPATIENT
Start: 2022-09-28 | End: 2022-09-28

## 2022-09-28 RX ORDER — CALCIUM GLUCONATE 100 MG/ML
2 VIAL (ML) INTRAVENOUS ONCE
Refills: 0 | Status: COMPLETED | OUTPATIENT
Start: 2022-09-28 | End: 2022-09-28

## 2022-09-28 RX ORDER — NICARDIPINE HYDROCHLORIDE 30 MG/1
5 CAPSULE, EXTENDED RELEASE ORAL
Qty: 40 | Refills: 0 | Status: DISCONTINUED | OUTPATIENT
Start: 2022-09-28 | End: 2022-09-29

## 2022-09-28 RX ORDER — HYDROMORPHONE HYDROCHLORIDE 2 MG/ML
0.5 INJECTION INTRAMUSCULAR; INTRAVENOUS; SUBCUTANEOUS ONCE
Refills: 0 | Status: DISCONTINUED | OUTPATIENT
Start: 2022-09-28 | End: 2022-09-28

## 2022-09-28 RX ORDER — FUROSEMIDE 40 MG
10 TABLET ORAL ONCE
Refills: 0 | Status: COMPLETED | OUTPATIENT
Start: 2022-09-28 | End: 2022-09-28

## 2022-09-28 RX ORDER — MILRINONE LACTATE 1 MG/ML
0.1 INJECTION, SOLUTION INTRAVENOUS
Qty: 20 | Refills: 0 | Status: DISCONTINUED | OUTPATIENT
Start: 2022-09-28 | End: 2022-09-29

## 2022-09-28 RX ORDER — ONDANSETRON 8 MG/1
4 TABLET, FILM COATED ORAL ONCE
Refills: 0 | Status: COMPLETED | OUTPATIENT
Start: 2022-09-28 | End: 2022-09-28

## 2022-09-28 RX ORDER — ASPIRIN/CALCIUM CARB/MAGNESIUM 324 MG
81 TABLET ORAL DAILY
Refills: 0 | Status: DISCONTINUED | OUTPATIENT
Start: 2022-09-28 | End: 2022-10-04

## 2022-09-28 RX ORDER — ENOXAPARIN SODIUM 100 MG/ML
40 INJECTION SUBCUTANEOUS EVERY 24 HOURS
Refills: 0 | Status: DISCONTINUED | OUTPATIENT
Start: 2022-09-28 | End: 2022-10-04

## 2022-09-28 RX ORDER — SODIUM CHLORIDE 9 MG/ML
250 INJECTION INTRAMUSCULAR; INTRAVENOUS; SUBCUTANEOUS ONCE
Refills: 0 | Status: COMPLETED | OUTPATIENT
Start: 2022-09-28 | End: 2022-09-28

## 2022-09-28 RX ADMIN — AMIODARONE HYDROCHLORIDE 400 MILLIGRAM(S): 400 TABLET ORAL at 17:29

## 2022-09-28 RX ADMIN — Medication 100 MILLIGRAM(S): at 01:35

## 2022-09-28 RX ADMIN — HYDROMORPHONE HYDROCHLORIDE 0.5 MILLIGRAM(S): 2 INJECTION INTRAMUSCULAR; INTRAVENOUS; SUBCUTANEOUS at 13:45

## 2022-09-28 RX ADMIN — PANTOPRAZOLE SODIUM 40 MILLIGRAM(S): 20 TABLET, DELAYED RELEASE ORAL at 12:50

## 2022-09-28 RX ADMIN — ENOXAPARIN SODIUM 40 MILLIGRAM(S): 100 INJECTION SUBCUTANEOUS at 15:45

## 2022-09-28 RX ADMIN — HYDROMORPHONE HYDROCHLORIDE 0.5 MILLIGRAM(S): 2 INJECTION INTRAMUSCULAR; INTRAVENOUS; SUBCUTANEOUS at 08:30

## 2022-09-28 RX ADMIN — AMIODARONE HYDROCHLORIDE 400 MILLIGRAM(S): 400 TABLET ORAL at 06:03

## 2022-09-28 RX ADMIN — Medication 125 MILLILITER(S): at 00:22

## 2022-09-28 RX ADMIN — HYDROMORPHONE HYDROCHLORIDE 0.5 MILLIGRAM(S): 2 INJECTION INTRAMUSCULAR; INTRAVENOUS; SUBCUTANEOUS at 02:30

## 2022-09-28 RX ADMIN — Medication 650 MILLIGRAM(S): at 17:29

## 2022-09-28 RX ADMIN — SODIUM CHLORIDE 1000 MILLILITER(S): 9 INJECTION, SOLUTION INTRAVENOUS at 00:23

## 2022-09-28 RX ADMIN — MILRINONE LACTATE 2.51 MICROGRAM(S)/KG/MIN: 1 INJECTION, SOLUTION INTRAVENOUS at 10:00

## 2022-09-28 RX ADMIN — ATORVASTATIN CALCIUM 40 MILLIGRAM(S): 80 TABLET, FILM COATED ORAL at 21:28

## 2022-09-28 RX ADMIN — Medication 6.26 MICROGRAM(S)/KG/MIN: at 10:00

## 2022-09-28 RX ADMIN — SODIUM CHLORIDE 1000 MILLILITER(S): 9 INJECTION INTRAMUSCULAR; INTRAVENOUS; SUBCUTANEOUS at 00:23

## 2022-09-28 RX ADMIN — Medication 300 MILLIGRAM(S): at 03:27

## 2022-09-28 RX ADMIN — Medication 650 MILLIGRAM(S): at 12:51

## 2022-09-28 RX ADMIN — GABAPENTIN 100 MILLIGRAM(S): 400 CAPSULE ORAL at 13:09

## 2022-09-28 RX ADMIN — HYDROMORPHONE HYDROCHLORIDE 0.5 MILLIGRAM(S): 2 INJECTION INTRAMUSCULAR; INTRAVENOUS; SUBCUTANEOUS at 13:10

## 2022-09-28 RX ADMIN — INSULIN HUMAN 2 UNIT(S)/HR: 100 INJECTION, SOLUTION SUBCUTANEOUS at 10:00

## 2022-09-28 RX ADMIN — Medication 650 MILLIGRAM(S): at 18:14

## 2022-09-28 RX ADMIN — Medication 200 GRAM(S): at 00:23

## 2022-09-28 RX ADMIN — Medication 100 MILLIGRAM(S): at 17:28

## 2022-09-28 RX ADMIN — Medication 650 MILLIGRAM(S): at 06:02

## 2022-09-28 RX ADMIN — Medication 650 MILLIGRAM(S): at 13:30

## 2022-09-28 RX ADMIN — GABAPENTIN 100 MILLIGRAM(S): 400 CAPSULE ORAL at 21:28

## 2022-09-28 RX ADMIN — Medication 125 MILLILITER(S): at 04:07

## 2022-09-28 RX ADMIN — Medication 10 MILLIGRAM(S): at 17:15

## 2022-09-28 RX ADMIN — HYDROMORPHONE HYDROCHLORIDE 0.5 MILLIGRAM(S): 2 INJECTION INTRAMUSCULAR; INTRAVENOUS; SUBCUTANEOUS at 18:36

## 2022-09-28 RX ADMIN — POLYETHYLENE GLYCOL 3350 17 GRAM(S): 17 POWDER, FOR SOLUTION ORAL at 12:50

## 2022-09-28 RX ADMIN — CHLORHEXIDINE GLUCONATE 1 APPLICATION(S): 213 SOLUTION TOPICAL at 20:25

## 2022-09-28 RX ADMIN — HYDROMORPHONE HYDROCHLORIDE 0.5 MILLIGRAM(S): 2 INJECTION INTRAMUSCULAR; INTRAVENOUS; SUBCUTANEOUS at 05:49

## 2022-09-28 RX ADMIN — HYDROMORPHONE HYDROCHLORIDE 0.5 MILLIGRAM(S): 2 INJECTION INTRAMUSCULAR; INTRAVENOUS; SUBCUTANEOUS at 02:15

## 2022-09-28 RX ADMIN — Medication 125 MILLILITER(S): at 12:51

## 2022-09-28 RX ADMIN — GABAPENTIN 100 MILLIGRAM(S): 400 CAPSULE ORAL at 06:03

## 2022-09-28 RX ADMIN — HYDROMORPHONE HYDROCHLORIDE 0.5 MILLIGRAM(S): 2 INJECTION INTRAMUSCULAR; INTRAVENOUS; SUBCUTANEOUS at 18:06

## 2022-09-28 RX ADMIN — HYDROMORPHONE HYDROCHLORIDE 0.5 MILLIGRAM(S): 2 INJECTION INTRAMUSCULAR; INTRAVENOUS; SUBCUTANEOUS at 07:59

## 2022-09-28 RX ADMIN — Medication 650 MILLIGRAM(S): at 06:03

## 2022-09-28 RX ADMIN — SENNA PLUS 2 TABLET(S): 8.6 TABLET ORAL at 21:29

## 2022-09-28 RX ADMIN — Medication 125 MILLILITER(S): at 04:05

## 2022-09-28 RX ADMIN — Medication 125 MILLILITER(S): at 03:50

## 2022-09-28 RX ADMIN — Medication 500 MILLIGRAM(S): at 06:03

## 2022-09-28 RX ADMIN — Medication 100 MILLIGRAM(S): at 09:57

## 2022-09-28 RX ADMIN — Medication 500 MILLIGRAM(S): at 17:28

## 2022-09-28 RX ADMIN — HYDROMORPHONE HYDROCHLORIDE 0.5 MILLIGRAM(S): 2 INJECTION INTRAMUSCULAR; INTRAVENOUS; SUBCUTANEOUS at 06:03

## 2022-09-28 NOTE — DIETITIAN INITIAL EVALUATION ADULT - REASON INDICATOR FOR ASSESSMENT
Length of stay  Source: EMR, patient, RN   Nut ID#[id number] used to facilitate interview  Interview limited as patient lethargic at this time

## 2022-09-28 NOTE — DIETITIAN INITIAL EVALUATION ADULT - PERTINENT MEDS FT
This is an extremely nice 30-year-old gentleman who is here for routine follow-up.  I will request blood work and notify him when the results are available.  I would like him to call if there is a problem.  
MEDICATIONS  (STANDING):  acetaminophen     Tablet .. 650 milliGRAM(s) Oral every 6 hours  albumin human  5% IVPB 250 milliLiter(s) IV Intermittent once  aMIOdarone    Tablet 400 milliGRAM(s) Oral two times a day  ascorbic acid 500 milliGRAM(s) Oral two times a day  aspirin Suppository 300 milliGRAM(s) Rectal once  cefuroxime  IVPB 1500 milliGRAM(s) IV Intermittent every 8 hours  chlorhexidine 2% Cloths 1 Application(s) Topical daily  DOBUTamine Infusion 2.5 MICROgram(s)/kG/Min (6.26 mL/Hr) IV Continuous <Continuous>  gabapentin 100 milliGRAM(s) Oral every 8 hours  insulin regular Infusion 2 Unit(s)/Hr (2 mL/Hr) IV Continuous <Continuous>  milrinone Infusion 0.1 MICROgram(s)/kG/Min (2.51 mL/Hr) IV Continuous <Continuous>  niCARdipine Infusion 5 mG/Hr (25 mL/Hr) IV Continuous <Continuous>  ondansetron Injectable 4 milliGRAM(s) IV Push once  pantoprazole  Injectable 40 milliGRAM(s) IV Push daily  polyethylene glycol 3350 17 Gram(s) Oral daily  potassium chloride  10 mEq/50 mL IVPB 10 milliEquivalent(s) IV Intermittent every 1 hour  potassium chloride  10 mEq/50 mL IVPB 10 milliEquivalent(s) IV Intermittent every 1 hour  potassium chloride  10 mEq/50 mL IVPB 10 milliEquivalent(s) IV Intermittent every 1 hour  senna 2 Tablet(s) Oral at bedtime  sodium chloride 0.9%. 1000 milliLiter(s) (10 mL/Hr) IV Continuous <Continuous>    MEDICATIONS  (PRN):  HYDROmorphone  Injectable 0.5 milliGRAM(s) IV Push every 6 hours PRN Breakthrough Pain  oxyCODONE    IR 5 milliGRAM(s) Oral every 4 hours PRN Moderate Pain (4 - 6)  oxyCODONE    IR 10 milliGRAM(s) Oral every 4 hours PRN Severe Pain (7 - 10)  
Left:

## 2022-09-28 NOTE — DIETITIAN INITIAL EVALUATION ADULT - RD TO REMAIN AVAILABLE
"  [id number]     yes

## 2022-09-28 NOTE — DIETITIAN INITIAL EVALUATION ADULT - PATIENT MEETS CRITERIA FOR MALNUTRITION
Care Management Follow Up    Length of Stay (days): 2    Expected Discharge Date: 08/11/2022     Concerns to be Addressed:     Med mgmt, goals of care discussions  Patient plan of care discussed at interdisciplinary rounds: Yes    Anticipated Discharge Disposition: Assisted Living, Hospice     Anticipated Discharge Services:    Anticipated Discharge DME:      Referrals Placed by CM/SW:    Private pay costs discussed: Not applicable    Additional Information:      Pt is anticipated to discharge home to intermediate ([facility] intermediate: phone, [phone]; fax, [phone]) once medically cleared.     Dai ([phone]) at [facility] is following and is able to accept pt for services at time of discharge. Unclear if pt/son are fully agreeable to hospice at this time. [facility] may be requiring hospice in order to return home for the extra support. SONYACM to follow-up with 1) pt/son tomorrow regarding plan for OP Palliative versus hospice 2) requirements for pt return to [facility]. CM following.       Elise Kendall, LATASHASW         no

## 2022-09-28 NOTE — DIETITIAN INITIAL EVALUATION ADULT - PERSON TAUGHT/METHOD
Encouraged PO intake as tolerated. Pt amenable to trying oral nutrition supplements to optimize intake. Further diet education deferred at this time as patient reports poor PO intake, lethargic, and asking for family. Pt made aware RD remains available./verbal instruction/patient instructed

## 2022-09-28 NOTE — PHYSICAL THERAPY INITIAL EVALUATION ADULT - GAIT DEVIATIONS NOTED, PT EVAL
decreased per/increased time in double stance/decreased velocity of limb motion/decreased step length/decreased stride length/decreased weight-shifting ability

## 2022-09-28 NOTE — OCCUPATIONAL THERAPY INITIAL EVALUATION ADULT - GENERAL OBSERVATIONS, REHAB EVAL
Patient found seated in bedside chair RN present throughout, +tele +IVL +EPM +CTx3 +still +torito +HFNC

## 2022-09-28 NOTE — DIETITIAN INITIAL EVALUATION ADULT - ORAL INTAKE PTA/DIET HISTORY
Pt reports good appetite and PO intake PTA with no recent changes. States, "I eat everything," confirmed NKFA.

## 2022-09-28 NOTE — DIETITIAN INITIAL EVALUATION ADULT - ETIOLOGY
related to decreased appetite in setting of recent procedure related to increased physiological demand

## 2022-09-28 NOTE — AIRWAY REMOVAL NOTE  ADULT & PEDS - ARTIFICAL AIRWAY REMOVAL COMMENTS
Written order for extubation verified. The patient was identified by full name and birth date compared to the identification band. Present during the procedure was BRUCE Lara

## 2022-09-28 NOTE — DIETITIAN INITIAL EVALUATION ADULT - REASON FOR ADMISSION
73yo male with PMH of BPH and HTN who presented to OSH with chest pain x 1 day. Transferred to [facility] for NSTEMI. Admitted 9/22. Now POD#1 s/p CABG, with acute systolic CHF. Extubated this morning (9/28).

## 2022-09-28 NOTE — PROGRESS NOTE ADULT - SUBJECTIVE AND OBJECTIVE BOX
Patient seen and examined at the bedside.    Remained critically ill on continuous ICU monitoring.    OBJECTIVE:  Vital Signs Last 24 Hrs  T(C): 37.4 (28 Sep 2022 22:00), Max: 37.4 (28 Sep 2022 22:00)  T(F): 99.3 (28 Sep 2022 22:00), Max: 99.3 (28 Sep 2022 22:00)  HR: 79 (28 Sep 2022 23:00) (62 - 99)  BP: --  BP(mean): --  RR: 19 (28 Sep 2022 23:00) (8 - 30)  SpO2: 93% (28 Sep 2022 23:00) (87% - 99%)    Parameters below as of 28 Sep 2022 22:00  Patient On (Oxygen Delivery Method): nasal cannula, high flow  O2 Flow (L/min): 50  O2 Concentration (%): 50    Physical Exam:   General: NAD  Neurology: nonfocal  Eyes: bilateral pupils equal and reactive   ENT/Neck: Neck supple, trachea midline, No JVD   Respiratory: Clear bilaterally   CV: S1S2, no murmurs        [x] Sternal dressing, [x] Mediastinal CT [x] R Pleural CT [x] L Pleural CT        [x] Sinus rhythm, [x] Temporary pacing - VVI @45  Abdominal: Soft, NT, ND +BS   Extremities: 1-2+ pedal edema noted, + peripheral pulses   Skin: No Rashes, Hematoma, Ecchymosis                Assessment:  Patient is a 72y Male with PMH of BPH, HTN, presented to OSH with chest pain x1 day, transferred to [facility] for NSTEMI. History obtained from patient using  (PI #[id number]). Patient was woken up last night around midnight with sudden-onset left-sided chest pain. The pain did not radiate anywhere else, was not associated with SOB or diaphoresis. He did have some mild dizziness. At OSH he was loaded with aspirin and plavix and received nitroglycerin x2, chest pain is now resolved. Patient denies fever, chills, abdominal pain, nausea, vomiting, changes in bowel habits, or urinary symptoms. He has no personal or family history of heart problems, never smoked cigarettes.     CAD s/p CABG 09/27/2022  acute systolic CHF  hemodynamic instability  Stress hyperglycemia   Thrombocytopenia    Plan:   ***Neuro***  [x] Nonfocal  Post operative neuro assessment   Continue with PT     ***Cardiovascular***  Invasive hemodynamic monitoring, assess perfusion indices   SR / CVP 8/ MAP 73/ Hct 30.0%/ Lactate 1.2  [x] Dobutamine - 2.5mcgs [x] Primacor - 0.1mcgs [x] Cardene -5mg/hr  Reassessment of hemodynamics post resuscitation   Monitor chest tube outputs   Amiodarone for afib prophylaxis   [x] VTE prophylaxis with Lovenox   [x] ASA [x] Statin  Serial EKG and cardiac enzymes     ***Pulmonary***  [x] HFNC 50%/50L  Encourage incentive spirometry, continue pulse ox monitoring, follow ABGs    ***GI***  [x] Diet: Tolerating a consistent carb diet   [x] Protonix   Bowel regimen with Miralax and Senna     ***Renal***  Continue to monitor I/Os, BUN/Creatinine.   Replete lytes PRN  Hooper present     ***ID***  Perioperative coverage with Cefuroxime.    ***Endocrine***  [x] Stress Hyperglycemia : HbA1c 5.3%                - [x] Insulin gtt              - Need tight glycemic control to prevent wound infection.        Patient requires continuous monitoring with bedside rhythm monitoring, pulse oximetry monitoring, and continuous central venous and arterial pressure monitoring; and intermittent blood gas analysis. Care plan discussed with the ICU care team.   Patient remained critical, at risk for life threatening decompensation.    I have spent 35 minutes providing critical care management to this patient.    By signing my name below, I, Trina Harvey, attest that this documentation has been prepared under the direction and in the presence of BECKI Fountain.  Electronically signed: Artur Montilla, 09-28-22 @ 23:07    I, Awa Lee, personally performed the services described in this documentation. all medical record entries made by the artur were at my direction and in my presence. I have reviewed the chart and agree that the record reflects my personal performance and is accurate and complete  Electronically signed: BECKI Fountain. Patient seen and examined at the bedside.    wean  overnight, lasix for xray congestion and low uop   and reglan started for gastric bubble on CXR   Remained critically ill on continuous ICU monitoring.    OBJECTIVE:  Vital Signs Last 24 Hrs  T(C): 37.4 (28 Sep 2022 22:00), Max: 37.4 (28 Sep 2022 22:00)  T(F): 99.3 (28 Sep 2022 22:00), Max: 99.3 (28 Sep 2022 22:00)  HR: 79 (28 Sep 2022 23:00) (62 - 99)  BP: --  BP(mean): --  RR: 19 (28 Sep 2022 23:00) (8 - 30)  SpO2: 93% (28 Sep 2022 23:00) (87% - 99%)    Parameters below as of 28 Sep 2022 22:00  Patient On (Oxygen Delivery Method): nasal cannula, high flow  O2 Flow (L/min): 50  O2 Concentration (%): 50    Physical Exam:   General: NAD  Neurology: nonfocal  Eyes: bilateral pupils equal and reactive   ENT/Neck: Neck supple, trachea midline, No JVD   Respiratory: Clear bilaterally   CV: S1S2, no murmurs        [x] Sternal dressing, [x] Mediastinal CT [x] R Pleural CT [x] L Pleural CT        [x] Sinus rhythm, [x] Temporary pacing - VVI @45  Abdominal: Soft, NT, ND +BS   Extremities: 1-2+ pedal edema noted, + peripheral pulses   Skin: No Rashes, Hematoma, Ecchymosis                Assessment:  Patient is a 72y Male with PMH of BPH, HTN, presented to OSH with chest pain x1 day, transferred to [facility] for NSTEMI. History obtained from patient using  (PI #[id number]). Patient was woken up last night around midnight with sudden-onset left-sided chest pain. The pain did not radiate anywhere else, was not associated with SOB or diaphoresis. He did have some mild dizziness. At OSH he was loaded with aspirin and plavix and received nitroglycerin x2, chest pain is now resolved. Patient denies fever, chills, abdominal pain, nausea, vomiting, changes in bowel habits, or urinary symptoms. He has no personal or family history of heart problems, never smoked cigarettes.     CAD s/p CABG 2022  acute systolic CHF  hemodynamic instability  Stress hyperglycemia   Thrombocytopenia  Hypoxic resp failure     Plan:   ***Neuro***  [x] Nonfocal  Post operative neuro assessment   Continue with PT     ***Cardiovascular***  Invasive hemodynamic monitoring, assess perfusion indices   SR / CVP 8/ MAP 73/ Hct 30.0%/ Lactate 1.2  [x] Dobutamine - 2.5mcgs- wean to 1.25  off cardene   Reassessment of hemodynamics post resuscitation   Monitor chest tube outputs   Amiodarone for afib prophylaxis   [x] VTE prophylaxis with Lovenox   [x] ASA [x] Statin  Serial EKG and cardiac enzymes     ***Pulmonary***  [x] HFNC 50%/50L  Encourage incentive spirometry, continue pulse ox monitoring, follow ABGs    ***GI***  [x] Diet: Tolerating a consistent carb diet   [x] Protonix   Bowel regimen with Miralax and Senna     ***Renal***  Continue to monitor I/Os, BUN/Creatinine.   Replete lytes PRN  Hooper present     ***ID***  Perioperative coverage with Cefuroxime.    ***Endocrine***  [x] Stress Hyperglycemia : HbA1c 5.3%                - [x] Insulin gtt              - Need tight glycemic control to prevent wound infection.        Patient requires continuous monitoring with bedside rhythm monitoring, pulse oximetry monitoring, and continuous central venous and arterial pressure monitoring; and intermittent blood gas analysis. Care plan discussed with the ICU care team.   Patient remained critical, at risk for life threatening decompensation.    I have spent 35 minutes providing critical care management to this patient.    By signing my name below, I, Trina Harvey, attest that this documentation has been prepared under the direction and in the presence of BECKI Fountain.  Electronically signed: Shayna Montilla, 22 @ 23:07    I, Awa Lee, personally performed the services described in this documentation. all medical record entries made by the juanibe were at my direction and in my presence. I have reviewed the chart and agree that the record reflects my personal performance and is accurate and complete  Electronically signed: BECKI Fountain.

## 2022-09-28 NOTE — DIETITIAN INITIAL EVALUATION ADULT - SIGNS/SYMPTOMS
as evidenced by patient reporting poor PO intake today s/p CABG as evidenced by patient POD#1 s/p CABG

## 2022-09-28 NOTE — OCCUPATIONAL THERAPY INITIAL EVALUATION ADULT - PERTINENT HX OF CURRENT PROBLEM, REHAB EVAL
2M admitted 9/22/22 PMHx BPH, HTN, presented to OSH with chest pain x1 day, transferred to [facility] for NSTEMI. Patient was woken up last night around midnight with sudden-onset left-sided chest pain. At OSH he was loaded with aspirin and plavix and received nitroglycerin x2, chest pain is now resolved.a/w NSTEMI, s/p CABG (9/27),

## 2022-09-28 NOTE — OCCUPATIONAL THERAPY INITIAL EVALUATION ADULT - LONG TERM MEMORY, REHAB EVAL
Pt would like appt for first OB, coming from fertility drAmarilys, now needs to start ob visits with regular dr. Cathy Marquez 9/23/18.  Pt is 9w 5d intact

## 2022-09-28 NOTE — PHYSICAL THERAPY INITIAL EVALUATION ADULT - GENERAL OBSERVATIONS, REHAB EVAL
recieved OOB sitting in chair, A&Ox3, following commands, willing to participate, a/w NSTEMI, s/p CABG (9/27), +chest tube x3, +still, +external pacer, +prevena wound vac, +HFNC, +ICU monitoring, Sami speaking with  used t/o. Pt educated on sternal precautions,

## 2022-09-28 NOTE — DIETITIAN INITIAL EVALUATION ADULT - PERTINENT LABORATORY DATA
09-28    145  |  111<H>  |  12  ----------------------------<  186<H>  4.7   |  22  |  0.93    Ca    8.6      28 Sep 2022 01:48  Phos  2.9     09-28  Mg     2.4     09-28    TPro  4.6<L>  /  Alb  3.2<L>  /  TBili  3.0<H>  /  DBili  x   /  AST  53<H>  /  ALT  32  /  AlkPhos  33<L>  09-28  POCT Blood Glucose.: 137 mg/dL (09-28-22 @ 11:56)  A1C with Estimated Average Glucose Result: 5.3 % (09-23-22 @ 06:37)

## 2022-09-28 NOTE — PROGRESS NOTE ADULT - SUBJECTIVE AND OBJECTIVE BOX
CRITICAL CARE ATTENDING - CTICU    MEDICATIONS  (STANDING):  acetaminophen     Tablet .. 650 milliGRAM(s) Oral every 6 hours  aMIOdarone    Tablet 400 milliGRAM(s) Oral two times a day  ascorbic acid 500 milliGRAM(s) Oral two times a day  aspirin Suppository 300 milliGRAM(s) Rectal once  cefuroxime  IVPB 1500 milliGRAM(s) IV Intermittent every 8 hours  chlorhexidine 2% Cloths 1 Application(s) Topical daily  DOBUTamine Infusion 2.5 MICROgram(s)/kG/Min (6.26 mL/Hr) IV Continuous <Continuous>  gabapentin 100 milliGRAM(s) Oral every 8 hours  insulin regular Infusion 2 Unit(s)/Hr (2 mL/Hr) IV Continuous <Continuous>  milrinone Infusion 0.2 MICROgram(s)/kG/Min (5.01 mL/Hr) IV Continuous <Continuous>  niCARdipine Infusion 5 mG/Hr (25 mL/Hr) IV Continuous <Continuous>  pantoprazole  Injectable 40 milliGRAM(s) IV Push daily  polyethylene glycol 3350 17 Gram(s) Oral daily  potassium chloride  10 mEq/50 mL IVPB 10 milliEquivalent(s) IV Intermittent every 1 hour  potassium chloride  10 mEq/50 mL IVPB 10 milliEquivalent(s) IV Intermittent every 1 hour  potassium chloride  10 mEq/50 mL IVPB 10 milliEquivalent(s) IV Intermittent every 1 hour  senna 2 Tablet(s) Oral at bedtime  sodium chloride 0.9%. 1000 milliLiter(s) (10 mL/Hr) IV Continuous <Continuous>                                    12.1   9.28  )-----------( 99       ( 28 Sep 2022 01:48 )             35.3           145  |  111<H>  |  12  ----------------------------<  186<H>  4.7   |  22  |  0.93    Ca    8.6      28 Sep 2022 01:48  Phos  2.9       Mg     2.4         TPro  4.6<L>  /  Alb  3.2<L>  /  TBili  3.0<H>  /  DBili  x   /  AST  53<H>  /  ALT  32  /  AlkPhos  33<L>        PT/INR - ( 28 Sep 2022 01:48 )   PT: 15.1 sec;   INR: 1.30 ratio         PTT - ( 28 Sep 2022 01:48 )  PTT:29.3 sec    Mode: CPAP with PS  FiO2: 40  PEEP: 5  PS: 5  MAP: 6  PIP: 10      Daily Height in cm: 180.34 (27 Sep 2022 13:04)    Daily Weight in k.4 (28 Sep 2022 00:00)       @ 07:  -   @ 07:00  --------------------------------------------------------  IN: 2913.2 mL / OUT: 1530 mL / NET: 1383.2 mL     @ 07:01  -   @ 08:08  --------------------------------------------------------  IN: 24.7 mL / OUT: 102 mL / NET: -77.3 mL        Critically Ill patient  : [ ] preoperative ,   [x] post operative    Requires :  [ ] Arterial Line   [ ] Central Line  [ ] PA catheter  [ ] IABP  [ ] ECMO  [ ] LVAD  [x] Ventilator  [x] pacemaker-TPM [ ] Impella.                      [ ] ABG's     [ ] Pulse Oxymetry Monitoring  Bedside evaluation , monitoring , treatment of hemodynamics , fluids , IVP/ IVCD meds.        Diagnosis:   POD 1- C3L (was L radial harvest c/b vfib intraop- clot found in graft)    Acute systolic chf    Hypovolemia     Hemodynamic lability,  instability. Requires IVCD [ ] vasopressors [x] inotropes  [ ] vasodilator  [x]IVSS fluid  to maintain MAP, perfusion, C.I.     Ventilator Management:  [x]AC-rest    [ ]CPAP-PS Wean    [ ]Trach Collar     [ ]Extubate    [ ] T-Piece  [ ]peep>5     Requires chest PT, pulmonary toilet, ambu bagging, suctioning to maintain SaO2,  patent airway and treat atelectasis.     Requires  [ ]DDD  [ ]VVI    [ ]AII  temporary pacing at      to maintain HR, MAP, CI, and perfusion.     Thrombocytopenia     Chest Tube Drainage     Lactic Acidosis     IVCD Insulin       I, Jose Armando, personally performed the services described in this documentation. All medical record entries made by the scribe were at my direction and in my presence. I have reviewed the chart and agree that the record reflects my personal performance and is accurate and complete.   Jose Armando MD.       By signing my name below, I, Dustin Herbert, attest that this documentation has been prepared under the direction and in the presence of Jose Armando MD.   Electronically Signed: Shayna Sharma -28-22 @ 08:08        Discussed with CT surgeon, Physician Assistant - Nurse Practitioner- Critical care medicine team.   Dicussed at  AM / PM rounds.   Chart, labs , films reviewed.    Total Time:  CRITICAL CARE ATTENDING - CTICU    MEDICATIONS  (STANDING):  acetaminophen     Tablet .. 650 milliGRAM(s) Oral every 6 hours  aMIOdarone    Tablet 400 milliGRAM(s) Oral two times a day  ascorbic acid 500 milliGRAM(s) Oral two times a day  aspirin Suppository 300 milliGRAM(s) Rectal once  cefuroxime  IVPB 1500 milliGRAM(s) IV Intermittent every 8 hours  chlorhexidine 2% Cloths 1 Application(s) Topical daily  DOBUTamine Infusion 2.5 MICROgram(s)/kG/Min (6.26 mL/Hr) IV Continuous <Continuous>  gabapentin 100 milliGRAM(s) Oral every 8 hours  insulin regular Infusion 2 Unit(s)/Hr (2 mL/Hr) IV Continuous <Continuous>  milrinone Infusion 0.2 MICROgram(s)/kG/Min (5.01 mL/Hr) IV Continuous <Continuous>  niCARdipine Infusion 5 mG/Hr (25 mL/Hr) IV Continuous <Continuous>  pantoprazole  Injectable 40 milliGRAM(s) IV Push daily  polyethylene glycol 3350 17 Gram(s) Oral daily  potassium chloride  10 mEq/50 mL IVPB 10 milliEquivalent(s) IV Intermittent every 1 hour  potassium chloride  10 mEq/50 mL IVPB 10 milliEquivalent(s) IV Intermittent every 1 hour  potassium chloride  10 mEq/50 mL IVPB 10 milliEquivalent(s) IV Intermittent every 1 hour  senna 2 Tablet(s) Oral at bedtime  sodium chloride 0.9%. 1000 milliLiter(s) (10 mL/Hr) IV Continuous <Continuous>                                    12.1   9.28  )-----------( 99       ( 28 Sep 2022 01:48 )             35.3           145  |  111<H>  |  12  ----------------------------<  186<H>  4.7   |  22  |  0.93    Ca    8.6      28 Sep 2022 01:48  Phos  2.9       Mg     2.4         TPro  4.6<L>  /  Alb  3.2<L>  /  TBili  3.0<H>  /  DBili  x   /  AST  53<H>  /  ALT  32  /  AlkPhos  33<L>        PT/INR - ( 28 Sep 2022 01:48 )   PT: 15.1 sec;   INR: 1.30 ratio         PTT - ( 28 Sep 2022 01:48 )  PTT:29.3 sec    Mode: CPAP with PS  FiO2: 40  PEEP: 5  PS: 5  MAP: 6  PIP: 10      Daily Height in cm: 180.34 (27 Sep 2022 13:04)    Daily Weight in k.4 (28 Sep 2022 00:00)       @ 07:  -   @ 07:00  --------------------------------------------------------  IN: 2913.2 mL / OUT: 1530 mL / NET: 1383.2 mL     @ 07:01  -   @ 08:08  --------------------------------------------------------  IN: 24.7 mL / OUT: 102 mL / NET: -77.3 mL        Critically Ill patient  : [ ] preoperative ,   [x] post operative    Requires :  [x] Arterial Line   [ ] Central Line  [ ] PA catheter  [ ] IABP  [ ] ECMO  [ ] LVAD  [x] Ventilator  [x] pacemaker-TPM [ ] Impella.                      [ ] ABG's     [ ] Pulse Oxymetry Monitoring  Bedside evaluation , monitoring , treatment of hemodynamics , fluids , IVP/ IVCD meds.        Diagnosis:   POD 1- C3L (was L radial harvest c/b vfib intraop- clot found in graft)    Acute systolic chf    Hypovolemia     Hemodynamic lability,  instability. Requires IVCD [ ] vasopressors [x] inotropes  [ ] vasodilator  [x]IVSS fluid  to maintain MAP, perfusion, C.I.     Ventilator Management:  [x]AC-rest    [ ]CPAP-PS Wean    [ ]Trach Collar     [ ]Extubate    [ ] T-Piece  [ ]peep>5     Requires chest PT, pulmonary toilet, ambu bagging, suctioning to maintain SaO2,  patent airway and treat atelectasis.     Requires  [ ]DDD  [ ]VVI    [ ]AII  temporary pacing at      to maintain HR, MAP, CI, and perfusion.     Thrombocytopenia     Chest Tube Drainage     Lactic Acidosis    Plan to take out Femoral [place] today     IVCD Insulin     Requires bedside physical therapy, mobilization and total [facility] care.     OOBTC today      I, Jose Armando, personally performed the services described in this documentation. All medical record entries made by the scribe were at my direction and in my presence. I have reviewed the chart and agree that the record reflects my personal performance and is accurate and complete.   Jose Armando MD.       By signing my name below, I, Dustin Herbert, attest that this documentation has been prepared under the direction and in the presence of Jose Armando MD.   Electronically Signed: Shayna Sharma -28-22 @ 08:08        Discussed with CT surgeon, Physician Assistant - Nurse Practitioner- Critical care medicine team.   Dicussed at  AM / PM rounds.   Chart, labs , films reviewed.    Total Time:  CRITICAL CARE ATTENDING - CTICU    MEDICATIONS  (STANDING):  acetaminophen     Tablet .. 650 milliGRAM(s) Oral every 6 hours  aMIOdarone    Tablet 400 milliGRAM(s) Oral two times a day  ascorbic acid 500 milliGRAM(s) Oral two times a day  aspirin Suppository 300 milliGRAM(s) Rectal once  cefuroxime  IVPB 1500 milliGRAM(s) IV Intermittent every 8 hours  chlorhexidine 2% Cloths 1 Application(s) Topical daily  DOBUTamine Infusion 2.5 MICROgram(s)/kG/Min (6.26 mL/Hr) IV Continuous <Continuous>  gabapentin 100 milliGRAM(s) Oral every 8 hours  insulin regular Infusion 2 Unit(s)/Hr (2 mL/Hr) IV Continuous <Continuous>  milrinone Infusion 0.2 MICROgram(s)/kG/Min (5.01 mL/Hr) IV Continuous <Continuous>  niCARdipine Infusion 5 mG/Hr (25 mL/Hr) IV Continuous <Continuous>  pantoprazole  Injectable 40 milliGRAM(s) IV Push daily  polyethylene glycol 3350 17 Gram(s) Oral daily  potassium chloride  10 mEq/50 mL IVPB 10 milliEquivalent(s) IV Intermittent every 1 hour  potassium chloride  10 mEq/50 mL IVPB 10 milliEquivalent(s) IV Intermittent every 1 hour  potassium chloride  10 mEq/50 mL IVPB 10 milliEquivalent(s) IV Intermittent every 1 hour  senna 2 Tablet(s) Oral at bedtime  sodium chloride 0.9%. 1000 milliLiter(s) (10 mL/Hr) IV Continuous <Continuous>                                    12.1   9.28  )-----------( 99       ( 28 Sep 2022 01:48 )             35.3           145  |  111<H>  |  12  ----------------------------<  186<H>  4.7   |  22  |  0.93    Ca    8.6      28 Sep 2022 01:48  Phos  2.9       Mg     2.4         TPro  4.6<L>  /  Alb  3.2<L>  /  TBili  3.0<H>  /  DBili  x   /  AST  53<H>  /  ALT  32  /  AlkPhos  33<L>        PT/INR - ( 28 Sep 2022 01:48 )   PT: 15.1 sec;   INR: 1.30 ratio         PTT - ( 28 Sep 2022 01:48 )  PTT:29.3 sec    Mode: CPAP with PS  FiO2: 40  PEEP: 5  PS: 5  MAP: 6  PIP: 10      Daily Height in cm: 180.34 (27 Sep 2022 13:04)    Daily Weight in k.4 (28 Sep 2022 00:00)       @ 07:  -   @ 07:00  --------------------------------------------------------  IN: 2913.2 mL / OUT: 1530 mL / NET: 1383.2 mL     @ 07:01  -   @ 08:08  --------------------------------------------------------  IN: 24.7 mL / OUT: 102 mL / NET: -77.3 mL        Critically Ill patient  : [ ] preoperative ,   [x] post operative    Requires :  [x] Arterial Line   [x ] Central Line  [ ] PA catheter  [ ] IABP  [ ] ECMO  [ ] LVAD  [ ] Ventilator  [x] pacemaker-TPM [ ] Impella.                      [ ] ABG's     [ ] Pulse Oxymetry Monitoring  Bedside evaluation , monitoring , treatment of hemodynamics , fluids , IVP/ IVCD meds.        Diagnosis:       POD 1- C3L (was L radial harvest c/b vfib intraop- clot found in graft)    V Fib in OR - clotted graft     CHF- acute [x ]   chronic [ ]    systolic [x ]   diastolic [ ]  Valvular [ ]          - Echo- EF -  40's / V Fib            [ ] RV dysfunction          - Cxr-cardiomegally, edema          - Clinical-  [ x]inotropes   [ ]pressors   [ x]diuresis   [ ]IABP   [ ]ECMO   [ ]LVAD   [ ]Respiratory Failure    -     Hypovolemia     Hemodynamic lability,  instability. Requires IVCD [ ] vasopressors [x] inotropes  [ x] vasodilator  [x]IVSS fluid  to maintain MAP, perfusion, C.I.     Ventilator Management:  [x]AC-rest    [ ]CPAP-PS Wean    [ ]Trach Collar     [ x]Extubate    [ ] T-Piece  [ ]peep>5     Requires chest PT, pulmonary toilet, ambu bagging, suctioning to maintain SaO2,  patent airway and treat atelectasis.     Temporary pacemaker (TPM) interrogation and setting.     Requires  [x ]DDD  [ ]VVI    [ ]AII  temporary pacing at  80 min    to maintain HR, MAP, CI, and perfusion.     Thrombocytopenia     Chest Tube Drainage     Lactic Acidosis    Plan to take out Femoral [place] today     IVCD Insulin     Requires bedside physical therapy, mobilization and total group home care.       I, Jose Armando, personally performed the services described in this documentation. All medical record entries made by the scribe were at my direction and in my presence. I have reviewed the chart and agree that the record reflects my personal performance and is accurate and complete.   Jose Armando MD.       By signing my name below, I, Dustin Herbert, attest that this documentation has been prepared under the direction and in the presence of Jose Armando MD.   Electronically Signed: Shayna Sharma - @ 08:08        Discussed with CT surgeon, Physician Assistant - Nurse Practitioner- Critical care medicine team.   Dicussed at  AM / PM rounds.   Chart, labs , films reviewed.    Total Time:  40 min

## 2022-09-28 NOTE — DIETITIAN INITIAL EVALUATION ADULT - ADD RECOMMEND
Add multivitamin (if no medical contraindications), continue vitamin C as able. Monitor PO intake, weight, labs, skin, GI status, diet.

## 2022-09-28 NOTE — PHYSICAL THERAPY INITIAL EVALUATION ADULT - PERTINENT HX OF CURRENT PROBLEM, REHAB EVAL
Pt is 72M admitted 9/22/22 PMHx BPH, HTN, presented to OSH with chest pain x1 day, transferred to [facility] for NSTEMI. Patient was woken up last night around midnight with sudden-onset left-sided chest pain. At OSH he was loaded with aspirin and plavix and received nitroglycerin x2, chest pain is now resolved.

## 2022-09-28 NOTE — DIETITIAN INITIAL EVALUATION ADULT - OTHER INFO
Pt reports stable UBW 82 kg PTA. Weights in-house ranging from 81.2 kg (9/27) to 88.4 kg (9/28), suggesting relative weight stability with some fluctuations likely in setting of fluid shifts. Will continue to monitor and trend weights as able.

## 2022-09-29 LAB
ALBUMIN SERPL ELPH-MCNC: 3.8 G/DL — SIGNIFICANT CHANGE UP (ref 3.3–5)
ALP SERPL-CCNC: 32 U/L — LOW (ref 40–120)
ALT FLD-CCNC: 52 U/L — HIGH (ref 10–45)
ANION GAP SERPL CALC-SCNC: 12 MMOL/L — SIGNIFICANT CHANGE UP (ref 5–17)
AST SERPL-CCNC: 60 U/L — HIGH (ref 10–40)
BASE EXCESS BLDV CALC-SCNC: 1.5 MMOL/L — SIGNIFICANT CHANGE UP (ref -2–3)
BASOPHILS # BLD AUTO: 0 K/UL — SIGNIFICANT CHANGE UP (ref 0–0.2)
BASOPHILS NFR BLD AUTO: 0 % — SIGNIFICANT CHANGE UP (ref 0–2)
BILIRUB SERPL-MCNC: 4.4 MG/DL — HIGH (ref 0.2–1.2)
BLD GP AB SCN SERPL QL: NEGATIVE — SIGNIFICANT CHANGE UP
BUN SERPL-MCNC: 18 MG/DL — SIGNIFICANT CHANGE UP (ref 7–23)
CALCIUM SERPL-MCNC: 8.7 MG/DL — SIGNIFICANT CHANGE UP (ref 8.4–10.5)
CHLORIDE SERPL-SCNC: 105 MMOL/L — SIGNIFICANT CHANGE UP (ref 96–108)
CK MB BLD-MCNC: 1.5 % — SIGNIFICANT CHANGE UP (ref 0–3.5)
CK MB CFR SERPL CALC: 21.6 NG/ML — HIGH (ref 0–6.7)
CK SERPL-CCNC: 1424 U/L — HIGH (ref 30–200)
CO2 BLDV-SCNC: 29 MMOL/L — HIGH (ref 22–26)
CO2 SERPL-SCNC: 23 MMOL/L — SIGNIFICANT CHANGE UP (ref 22–31)
CREAT SERPL-MCNC: 1.01 MG/DL — SIGNIFICANT CHANGE UP (ref 0.5–1.3)
EGFR: 78 ML/MIN/1.73M2 — SIGNIFICANT CHANGE UP
EOSINOPHIL # BLD AUTO: 0 K/UL — SIGNIFICANT CHANGE UP (ref 0–0.5)
EOSINOPHIL NFR BLD AUTO: 0 % — SIGNIFICANT CHANGE UP (ref 0–6)
GAS PNL BLDA: SIGNIFICANT CHANGE UP
GAS PNL BLDV: SIGNIFICANT CHANGE UP
GAS PNL BLDV: SIGNIFICANT CHANGE UP
GLUCOSE BLDC GLUCOMTR-MCNC: 125 MG/DL — HIGH (ref 70–99)
GLUCOSE BLDC GLUCOMTR-MCNC: 126 MG/DL — HIGH (ref 70–99)
GLUCOSE BLDC GLUCOMTR-MCNC: 130 MG/DL — HIGH (ref 70–99)
GLUCOSE BLDC GLUCOMTR-MCNC: 131 MG/DL — HIGH (ref 70–99)
GLUCOSE BLDC GLUCOMTR-MCNC: 132 MG/DL — HIGH (ref 70–99)
GLUCOSE BLDC GLUCOMTR-MCNC: 133 MG/DL — HIGH (ref 70–99)
GLUCOSE BLDC GLUCOMTR-MCNC: 136 MG/DL — HIGH (ref 70–99)
GLUCOSE BLDC GLUCOMTR-MCNC: 138 MG/DL — HIGH (ref 70–99)
GLUCOSE BLDC GLUCOMTR-MCNC: 141 MG/DL — HIGH (ref 70–99)
GLUCOSE BLDC GLUCOMTR-MCNC: 143 MG/DL — HIGH (ref 70–99)
GLUCOSE BLDC GLUCOMTR-MCNC: 146 MG/DL — HIGH (ref 70–99)
GLUCOSE BLDC GLUCOMTR-MCNC: 149 MG/DL — HIGH (ref 70–99)
GLUCOSE BLDC GLUCOMTR-MCNC: 154 MG/DL — HIGH (ref 70–99)
GLUCOSE BLDC GLUCOMTR-MCNC: 156 MG/DL — HIGH (ref 70–99)
GLUCOSE BLDC GLUCOMTR-MCNC: 156 MG/DL — HIGH (ref 70–99)
GLUCOSE BLDC GLUCOMTR-MCNC: 161 MG/DL — HIGH (ref 70–99)
GLUCOSE BLDC GLUCOMTR-MCNC: 163 MG/DL — HIGH (ref 70–99)
GLUCOSE BLDC GLUCOMTR-MCNC: 177 MG/DL — HIGH (ref 70–99)
GLUCOSE SERPL-MCNC: 131 MG/DL — HIGH (ref 70–99)
HCO3 BLDV-SCNC: 27 MMOL/L — SIGNIFICANT CHANGE UP (ref 22–29)
HCT VFR BLD CALC: 27.9 % — LOW (ref 39–50)
HGB BLD-MCNC: 9.8 G/DL — LOW (ref 13–17)
HOROWITZ INDEX BLDV+IHG-RTO: 50 — SIGNIFICANT CHANGE UP
IMM GRANULOCYTES NFR BLD AUTO: 0.4 % — SIGNIFICANT CHANGE UP (ref 0–0.9)
LIDOCAIN SERPL-MCNC: 2.1 MG/L — SIGNIFICANT CHANGE UP (ref 1.5–5)
LYMPHOCYTES # BLD AUTO: 0.87 K/UL — LOW (ref 1–3.3)
LYMPHOCYTES # BLD AUTO: 11.2 % — LOW (ref 13–44)
MAGNESIUM SERPL-MCNC: 2 MG/DL — SIGNIFICANT CHANGE UP (ref 1.6–2.6)
MCHC RBC-ENTMCNC: 32.5 PG — SIGNIFICANT CHANGE UP (ref 27–34)
MCHC RBC-ENTMCNC: 35.1 GM/DL — SIGNIFICANT CHANGE UP (ref 32–36)
MCV RBC AUTO: 92.4 FL — SIGNIFICANT CHANGE UP (ref 80–100)
MONOCYTES # BLD AUTO: 0.71 K/UL — SIGNIFICANT CHANGE UP (ref 0–0.9)
MONOCYTES NFR BLD AUTO: 9.1 % — SIGNIFICANT CHANGE UP (ref 2–14)
NEUTROPHILS # BLD AUTO: 6.16 K/UL — SIGNIFICANT CHANGE UP (ref 1.8–7.4)
NEUTROPHILS NFR BLD AUTO: 79.3 % — HIGH (ref 43–77)
NRBC # BLD: 0 /100 WBCS — SIGNIFICANT CHANGE UP (ref 0–0)
PCO2 BLDV: 46 MMHG — SIGNIFICANT CHANGE UP (ref 42–55)
PH BLDV: 7.38 — SIGNIFICANT CHANGE UP (ref 7.32–7.43)
PHOSPHATE SERPL-MCNC: 3.4 MG/DL — SIGNIFICANT CHANGE UP (ref 2.5–4.5)
PLATELET # BLD AUTO: 106 K/UL — LOW (ref 150–400)
PO2 BLDV: 40 MMHG — SIGNIFICANT CHANGE UP (ref 25–45)
POTASSIUM BLDA-SCNC: 4.6 MMOL/L — SIGNIFICANT CHANGE UP (ref 3.5–5.1)
POTASSIUM SERPL-MCNC: 3.9 MMOL/L — SIGNIFICANT CHANGE UP (ref 3.5–5.3)
POTASSIUM SERPL-SCNC: 3.9 MMOL/L — SIGNIFICANT CHANGE UP (ref 3.5–5.3)
PROT SERPL-MCNC: 5.3 G/DL — LOW (ref 6–8.3)
RBC # BLD: 3.02 M/UL — LOW (ref 4.2–5.8)
RBC # FLD: 11.7 % — SIGNIFICANT CHANGE UP (ref 10.3–14.5)
RH IG SCN BLD-IMP: POSITIVE — SIGNIFICANT CHANGE UP
SAO2 % BLDV: 64.7 % — LOW (ref 67–88)
SODIUM SERPL-SCNC: 140 MMOL/L — SIGNIFICANT CHANGE UP (ref 135–145)
TROPONIN T, HIGH SENSITIVITY RESULT: 664 NG/L — HIGH (ref 0–51)
WBC # BLD: 7.77 K/UL — SIGNIFICANT CHANGE UP (ref 3.8–10.5)
WBC # FLD AUTO: 7.77 K/UL — SIGNIFICANT CHANGE UP (ref 3.8–10.5)

## 2022-09-29 PROCEDURE — 71045 X-RAY EXAM CHEST 1 VIEW: CPT | Mod: 26,77

## 2022-09-29 PROCEDURE — 71045 X-RAY EXAM CHEST 1 VIEW: CPT | Mod: 26

## 2022-09-29 PROCEDURE — 99291 CRITICAL CARE FIRST HOUR: CPT

## 2022-09-29 PROCEDURE — 93010 ELECTROCARDIOGRAM REPORT: CPT

## 2022-09-29 RX ORDER — TAMSULOSIN HYDROCHLORIDE 0.4 MG/1
0.4 CAPSULE ORAL AT BEDTIME
Refills: 0 | Status: DISCONTINUED | OUTPATIENT
Start: 2022-09-29 | End: 2022-10-04

## 2022-09-29 RX ORDER — MAGNESIUM SULFATE 500 MG/ML
2 VIAL (ML) INJECTION ONCE
Refills: 0 | Status: COMPLETED | OUTPATIENT
Start: 2022-09-29 | End: 2022-09-29

## 2022-09-29 RX ORDER — FINASTERIDE 5 MG/1
5 TABLET, FILM COATED ORAL DAILY
Refills: 0 | Status: DISCONTINUED | OUTPATIENT
Start: 2022-09-29 | End: 2022-10-04

## 2022-09-29 RX ORDER — INSULIN LISPRO 100/ML
VIAL (ML) SUBCUTANEOUS AT BEDTIME
Refills: 0 | Status: DISCONTINUED | OUTPATIENT
Start: 2022-09-29 | End: 2022-10-04

## 2022-09-29 RX ORDER — FUROSEMIDE 40 MG
20 TABLET ORAL ONCE
Refills: 0 | Status: COMPLETED | OUTPATIENT
Start: 2022-09-29 | End: 2022-09-29

## 2022-09-29 RX ORDER — METOPROLOL TARTRATE 50 MG
25 TABLET ORAL
Refills: 0 | Status: DISCONTINUED | OUTPATIENT
Start: 2022-09-29 | End: 2022-10-04

## 2022-09-29 RX ORDER — POTASSIUM CHLORIDE 20 MEQ
10 PACKET (EA) ORAL ONCE
Refills: 0 | Status: COMPLETED | OUTPATIENT
Start: 2022-09-29 | End: 2022-09-29

## 2022-09-29 RX ORDER — METOCLOPRAMIDE HCL 10 MG
10 TABLET ORAL EVERY 8 HOURS
Refills: 0 | Status: COMPLETED | OUTPATIENT
Start: 2022-09-29 | End: 2022-09-29

## 2022-09-29 RX ORDER — INSULIN LISPRO 100/ML
VIAL (ML) SUBCUTANEOUS
Refills: 0 | Status: DISCONTINUED | OUTPATIENT
Start: 2022-09-29 | End: 2022-10-04

## 2022-09-29 RX ORDER — FUROSEMIDE 40 MG
40 TABLET ORAL ONCE
Refills: 0 | Status: COMPLETED | OUTPATIENT
Start: 2022-09-29 | End: 2022-09-29

## 2022-09-29 RX ORDER — POTASSIUM CHLORIDE 20 MEQ
10 PACKET (EA) ORAL
Refills: 0 | Status: COMPLETED | OUTPATIENT
Start: 2022-09-29 | End: 2022-09-29

## 2022-09-29 RX ADMIN — GABAPENTIN 100 MILLIGRAM(S): 400 CAPSULE ORAL at 05:42

## 2022-09-29 RX ADMIN — CHLORHEXIDINE GLUCONATE 1 APPLICATION(S): 213 SOLUTION TOPICAL at 11:43

## 2022-09-29 RX ADMIN — Medication 50 MILLIEQUIVALENT(S): at 10:16

## 2022-09-29 RX ADMIN — OXYCODONE HYDROCHLORIDE 10 MILLIGRAM(S): 5 TABLET ORAL at 08:36

## 2022-09-29 RX ADMIN — AMIODARONE HYDROCHLORIDE 400 MILLIGRAM(S): 400 TABLET ORAL at 05:42

## 2022-09-29 RX ADMIN — Medication 100 MILLIGRAM(S): at 10:18

## 2022-09-29 RX ADMIN — POLYETHYLENE GLYCOL 3350 17 GRAM(S): 17 POWDER, FOR SOLUTION ORAL at 11:43

## 2022-09-29 RX ADMIN — GABAPENTIN 100 MILLIGRAM(S): 400 CAPSULE ORAL at 13:10

## 2022-09-29 RX ADMIN — Medication 40 MILLIGRAM(S): at 20:30

## 2022-09-29 RX ADMIN — Medication 650 MILLIGRAM(S): at 16:41

## 2022-09-29 RX ADMIN — PANTOPRAZOLE SODIUM 40 MILLIGRAM(S): 20 TABLET, DELAYED RELEASE ORAL at 07:43

## 2022-09-29 RX ADMIN — HYDROMORPHONE HYDROCHLORIDE 0.5 MILLIGRAM(S): 2 INJECTION INTRAMUSCULAR; INTRAVENOUS; SUBCUTANEOUS at 12:35

## 2022-09-29 RX ADMIN — GABAPENTIN 100 MILLIGRAM(S): 400 CAPSULE ORAL at 23:12

## 2022-09-29 RX ADMIN — Medication 50 MILLIEQUIVALENT(S): at 12:38

## 2022-09-29 RX ADMIN — Medication 50 MILLIEQUIVALENT(S): at 02:23

## 2022-09-29 RX ADMIN — Medication 10 MILLIGRAM(S): at 23:13

## 2022-09-29 RX ADMIN — HYDROMORPHONE HYDROCHLORIDE 0.5 MILLIGRAM(S): 2 INJECTION INTRAMUSCULAR; INTRAVENOUS; SUBCUTANEOUS at 12:50

## 2022-09-29 RX ADMIN — Medication 650 MILLIGRAM(S): at 00:55

## 2022-09-29 RX ADMIN — ATORVASTATIN CALCIUM 40 MILLIGRAM(S): 80 TABLET, FILM COATED ORAL at 22:00

## 2022-09-29 RX ADMIN — Medication 500 MILLIGRAM(S): at 16:41

## 2022-09-29 RX ADMIN — Medication 50 MILLIEQUIVALENT(S): at 11:41

## 2022-09-29 RX ADMIN — OXYCODONE HYDROCHLORIDE 10 MILLIGRAM(S): 5 TABLET ORAL at 14:53

## 2022-09-29 RX ADMIN — HYDROMORPHONE HYDROCHLORIDE 0.5 MILLIGRAM(S): 2 INJECTION INTRAMUSCULAR; INTRAVENOUS; SUBCUTANEOUS at 01:44

## 2022-09-29 RX ADMIN — FINASTERIDE 5 MILLIGRAM(S): 5 TABLET, FILM COATED ORAL at 16:40

## 2022-09-29 RX ADMIN — Medication 20 MILLIGRAM(S): at 03:40

## 2022-09-29 RX ADMIN — Medication 10 MILLIGRAM(S): at 13:10

## 2022-09-29 RX ADMIN — Medication 500 MILLIGRAM(S): at 05:42

## 2022-09-29 RX ADMIN — Medication 650 MILLIGRAM(S): at 05:42

## 2022-09-29 RX ADMIN — Medication 25 GRAM(S): at 02:24

## 2022-09-29 RX ADMIN — Medication 650 MILLIGRAM(S): at 11:41

## 2022-09-29 RX ADMIN — Medication 100 MILLIGRAM(S): at 02:24

## 2022-09-29 RX ADMIN — Medication 25 MILLIGRAM(S): at 16:40

## 2022-09-29 RX ADMIN — Medication 10 MILLIGRAM(S): at 03:40

## 2022-09-29 RX ADMIN — TAMSULOSIN HYDROCHLORIDE 0.4 MILLIGRAM(S): 0.4 CAPSULE ORAL at 16:40

## 2022-09-29 RX ADMIN — Medication 20 MILLIGRAM(S): at 11:03

## 2022-09-29 RX ADMIN — Medication 81 MILLIGRAM(S): at 11:42

## 2022-09-29 RX ADMIN — AMIODARONE HYDROCHLORIDE 400 MILLIGRAM(S): 400 TABLET ORAL at 16:41

## 2022-09-29 RX ADMIN — ENOXAPARIN SODIUM 40 MILLIGRAM(S): 100 INJECTION SUBCUTANEOUS at 14:53

## 2022-09-29 RX ADMIN — SENNA PLUS 2 TABLET(S): 8.6 TABLET ORAL at 23:13

## 2022-09-29 RX ADMIN — HYDROMORPHONE HYDROCHLORIDE 0.5 MILLIGRAM(S): 2 INJECTION INTRAMUSCULAR; INTRAVENOUS; SUBCUTANEOUS at 02:29

## 2022-09-29 RX ADMIN — OXYCODONE HYDROCHLORIDE 10 MILLIGRAM(S): 5 TABLET ORAL at 09:06

## 2022-09-29 NOTE — PROGRESS NOTE ADULT - SUBJECTIVE AND OBJECTIVE BOX
CRITICAL CARE ATTENDING - CTICU    MEDICATIONS  (STANDING):  acetaminophen     Tablet .. 650 milliGRAM(s) Oral every 6 hours  aMIOdarone    Tablet 400 milliGRAM(s) Oral two times a day  ascorbic acid 500 milliGRAM(s) Oral two times a day  aspirin enteric coated 81 milliGRAM(s) Oral daily  atorvastatin 40 milliGRAM(s) Oral at bedtime  bisacodyl Suppository 10 milliGRAM(s) Rectal once  cefuroxime  IVPB 1500 milliGRAM(s) IV Intermittent every 8 hours  chlorhexidine 2% Cloths 1 Application(s) Topical daily  DOBUTamine Infusion 1.25 MICROgram(s)/kG/Min (3.13 mL/Hr) IV Continuous <Continuous>  enoxaparin Injectable 40 milliGRAM(s) SubCutaneous every 24 hours  gabapentin 100 milliGRAM(s) Oral every 8 hours  insulin regular Infusion 2 Unit(s)/Hr (2 mL/Hr) IV Continuous <Continuous>  metoclopramide Injectable 10 milliGRAM(s) IV Push every 8 hours  pantoprazole    Tablet 40 milliGRAM(s) Oral before breakfast  polyethylene glycol 3350 17 Gram(s) Oral daily  senna 2 Tablet(s) Oral at bedtime  sodium chloride 0.9%. 1000 milliLiter(s) (10 mL/Hr) IV Continuous <Continuous>                                    9.8    7.77  )-----------( 106      ( 29 Sep 2022 00:39 )             27.9       09-29    140  |  105  |  18  ----------------------------<  131<H>  3.9   |  23  |  1.01    Ca    8.7      29 Sep 2022 00:39  Phos  3.4     09-29  Mg     2.0     09-29    TPro  5.3<L>  /  Alb  3.8  /  TBili  4.4<H>  /  DBili  x   /  AST  60<H>  /  ALT  52<H>  /  AlkPhos  32<L>  09-29      PT/INR - ( 28 Sep 2022 01:48 )   PT: 15.1 sec;   INR: 1.30 ratio         PTT - ( 28 Sep 2022 01:48 )  PTT:29.3 sec        Daily     Daily       09-28 @ 07:01  -  09-29 @ 07:00  --------------------------------------------------------  IN: 1532.3 mL / OUT: 2022 mL / NET: -489.7 mL    09-29 @ 07:01  - 09-29 @ 08:02  --------------------------------------------------------  IN: 12 mL / OUT: 125 mL / NET: -113 mL        Critically Ill patient  : [ ] preoperative ,   [x] post operative    Requires :  [x] Arterial Line   [x ] Central Line  [ ] PA catheter  [ ] IABP  [ ] ECMO  [ ] LVAD  [ ] Ventilator  [x] pacemaker-TPM [ ] Impella.                      [ ] ABG's     [ ] Pulse Oxymetry Monitoring  Bedside evaluation , monitoring , treatment of hemodynamics , fluids , IVP/ IVCD meds.        Diagnosis:       POD 1- C3L (was L radial harvest c/b vfib intraop- clot found in graft)    V Fib in OR - clotted graft     CHF- acute [x ]   chronic [ ]    systolic [x ]   diastolic [ ]  Valvular [ ]          - Echo- EF -  40's / V Fib            [ ] RV dysfunction          - Cxr-cardiomegally, edema          - Clinical-  [ x]inotropes   [ ]pressors   [ x]diuresis   [ ]IABP   [ ]ECMO   [ ]LVAD   [ ]Respiratory Failure    -     Hypovolemia     Hemodynamic lability,  instability. Requires IVCD [ ] vasopressors [x] inotropes  [ x] vasodilator  [x]IVSS fluid  to maintain MAP, perfusion, C.I.     Extubated yesterday     Hypoxemia - Requires [ ] BIPAP  [x] HF O2- 50L/70%    Requires chest PT, pulmonary toilet, suctioning to maintain SaO2,  patent airway and treat atelectasis.     Temporary pacemaker (TPM) interrogation and setting.     Requires  [ ]DDD  [x]VVI    [ ]AII  temporary pacing at  45 min    to maintain HR, MAP, CI, and perfusion.     Thrombocytopenia     Chest Tube Drainage     Femoral A line taken out yesterday     IVCD Insulin     Requires bedside physical therapy, mobilization and total half-way care.             I, Jose Armando, personally performed the services described in this documentation. All medical record entries made by the scribe were at my direction and in my presence. I have reviewed the chart and agree that the record reflects my personal performance and is accurate and complete.   Jose Armando MD.       By signing my name below, I, Dustin Herbert, attest that this documentation has been prepared under the direction and in the presence of Jose Armando MD.   Electronically Signed: Shayna Sharma 09-29-22 @ 08:02        Discussed with CT surgeon, Physician Assistant - Nurse Practitioner- Critical care medicine team.   Dicussed at  AM / PM rounds.   Chart, labs , films reviewed.    Total Time:  CRITICAL CARE ATTENDING - [facility]ICU    MEDICATIONS  (STANDING):  acetaminophen     Tablet .. 650 milliGRAM(s) Oral every 6 hours  aMIOdarone    Tablet 400 milliGRAM(s) Oral two times a day  ascorbic acid 500 milliGRAM(s) Oral two times a day  aspirin enteric coated 81 milliGRAM(s) Oral daily  atorvastatin 40 milliGRAM(s) Oral at bedtime  bisacodyl Suppository 10 milliGRAM(s) Rectal once  cefuroxime  IVPB 1500 milliGRAM(s) IV Intermittent every 8 hours  chlorhexidine 2% Cloths 1 Application(s) Topical daily  DOBUTamine Infusion 1.25 MICROgram(s)/kG/Min (3.13 mL/Hr) IV Continuous <Continuous>  enoxaparin Injectable 40 milliGRAM(s) SubCutaneous every 24 hours  gabapentin 100 milliGRAM(s) Oral every 8 hours  insulin regular Infusion 2 Unit(s)/Hr (2 mL/Hr) IV Continuous <Continuous>  metoclopramide Injectable 10 milliGRAM(s) IV Push every 8 hours  pantoprazole    Tablet 40 milliGRAM(s) Oral before breakfast  polyethylene glycol 3350 17 Gram(s) Oral daily  senna 2 Tablet(s) Oral at bedtime  sodium chloride 0.9%. 1000 milliLiter(s) (10 mL/Hr) IV Continuous <Continuous>                                    9.8    7.77  )-----------( 106      ( 29 Sep 2022 00:39 )             27.9       09-29    140  |  105  |  18  ----------------------------<  131<H>  3.9   |  23  |  1.01    Ca    8.7      29 Sep 2022 00:39  Phos  3.4     09-29  Mg     2.0     09-29    TPro  5.3<L>  /  Alb  3.8  /  TBili  4.4<H>  /  DBili  x   /  AST  60<H>  /  ALT  52<H>  /  AlkPhos  32<L>  09-29      PT/INR - ( 28 Sep 2022 01:48 )   PT: 15.1 sec;   INR: 1.30 ratio         PTT - ( 28 Sep 2022 01:48 )  PTT:29.3 sec        Daily     Daily       09-28 @ 07:01  -  09-29 @ 07:00  --------------------------------------------------------  IN: 1532.3 mL / OUT: 2022 mL / NET: -489.7 mL    09-29 @ 07:01  -  09-29 @ 08:02  --------------------------------------------------------  IN: 12 mL / OUT: 125 mL / NET: -113 mL        Critically Ill patient  : [ ] preoperative ,   [x] post operative    Requires :  [x] Arterial Line   [x ] Central Line  [ ] PA catheter  [ ] IABP  [ ] ECMO  [ ] LVAD  [ ] Ventilator  [x] pacemaker-TPM [ ] Impella.                      [ ] ABG's     [ ] Pulse Oxymetry Monitoring  Bedside evaluation , monitoring , treatment of hemodynamics , fluids , IVP/ IVCD meds.        Diagnosis:       POD 2- C3L (was L radial harvest c/b vfib intraop- clot found in graft)    V Fib in OR - clotted graft     CHF- acute [x ]   chronic [ ]    systolic [x ]   diastolic [ ]  Valvular [ ]          - Echo- EF -  40's / V Fib            [ ] RV dysfunction          - Cxr-cardiomegally, edema          - Clinical-  [ x]inotropes   [ ]pressors   [ x]diuresis   [ ]IABP   [ ]ECMO   [ ]LVAD   [ ]Respiratory Failure    -     Hypovolemia     Hemodynamic lability,  instability. Requires IVCD [ ] vasopressors [x] inotropes  [ x] vasodilator  [x]IVSS fluid  to maintain MAP, perfusion, C.I.     Extubated yesterday     Hypoxemia - Requires [ ] BIPAP  [x] HF O2- 50L/70%    Requires chest PT, pulmonary toilet, suctioning to maintain SaO2,  patent airway and treat atelectasis.     Temporary pacemaker (TPM) interrogation and setting.     Requires  [ ]DDD  [x]VVI    [ ]AII  temporary pacing at  45 min    to maintain HR, MAP, CI, and perfusion.     Thrombocytopenia     Chest Tube Drainage     Femoral A line taken out yesterday     IVCD Insulin     Requires bedside physical therapy, mobilization and total snf care.             I, Jose Armando, personally performed the services described in this documentation. All medical record entries made by the scribe were at my direction and in my presence. I have reviewed the chart and agree that the record reflects my personal performance and is accurate and complete.   Jose Armando MD.       By signing my name below, I, Dustin Herbert, attest that this documentation has been prepared under the direction and in the presence of Jose Armando MD.   Electronically Signed: Shayna Sharma 09-29-22 @ 08:02        Discussed with CT surgeon, Physician Assistant - Nurse Practitioner- Critical care medicine team.   Dicussed at  AM / PM rounds.   Chart, labs , films reviewed.    Total Time:  CRITICAL CARE ATTENDING - [facility]    MEDICATIONS  (STANDING):  acetaminophen     Tablet .. 650 milliGRAM(s) Oral every 6 hours  aMIOdarone    Tablet 400 milliGRAM(s) Oral two times a day  ascorbic acid 500 milliGRAM(s) Oral two times a day  aspirin enteric coated 81 milliGRAM(s) Oral daily  atorvastatin 40 milliGRAM(s) Oral at bedtime  bisacodyl Suppository 10 milliGRAM(s) Rectal once  cefuroxime  IVPB 1500 milliGRAM(s) IV Intermittent every 8 hours  chlorhexidine 2% Cloths 1 Application(s) Topical daily  DOBUTamine Infusion 1.25 MICROgram(s)/kG/Min (3.13 mL/Hr) IV Continuous <Continuous>  enoxaparin Injectable 40 milliGRAM(s) SubCutaneous every 24 hours  gabapentin 100 milliGRAM(s) Oral every 8 hours  insulin regular Infusion 2 Unit(s)/Hr (2 mL/Hr) IV Continuous <Continuous>  metoclopramide Injectable 10 milliGRAM(s) IV Push every 8 hours  pantoprazole    Tablet 40 milliGRAM(s) Oral before breakfast  polyethylene glycol 3350 17 Gram(s) Oral daily  senna 2 Tablet(s) Oral at bedtime  sodium chloride 0.9%. 1000 milliLiter(s) (10 mL/Hr) IV Continuous <Continuous>                                    9.8    7.77  )-----------( 106      ( 29 Sep 2022 00:39 )             27.9       09-29    140  |  105  |  18  ----------------------------<  131<H>  3.9   |  23  |  1.01    Ca    8.7      29 Sep 2022 00:39  Phos  3.4     09-29  Mg     2.0     09-29    TPro  5.3<L>  /  Alb  3.8  /  TBili  4.4<H>  /  DBili  x   /  AST  60<H>  /  ALT  52<H>  /  AlkPhos  32<L>  09-29      PT/INR - ( 28 Sep 2022 01:48 )   PT: 15.1 sec;   INR: 1.30 ratio         PTT - ( 28 Sep 2022 01:48 )  PTT:29.3 sec        Daily     Daily       09-28 @ 07:01  -  09-29 @ 07:00  --------------------------------------------------------  IN: 1532.3 mL / OUT: 2022 mL / NET: -489.7 mL    09-29 @ 07:01  - 09-29 @ 08:02  --------------------------------------------------------  IN: 12 mL / OUT: 125 mL / NET: -113 mL        Critically Ill patient  : [ ] preoperative ,   [x] post operative    Requires :  [x] Arterial Line   [x ] Central Line  [ ] PA catheter  [ ] IABP  [ ] ECMO  [ ] LVAD  [ ] Ventilator  [x] pacemaker-TPM [ ] Impella.                      [x ] ABG's     [x ] Pulse Oxymetry Monitoring  Bedside evaluation , monitoring , treatment of hemodynamics , fluids , IVP/ IVCD meds.        Diagnosis:       POD 2- C3L (was L radial harvest c/b vfib intraop- clot found in graft)    V Fib in OR - clotted graft     CHF- acute [x ]   chronic [ ]    systolic [x ]   diastolic [ ]  Valvular [ ]          - Echo- EF -  40's / V Fib            [ ] RV dysfunction          - Cxr-cardiomegally, edema          - Clinical-  [ x]inotropes   [ ]pressors   [ x]diuresis   [ ]IABP   [ ]ECMO   [ ]LVAD   [ ]Respiratory Failure    -     Hypovolemia     Hemodynamic lability,  instability. Requires IVCD [ ] vasopressors [x] inotropes  [ x] vasodilator  [x]IVSS fluid  to maintain MAP, perfusion, C.I.     Extubated yesterday     Respiratory insuffiencey     Hypoxemia - Requires [ ] BIPAP  [x] HF O2- 50L/70%    Requires chest PT, pulmonary toilet, suctioning to maintain SaO2,  patent airway and treat atelectasis.     Temporary pacemaker (TPM) interrogation and setting.     Requires  [ ]DDD  [x]VVI    [ ]AII  temporary pacing at  45 min    to maintain HR, MAP, CI, and perfusion.     Thrombocytopenia     Chest Tube Drainage     Femoral A line taken out yesterday     IVCD Insulin     Requires bedside physical therapy, mobilization and total prison care.             I, Jose Armando, personally performed the services described in this documentation. All medical record entries made by the scribe were at my direction and in my presence. I have reviewed the chart and agree that the record reflects my personal performance and is accurate and complete.   Jose Armando MD.       By signing my name below, I, Dustin Herbert, attest that this documentation has been prepared under the direction and in the presence of Jose Armando MD.   Electronically Signed: Shayna Sharma 09-29-22 @ 08:02        Discussed with CT surgeon, Physician Assistant - Nurse Practitioner- Critical care medicine team.   Dicussed at  AM / PM rounds.   Chart, labs , films reviewed.    Total Time:  30 min

## 2022-09-30 DIAGNOSIS — Z95.1 PRESENCE OF AORTOCORONARY BYPASS GRAFT: ICD-10-CM

## 2022-09-30 DIAGNOSIS — R74.01 ELEVATION OF LEVELS OF LIVER TRANSAMINASE LEVELS: ICD-10-CM

## 2022-09-30 LAB
ALBUMIN SERPL ELPH-MCNC: 3.8 G/DL — SIGNIFICANT CHANGE UP (ref 3.3–5)
ALP SERPL-CCNC: 56 U/L — SIGNIFICANT CHANGE UP (ref 40–120)
ALT FLD-CCNC: 135 U/L — HIGH (ref 10–45)
ANION GAP SERPL CALC-SCNC: 11 MMOL/L — SIGNIFICANT CHANGE UP (ref 5–17)
AST SERPL-CCNC: 118 U/L — HIGH (ref 10–40)
BILIRUB SERPL-MCNC: 2 MG/DL — HIGH (ref 0.2–1.2)
BUN SERPL-MCNC: 22 MG/DL — SIGNIFICANT CHANGE UP (ref 7–23)
CALCIUM SERPL-MCNC: 8.7 MG/DL — SIGNIFICANT CHANGE UP (ref 8.4–10.5)
CHLORIDE SERPL-SCNC: 100 MMOL/L — SIGNIFICANT CHANGE UP (ref 96–108)
CO2 SERPL-SCNC: 26 MMOL/L — SIGNIFICANT CHANGE UP (ref 22–31)
CREAT SERPL-MCNC: 1.29 MG/DL — SIGNIFICANT CHANGE UP (ref 0.5–1.3)
EGFR: 58 ML/MIN/1.73M2 — LOW
GAS PNL BLDA: SIGNIFICANT CHANGE UP
GLUCOSE BLDC GLUCOMTR-MCNC: 128 MG/DL — HIGH (ref 70–99)
GLUCOSE BLDC GLUCOMTR-MCNC: 144 MG/DL — HIGH (ref 70–99)
GLUCOSE BLDC GLUCOMTR-MCNC: 148 MG/DL — HIGH (ref 70–99)
GLUCOSE BLDC GLUCOMTR-MCNC: 157 MG/DL — HIGH (ref 70–99)
GLUCOSE SERPL-MCNC: 147 MG/DL — HIGH (ref 70–99)
HCT VFR BLD CALC: 29.5 % — LOW (ref 39–50)
HGB BLD-MCNC: 9.9 G/DL — LOW (ref 13–17)
MAGNESIUM SERPL-MCNC: 2.2 MG/DL — SIGNIFICANT CHANGE UP (ref 1.6–2.6)
MCHC RBC-ENTMCNC: 31.3 PG — SIGNIFICANT CHANGE UP (ref 27–34)
MCHC RBC-ENTMCNC: 33.6 GM/DL — SIGNIFICANT CHANGE UP (ref 32–36)
MCV RBC AUTO: 93.4 FL — SIGNIFICANT CHANGE UP (ref 80–100)
NRBC # BLD: 0 /100 WBCS — SIGNIFICANT CHANGE UP (ref 0–0)
PHOSPHATE SERPL-MCNC: 2.9 MG/DL — SIGNIFICANT CHANGE UP (ref 2.5–4.5)
PLATELET # BLD AUTO: 106 K/UL — LOW (ref 150–400)
POTASSIUM SERPL-MCNC: 4.1 MMOL/L — SIGNIFICANT CHANGE UP (ref 3.5–5.3)
POTASSIUM SERPL-SCNC: 4.1 MMOL/L — SIGNIFICANT CHANGE UP (ref 3.5–5.3)
PROT SERPL-MCNC: 5.9 G/DL — LOW (ref 6–8.3)
RBC # BLD: 3.16 M/UL — LOW (ref 4.2–5.8)
RBC # FLD: 11.7 % — SIGNIFICANT CHANGE UP (ref 10.3–14.5)
SODIUM SERPL-SCNC: 137 MMOL/L — SIGNIFICANT CHANGE UP (ref 135–145)
WBC # BLD: 7.61 K/UL — SIGNIFICANT CHANGE UP (ref 3.8–10.5)
WBC # FLD AUTO: 7.61 K/UL — SIGNIFICANT CHANGE UP (ref 3.8–10.5)

## 2022-09-30 PROCEDURE — 96374 THER/PROPH/DIAG INJ IV PUSH: CPT

## 2022-09-30 PROCEDURE — 71045 X-RAY EXAM CHEST 1 VIEW: CPT

## 2022-09-30 PROCEDURE — 83735 ASSAY OF MAGNESIUM: CPT

## 2022-09-30 PROCEDURE — 85025 COMPLETE CBC W/AUTO DIFF WBC: CPT

## 2022-09-30 PROCEDURE — 87635 SARS-COV-2 COVID-19 AMP PRB: CPT

## 2022-09-30 PROCEDURE — 84484 ASSAY OF TROPONIN QUANT: CPT

## 2022-09-30 PROCEDURE — 93005 ELECTROCARDIOGRAM TRACING: CPT

## 2022-09-30 PROCEDURE — 71045 X-RAY EXAM CHEST 1 VIEW: CPT | Mod: 26

## 2022-09-30 PROCEDURE — 36415 COLL VENOUS BLD VENIPUNCTURE: CPT

## 2022-09-30 PROCEDURE — 85610 PROTHROMBIN TIME: CPT

## 2022-09-30 PROCEDURE — 80053 COMPREHEN METABOLIC PANEL: CPT

## 2022-09-30 PROCEDURE — 85730 THROMBOPLASTIN TIME PARTIAL: CPT

## 2022-09-30 PROCEDURE — 99233 SBSQ HOSP IP/OBS HIGH 50: CPT

## 2022-09-30 PROCEDURE — 99291 CRITICAL CARE FIRST HOUR: CPT | Mod: 25

## 2022-09-30 PROCEDURE — 83880 ASSAY OF NATRIURETIC PEPTIDE: CPT

## 2022-09-30 PROCEDURE — 82550 ASSAY OF CK (CPK): CPT

## 2022-09-30 PROCEDURE — 83690 ASSAY OF LIPASE: CPT

## 2022-09-30 RX ORDER — AMLODIPINE BESYLATE 2.5 MG/1
2.5 TABLET ORAL DAILY
Refills: 0 | Status: DISCONTINUED | OUTPATIENT
Start: 2022-09-30 | End: 2022-10-04

## 2022-09-30 RX ORDER — ACETAMINOPHEN 500 MG
1000 TABLET ORAL ONCE
Refills: 0 | Status: COMPLETED | OUTPATIENT
Start: 2022-09-30 | End: 2022-09-30

## 2022-09-30 RX ORDER — FUROSEMIDE 40 MG
20 TABLET ORAL DAILY
Refills: 0 | Status: DISCONTINUED | OUTPATIENT
Start: 2022-09-30 | End: 2022-10-01

## 2022-09-30 RX ORDER — BENZOCAINE AND MENTHOL 5; 1 G/100ML; G/100ML
1 LIQUID ORAL EVERY 6 HOURS
Refills: 0 | Status: DISCONTINUED | OUTPATIENT
Start: 2022-09-30 | End: 2022-10-04

## 2022-09-30 RX ADMIN — CHLORHEXIDINE GLUCONATE 1 APPLICATION(S): 213 SOLUTION TOPICAL at 12:50

## 2022-09-30 RX ADMIN — POLYETHYLENE GLYCOL 3350 17 GRAM(S): 17 POWDER, FOR SOLUTION ORAL at 16:17

## 2022-09-30 RX ADMIN — AMLODIPINE BESYLATE 2.5 MILLIGRAM(S): 2.5 TABLET ORAL at 21:23

## 2022-09-30 RX ADMIN — OXYCODONE HYDROCHLORIDE 10 MILLIGRAM(S): 5 TABLET ORAL at 04:57

## 2022-09-30 RX ADMIN — Medication 1: at 12:52

## 2022-09-30 RX ADMIN — OXYCODONE HYDROCHLORIDE 10 MILLIGRAM(S): 5 TABLET ORAL at 05:00

## 2022-09-30 RX ADMIN — ATORVASTATIN CALCIUM 40 MILLIGRAM(S): 80 TABLET, FILM COATED ORAL at 21:22

## 2022-09-30 RX ADMIN — Medication 400 MILLIGRAM(S): at 01:45

## 2022-09-30 RX ADMIN — SENNA PLUS 2 TABLET(S): 8.6 TABLET ORAL at 21:22

## 2022-09-30 RX ADMIN — Medication 1000 MILLIGRAM(S): at 02:00

## 2022-09-30 RX ADMIN — FINASTERIDE 5 MILLIGRAM(S): 5 TABLET, FILM COATED ORAL at 12:52

## 2022-09-30 RX ADMIN — TAMSULOSIN HYDROCHLORIDE 0.4 MILLIGRAM(S): 0.4 CAPSULE ORAL at 21:22

## 2022-09-30 RX ADMIN — Medication 25 MILLIGRAM(S): at 05:30

## 2022-09-30 RX ADMIN — PANTOPRAZOLE SODIUM 40 MILLIGRAM(S): 20 TABLET, DELAYED RELEASE ORAL at 05:30

## 2022-09-30 RX ADMIN — Medication 500 MILLIGRAM(S): at 05:30

## 2022-09-30 RX ADMIN — Medication 20 MILLIGRAM(S): at 09:01

## 2022-09-30 RX ADMIN — Medication 81 MILLIGRAM(S): at 12:52

## 2022-09-30 RX ADMIN — ENOXAPARIN SODIUM 40 MILLIGRAM(S): 100 INJECTION SUBCUTANEOUS at 17:48

## 2022-09-30 RX ADMIN — Medication 25 MILLIGRAM(S): at 17:48

## 2022-09-30 RX ADMIN — Medication 500 MILLIGRAM(S): at 17:48

## 2022-09-30 NOTE — PROGRESS NOTE ADULT - PROBLEM SELECTOR PLAN 3
continue asa/ bb/ statin and hep gttp  preop work up in progress  Covid pending NPO after MN  OR planned for tue 9/27 with Dr. Ford continue postop care  continue asa and bb   no statin at this time secondary to transaminitis  start norvasc for radial graft and htn  d/c [place]/ rij/ still and rt ct in am if output minimal and pt stable  bowel regimen  diuresis  pulm toilet  pain management  increase activity as tolerated  Discharge planning- home pt when stable

## 2022-09-30 NOTE — PROGRESS NOTE ADULT - PROBLEM SELECTOR PLAN 2
continue lop 12.5 bid  continue norvasc 2.5 qd  monitor vs continue lop 25 bid   continue diuresis   start norvasc 2.5 qd  monitor vs

## 2022-09-30 NOTE — PROGRESS NOTE ADULT - SUBJECTIVE AND OBJECTIVE BOX
HPI:  Patient is a 72y Male with PMH of BPH, HTN, presented to OSH with chest pain x1 day, transferred to [facility] for NSTEMI. History obtained from patient using  (PI #[id number]). Patient was woken up last night around midnight with sudden-onset left-sided chest pain. The pain did not radiate anywhere else, was not associated with SOB or diaphoresis. He did have some mild dizziness. At OSH he was loaded with aspirin and plavix and received nitroglycerin x2, chest pain is now resolved. Patient denies fever, chills, abdominal pain, nausea, vomiting, changes in bowel habits, or urinary symptoms. He has no personal or family history of heart problems, never smoked cigarettes.     In the ED, afebrile, VSS. Labs significant for trop 618. CXR shows mild atelectasis at the lung bases b/l. Started on heparin gtt. (22 Sep 2022 23:26)      Patient seen and examined at the bedside.    Remained critically ill on continuous ICU monitoring.    OBJECTIVE:  Vital Signs Last 24 Hrs  T(C): 38 (30 Sep 2022 08:00), Max: 38 (30 Sep 2022 08:00)  T(F): 100.4 (30 Sep 2022 08:00), Max: 100.4 (30 Sep 2022 08:00)  HR: 72 (30 Sep 2022 09:00) (62 - 84)  BP: 104/59 (30 Sep 2022 08:00) (104/59 - 104/59)  BP(mean): 76 (30 Sep 2022 08:00) (76 - 76)  RR: 16 (30 Sep 2022 09:00) (10 - 30)  SpO2: 92% (30 Sep 2022 09:00) (91% - 100%)    Parameters below as of 30 Sep 2022 08:50  Patient On (Oxygen Delivery Method): nasal cannula, high flow  O2 Flow (L/min): 50  O2 Concentration (%): 50    Physical Exam:   General: NAD  Neurology: nonfocal  Eyes: bilateral pupils equal and reactive   ENT/Neck: Neck supple, trachea midline, No JVD   Respiratory: Clear bilaterally   CV: S1S2, no murmurs        [x] Sternal dressing, [x] Mediastinal CT [x] R Pleural CT [x] L Pleural CT        [x] Sinus rhythm, [x] Temporary pacing - VVI @45  Abdominal: Soft, NT, ND +BS   Extremities: 1-2+ pedal edema noted, + peripheral pulses   Skin: No Rashes, Hematoma, Ecchymosis                            Assessment:  Patient is a 72y Male with PMH of BPH, HTN, presented to OSH with chest pain x1 day, transferred to [facility] for NSTEMI. History obtained from patient using  (PI #[id number]). Patient was woken up last night around midnight with sudden-onset left-sided chest pain. The pain did not radiate anywhere else, was not associated with SOB or diaphoresis. He did have some mild dizziness. At OSH he was loaded with aspirin and plavix and received nitroglycerin x2, chest pain is now resolved. Patient denies fever, chills, abdominal pain, nausea, vomiting, changes in bowel habits, or urinary symptoms. He has no personal or family history of heart problems, never smoked cigarettes.     CAD s/p CABG 09/27/2022  acute systolic CHF  hemodynamic instability  Stress hyperglycemia   Thrombocytopenia  Hypoxic resp failure          Plan:   ***Neuro***  Addressed analgesic regimen to optimize function      ***Cardiovascular***  72 year old male with CAD status post CABG. Invasive hemodynamic monitoring with a central venous catheter & an A-line were required for the continuous central venous and MAP/BP monitoring to ensure adequate cardiovascular support. ASA continued for graft occlusion-thromboembolism prophylaxis. Lipitor was also continued for long term graft patency. Temporary back up pacing wires in place at VVI 45. Patient is on Lopressor for rate control      ***Pulmonary***  Respiratory status required supplemental oxygen on High Flow Nasal Cannula 50%/50L, close monitoring of breathing pattern and respiratory rate & the following of continuous pulse oximetry for support & to prevent decompensation     ***Heme***  Thrombocytopenia and Anemia due to acute blood loss, no current plan for transfusion. Continue to monitor hemoglobin and hematocrit levels. Lovenox continued for venous thromboembolism prophylaxis in addition to sequential compression devices        ***GI***  Protonix for stress ulcer prophylaxis. Continue bowel regimen with Miralax and senna. Tolerating PO consistent carb diet.         ***Renal***  Diuresis with Lasix . Continue monitoring urine output, fluid balance, electrolytes, and BUN/Creatinine to avoid intravascular volume depletion. Flomax and Finasteride for Urinary retention.     ***ID***  Afebrile, white blood cells within normal limits  Continue trending white blood count and monitoring fever curve.       ***Endocrine***  Metabolic stability, stress hyperglycemia required review and adjustment of regular Insulin sliding scale and glycemic regimen while following serial glucose levels to help achieve and maintain euglycemia           Patient requires continuous monitoring with bedside rhythm monitoring, pulse oximetry monitoring, and continuous central venous and arterial pressure monitoring; and intermittent blood gas analysis. Care plan discussed with the ICU care team.   Patient remained critical, at risk for life threatening decompensation.    I have spent 30 minutes providing critical care management to this patient.    By signing my name below, I, Dustin Herbert, attest that this documentation has been prepared under the direction and in the presence of Vanessa Abrams MD   Electronically signed: Shayna Sharma, 09-30-22 @ 09:49    I, Vanessa Abrams, personally performed the services described in this documentation. all medical record entries made by the scribe were at my direction and in my presence. I have reviewed the chart and agree that the record reflects my personal performance and is accurate and complete  Electronically signed: Vanessa Abrams MD    HPI:  Patient is a 72y Male with PMH of BPH, HTN, presented to OSH with chest pain x1 day, transferred to [facility] for NSTEMI. History obtained from patient using  (PI #[id number]). Patient was woken up last night around midnight with sudden-onset left-sided chest pain. The pain did not radiate anywhere else, was not associated with SOB or diaphoresis. He did have some mild dizziness. At OSH he was loaded with aspirin and plavix and received nitroglycerin x2, chest pain is now resolved. Patient denies fever, chills, abdominal pain, nausea, vomiting, changes in bowel habits, or urinary symptoms. He has no personal or family history of heart problems, never smoked cigarettes.     In the ED, afebrile, VSS. Labs significant for trop 618. CXR shows mild atelectasis at the lung bases b/l. Started on heparin gtt. (22 Sep 2022 23:26)      Patient seen and examined at the bedside.    Remained critically ill on continuous ICU monitoring.    OBJECTIVE:  Vital Signs Last 24 Hrs  T(C): 38 (30 Sep 2022 08:00), Max: 38 (30 Sep 2022 08:00)  T(F): 100.4 (30 Sep 2022 08:00), Max: 100.4 (30 Sep 2022 08:00)  HR: 72 (30 Sep 2022 09:00) (62 - 84)  BP: 104/59 (30 Sep 2022 08:00) (104/59 - 104/59)  BP(mean): 76 (30 Sep 2022 08:00) (76 - 76)  RR: 16 (30 Sep 2022 09:00) (10 - 30)  SpO2: 92% (30 Sep 2022 09:00) (91% - 100%)    Parameters below as of 30 Sep 2022 08:50  Patient On (Oxygen Delivery Method): nasal cannula, high flow  O2 Flow (L/min): 50  O2 Concentration (%): 50    Physical Exam:   General: awake, but appears weak and tired, OOB to chair  Neurology: nonfocal  Eyes: bilateral pupils equal and reactive   ENT/Neck: Neck supple, trachea midline, No JVD   Respiratory: Clear bilaterally   CV: S1S2, no murmurs        [x] Sternal dressing, [x] Mediastinal CT [x] R Pleural CT [x] L Pleural CT        [x] Sinus rhythm, [x] Temporary pacing - VVI @45  Abdominal: Soft, NT, ND +BS   Extremities: 1+ pedal edema noted, + peripheral pulses   Skin: No Rashes, Hematoma, Ecchymosis                            Assessment:  Patient is a 72y Male with PMH of BPH, HTN, presented to OSH with chest pain x1 day, transferred to [facility] for NSTEMI. History obtained from patient using  (PI #[id number]). Patient was woken up last night around midnight with sudden-onset left-sided chest pain. The pain did not radiate anywhere else, was not associated with SOB or diaphoresis. He did have some mild dizziness. At OSH he was loaded with aspirin and plavix and received nitroglycerin x2, chest pain is now resolved. Patient denies fever, chills, abdominal pain, nausea, vomiting, changes in bowel habits, or urinary symptoms. He has no personal or family history of heart problems, never smoked cigarettes.     CAD s/p CABG 09/27/2022  acute systolic CHF  hemodynamic instability  Stress hyperglycemia   Thrombocytopenia  Hypoxic resp failure          Plan:   ***Neuro***  Addressed analgesic regimen to optimize function      ***Cardiovascular***  72 year old male with CAD status post C3L 9/17, intraoperative Vfib, found to be due to plaque in the radial artery graft, requiring placement of an additional vein graft. Invasive hemodynamic monitoring with a central venous catheter & an A-line were required for the continuous central venous and MAP/BP monitoring to ensure adequate cardiovascular support. Lines now removed for transfer to step down unit. Lopressor initiated for hypertension and atrial fibrillation prophylaxis. ASA continued for graft occlusion-thromboembolism prophylaxis. Lipitor was also continued for long term graft patency. Temporary back up pacing wires in place at VVI 45.      ***Pulmonary***  Respiratory status required supplemental oxygen on High Flow Nasal Cannula 50%/50L, close monitoring of breathing pattern and respiratory rate & the following of continuous pulse oximetry for support & to prevent decompensation. Hypoxia in part may be due to atelectasis as well as some pulmonary congestion. Continue mobilization and deep breathing as well as once daily Lasix.     ***Heme***  Anemia due to acute blood loss, no current plan for transfusion. Continue to monitor hemoglobin and hematocrit levels. Lovenox continued for venous thromboembolism prophylaxis in addition to sequential compression devices        ***GI***  Protonix for stress ulcer prophylaxis. Continue bowel regimen with Miralax and senna. Tolerating PO consistent carb diet.         ***Renal***  Continue once daily lasix with continued monitoring urine output, fluid balance, electrolytes, and BUN/Creatinine to avoid intravascular volume depletion. Flomax and Finasteride for BPH    ***ID***  Afebrile, white blood cells within normal limits  Continue trending white blood count and monitoring fever curve.       ***Endocrine***  Metabolic stability, stress hyperglycemia required review and adjustment of regular Insulin sliding scale and glycemic regimen while following serial glucose levels to help achieve and maintain euglycemia           Patient requires ongoing monitoring with bedside rhythm and pulse oximetry. Patient now cleared for transfer to the step down unit. Care plan discussed with the ICU care team.   Patient remained critical, at risk for life threatening decompensation.      By signing my name below, I, Dustin Herbert, attest that this documentation has been prepared under the direction and in the presence of Vanessa Abrams MD   Electronically signed: Artur Sharma, 09-30-22 @ 09:49    I, Vanessa Abrams, personally performed the services described in this documentation. all medical record entries made by the artur were at my direction and in my presence. I have reviewed the chart and agree that the record reflects my personal performance and is accurate and complete  Electronically signed: Vanessa Abrams MD    HPI:  Patient is a 72y Male with PMH of BPH, HTN, presented to OSH with chest pain x1 day, transferred to [facility] for NSTEMI. History obtained from patient using  (PI #[id number]). Patient was woken up last night around midnight with sudden-onset left-sided chest pain. The pain did not radiate anywhere else, was not associated with SOB or diaphoresis. He did have some mild dizziness. At OSH he was loaded with aspirin and plavix and received nitroglycerin x2, chest pain is now resolved. Patient denies fever, chills, abdominal pain, nausea, vomiting, changes in bowel habits, or urinary symptoms. He has no personal or family history of heart problems, never smoked cigarettes.     In the ED, afebrile, VSS. Labs significant for trop 618. CXR shows mild atelectasis at the lung bases b/l. Started on heparin gtt. (22 Sep 2022 23:26)      Patient seen and examined at the bedside.    Remained critically ill on continuous ICU monitoring.    OBJECTIVE:  Vital Signs Last 24 Hrs  T(C): 38 (30 Sep 2022 08:00), Max: 38 (30 Sep 2022 08:00)  T(F): 100.4 (30 Sep 2022 08:00), Max: 100.4 (30 Sep 2022 08:00)  HR: 72 (30 Sep 2022 09:00) (62 - 84)  BP: 104/59 (30 Sep 2022 08:00) (104/59 - 104/59)  BP(mean): 76 (30 Sep 2022 08:00) (76 - 76)  RR: 16 (30 Sep 2022 09:00) (10 - 30)  SpO2: 92% (30 Sep 2022 09:00) (91% - 100%)    Parameters below as of 30 Sep 2022 08:50  Patient On (Oxygen Delivery Method): nasal cannula, high flow  O2 Flow (L/min): 50  O2 Concentration (%): 50    Physical Exam:   General: awake, but appears weak and tired, OOB to chair  Neurology: nonfocal  Eyes: bilateral pupils equal and reactive   ENT/Neck: Neck supple, trachea midline, No JVD   Respiratory: Clear bilaterally   CV: S1S2, no murmurs        [x] Sternal dressing, [x] Mediastinal CT [x] R Pleural CT [x] L Pleural CT        [x] Sinus rhythm, [x] Temporary pacing - VVI @45  Abdominal: Soft, NT, ND +BS   Extremities: 1+ pedal edema noted, + peripheral pulses   Skin: No Rashes, Hematoma, Ecchymosis                            Assessment:  Patient is a 72y Male with PMH of BPH, HTN, presented to OSH with chest pain x1 day, transferred to [facility] for NSTEMI. History obtained from patient using  (PI #[id number]). Patient was woken up last night around midnight with sudden-onset left-sided chest pain. The pain did not radiate anywhere else, was not associated with SOB or diaphoresis. He did have some mild dizziness. At OSH he was loaded with aspirin and plavix and received nitroglycerin x2, chest pain is now resolved. Patient denies fever, chills, abdominal pain, nausea, vomiting, changes in bowel habits, or urinary symptoms. He has no personal or family history of heart problems, never smoked cigarettes.     CAD s/p CABG 09/27/2022  acute systolic CHF  hemodynamic instability  Stress hyperglycemia   Thrombocytopenia  Hypoxic resp failure          Plan:   ***Neuro***  Addressed analgesic regimen to optimize function      ***Cardiovascular***  72 year old male with CAD status post C3L 9/17, intraoperative Vfib, found to be due to plaque in the radial artery graft, requiring placement of an additional vein graft. Invasive hemodynamic monitoring with a central venous catheter & an A-line were required for the continuous central venous and MAP/BP monitoring to ensure adequate cardiovascular support. Lines now removed for transfer to step down unit. Lopressor initiated for hypertension and atrial fibrillation prophylaxis. ASA continued for graft occlusion-thromboembolism prophylaxis. Lipitor was also continued for long term graft patency. Temporary back up pacing wires in place at VVI 45.      ***Pulmonary***  Respiratory status required supplemental oxygen on High Flow Nasal Cannula 50%/50L, close monitoring of breathing pattern and respiratory rate & the following of continuous pulse oximetry for support & to prevent decompensation. Hypoxia in part may be due to atelectasis as well as some pulmonary congestion. Continue mobilization and deep breathing as well as once daily Lasix.     ***Heme***  Anemia due to acute blood loss, no current plan for transfusion. Continue to monitor hemoglobin and hematocrit levels. Lovenox continued for venous thromboembolism prophylaxis in addition to sequential compression devices        ***GI***  Mild transaminitis, likely post hypoperfusion. Amiodarone and tylenol discontinued. Protonix for stress ulcer prophylaxis. Continue bowel regimen with Miralax and senna. Tolerating PO consistent carb diet.         ***Renal***  Continue once daily lasix with continued monitoring urine output, fluid balance, electrolytes, and BUN/Creatinine to avoid intravascular volume depletion. Flomax and Finasteride for BPH    ***ID***  Afebrile, white blood cells within normal limits  Continue trending white blood count and monitoring fever curve.       ***Endocrine***  Metabolic stability, stress hyperglycemia required review and adjustment of regular Insulin sliding scale and glycemic regimen while following serial glucose levels to help achieve and maintain euglycemia           Patient requires ongoing monitoring with bedside rhythm and pulse oximetry. Patient now cleared for transfer to the step down unit. Care plan discussed with the ICU care team.   Patient remained critical, at risk for life threatening decompensation.      By signing my name below, I, Dustin Herbert, attest that this documentation has been prepared under the direction and in the presence of Vanessa Abrams MD   Electronically signed: Artur Sharma, 09-30-22 @ 09:49    I, Vanessa Abrams, personally performed the services described in this documentation. all medical record entries made by the artur were at my direction and in my presence. I have reviewed the chart and agree that the record reflects my personal performance and is accurate and complete  Electronically signed: Vanessa Abrams MD

## 2022-09-30 NOTE — PROGRESS NOTE ADULT - SUBJECTIVE AND OBJECTIVE BOX
VITAL SIGNS    Telemetry:    Vital Signs Last 24 Hrs  T(C): 37.1 (09-30-22 @ 12:02), Max: 38 (09-30-22 @ 08:00)  T(F): 98.8 (09-30-22 @ 12:02), Max: 100.4 (09-30-22 @ 08:00)  HR: 83 (09-30-22 @ 12:38) (62 - 84)  BP: 135/67 (09-30-22 @ 12:02) (104/59 - 135/67)  RR: 20 (09-30-22 @ 12:38) (10 - 30)  SpO2: 92% (09-30-22 @ 12:38) (91% - 98%)            09-29 @ 07:01  -  09-30 @ 07:00  --------------------------------------------------------  IN: 469 mL / OUT: 2940 mL / NET: -2471 mL    09-30 @ 07:01  -  09-30 @ 12:43  --------------------------------------------------------  IN: 280 mL / OUT: 185 mL / NET: 95 mL       Daily     Daily   Admit Wt: Drug Dosing Weight  Height (cm): 180.3 (27 Sep 2022 13:04)  Weight (kg): 83.5 (27 Sep 2022 13:04)  BMI (kg/m2): 25.7 (27 Sep 2022 13:04)  BSA (m2): 2.04 (27 Sep 2022 13:04)    Bilirubin Total, Serum: 2.0 mg/dL (09-30 @ 01:05)    CAPILLARY BLOOD GLUCOSE  141 (29 Sep 2022 16:00)  141 (29 Sep 2022 15:00)  131 (29 Sep 2022 14:00)  156 (29 Sep 2022 13:00)      POCT Blood Glucose.: 157 mg/dL (30 Sep 2022 11:36)  POCT Blood Glucose.: 144 mg/dL (30 Sep 2022 06:45)  POCT Blood Glucose.: 146 mg/dL (29 Sep 2022 23:57)  POCT Blood Glucose.: 143 mg/dL (29 Sep 2022 23:16)  POCT Blood Glucose.: 133 mg/dL (29 Sep 2022 16:52)  POCT Blood Glucose.: 141 mg/dL (29 Sep 2022 15:59)  POCT Blood Glucose.: 141 mg/dL (29 Sep 2022 14:55)  POCT Blood Glucose.: 131 mg/dL (29 Sep 2022 13:53)  POCT Blood Glucose.: 156 mg/dL (29 Sep 2022 13:05)          ascorbic acid 500 milliGRAM(s) Oral two times a day  aspirin enteric coated 81 milliGRAM(s) Oral daily  atorvastatin 40 milliGRAM(s) Oral at bedtime  bisacodyl Suppository 10 milliGRAM(s) Rectal once  chlorhexidine 2% Cloths 1 Application(s) Topical daily  enoxaparin Injectable 40 milliGRAM(s) SubCutaneous every 24 hours  finasteride 5 milliGRAM(s) Oral daily  furosemide    Tablet 20 milliGRAM(s) Oral daily  insulin lispro (ADMELOG) corrective regimen sliding scale   SubCutaneous three times a day before meals  insulin lispro (ADMELOG) corrective regimen sliding scale   SubCutaneous at bedtime  metoprolol tartrate 25 milliGRAM(s) Oral two times a day  oxyCODONE    IR 5 milliGRAM(s) Oral every 4 hours PRN  oxyCODONE    IR 10 milliGRAM(s) Oral every 4 hours PRN  pantoprazole    Tablet 40 milliGRAM(s) Oral before breakfast  polyethylene glycol 3350 17 Gram(s) Oral daily  senna 2 Tablet(s) Oral at bedtime  sodium chloride 0.9%. 1000 milliLiter(s) IV Continuous <Continuous>  tamsulosin 0.4 milliGRAM(s) Oral at bedtime      PHYSICAL EXAM    Subjective: "Hi.   Neurology: alert and oriented x 3, nonfocal, no gross deficits  CV : tele:  RSR  Sternal Wound :  CDI with dressing , Stable  Lungs: clear. RR easy, unlabored   Abdomen: soft, nontender, nondistended, positive bowel sounds, bowel movement   Neg N/V/D   :  pt voiding without difficulty   Extremities:   MOISE; edema, neg calf tenderness.   PPP bilaterally      PW:  Chest tubes:                 VITAL SIGNS    Telemetry:  rsr 60-70   Vital Signs Last 24 Hrs  T(C): 37.1 (09-30-22 @ 12:02), Max: 38 (09-30-22 @ 08:00)  T(F): 98.8 (09-30-22 @ 12:02), Max: 100.4 (09-30-22 @ 08:00)  HR: 83 (09-30-22 @ 12:38) (62 - 84)  BP: 135/67 (09-30-22 @ 12:02) (104/59 - 135/67)  RR: 20 (09-30-22 @ 12:38) (10 - 30)  SpO2: 92% (09-30-22 @ 12:38) (91% - 98%)            09-29 @ 07:01  -  09-30 @ 07:00  --------------------------------------------------------  IN: 469 mL / OUT: 2940 mL / NET: -2471 mL    09-30 @ 07:01  -  09-30 @ 12:43  --------------------------------------------------------  IN: 280 mL / OUT: 185 mL / NET: 95 mL       Daily     Daily   Admit Wt: Drug Dosing Weight  Height (cm): 180.3 (27 Sep 2022 13:04)  Weight (kg): 83.5 (27 Sep 2022 13:04)  BMI (kg/m2): 25.7 (27 Sep 2022 13:04)  BSA (m2): 2.04 (27 Sep 2022 13:04)    Bilirubin Total, Serum: 2.0 mg/dL (09-30 @ 01:05)    CAPILLARY BLOOD GLUCOSE  141 (29 Sep 2022 16:00)  141 (29 Sep 2022 15:00)  131 (29 Sep 2022 14:00)  156 (29 Sep 2022 13:00)      POCT Blood Glucose.: 157 mg/dL (30 Sep 2022 11:36)  POCT Blood Glucose.: 144 mg/dL (30 Sep 2022 06:45)  POCT Blood Glucose.: 146 mg/dL (29 Sep 2022 23:57)  POCT Blood Glucose.: 143 mg/dL (29 Sep 2022 23:16)  POCT Blood Glucose.: 133 mg/dL (29 Sep 2022 16:52)  POCT Blood Glucose.: 141 mg/dL (29 Sep 2022 15:59)  POCT Blood Glucose.: 141 mg/dL (29 Sep 2022 14:55)  POCT Blood Glucose.: 131 mg/dL (29 Sep 2022 13:53)  POCT Blood Glucose.: 156 mg/dL (29 Sep 2022 13:05)          ascorbic acid 500 milliGRAM(s) Oral two times a day  aspirin enteric coated 81 milliGRAM(s) Oral daily  atorvastatin 40 milliGRAM(s) Oral at bedtime  bisacodyl Suppository 10 milliGRAM(s) Rectal once  chlorhexidine 2% Cloths 1 Application(s) Topical daily  enoxaparin Injectable 40 milliGRAM(s) SubCutaneous every 24 hours  finasteride 5 milliGRAM(s) Oral daily  furosemide    Tablet 20 milliGRAM(s) Oral daily  insulin lispro (ADMELOG) corrective regimen sliding scale   SubCutaneous three times a day before meals  insulin lispro (ADMELOG) corrective regimen sliding scale   SubCutaneous at bedtime  metoprolol tartrate 25 milliGRAM(s) Oral two times a day  oxyCODONE    IR 5 milliGRAM(s) Oral every 4 hours PRN  oxyCODONE    IR 10 milliGRAM(s) Oral every 4 hours PRN  pantoprazole    Tablet 40 milliGRAM(s) Oral before breakfast  polyethylene glycol 3350 17 Gram(s) Oral daily  senna 2 Tablet(s) Oral at bedtime  sodium chloride 0.9%. 1000 milliLiter(s) IV Continuous <Continuous>  tamsulosin 0.4 milliGRAM(s) Oral at bedtime      PHYSICAL EXAM    Subjective: "Hi.   Neurology: alert and oriented x 3, nonfocal, no gross deficits  CV : tele:  RSR  Sternal Wound :  CDI with dressing , Stable  Lungs: clear. RR easy, unlabored   Abdomen: soft, nontender, nondistended, positive bowel sounds, bowel movement   Neg N/V/D   :  pt voiding without difficulty   Extremities:   MOISE; edema, neg calf tenderness.   PPP bilaterally      PW:  Chest tubes:                 VITAL SIGNS    Telemetry:  rsr 60-70   Vital Signs Last 24 Hrs  T(C): 37.1 (09-30-22 @ 12:02), Max: 38 (09-30-22 @ 08:00)  T(F): 98.8 (09-30-22 @ 12:02), Max: 100.4 (09-30-22 @ 08:00)  HR: 83 (09-30-22 @ 12:38) (62 - 84)  BP: 135/67 (09-30-22 @ 12:02) (104/59 - 135/67)  RR: 20 (09-30-22 @ 12:38) (10 - 30)  SpO2: 92% (09-30-22 @ 12:38) (91% - 98%)            09-29 @ 07:01  -  09-30 @ 07:00  --------------------------------------------------------  IN: 469 mL / OUT: 2940 mL / NET: -2471 mL    09-30 @ 07:01  -  09-30 @ 12:43  --------------------------------------------------------  IN: 280 mL / OUT: 185 mL / NET: 95 mL       Daily     Daily   Admit Wt: Drug Dosing Weight  Height (cm): 180.3 (27 Sep 2022 13:04)  Weight (kg): 83.5 (27 Sep 2022 13:04)  BMI (kg/m2): 25.7 (27 Sep 2022 13:04)  BSA (m2): 2.04 (27 Sep 2022 13:04)    Bilirubin Total, Serum: 2.0 mg/dL (09-30 @ 01:05)    CAPILLARY BLOOD GLUCOSE  141 (29 Sep 2022 16:00)  141 (29 Sep 2022 15:00)  131 (29 Sep 2022 14:00)  156 (29 Sep 2022 13:00)      POCT Blood Glucose.: 157 mg/dL (30 Sep 2022 11:36)  POCT Blood Glucose.: 144 mg/dL (30 Sep 2022 06:45)  POCT Blood Glucose.: 146 mg/dL (29 Sep 2022 23:57)  POCT Blood Glucose.: 143 mg/dL (29 Sep 2022 23:16)  POCT Blood Glucose.: 133 mg/dL (29 Sep 2022 16:52)  POCT Blood Glucose.: 141 mg/dL (29 Sep 2022 15:59)  POCT Blood Glucose.: 141 mg/dL (29 Sep 2022 14:55)  POCT Blood Glucose.: 131 mg/dL (29 Sep 2022 13:53)  POCT Blood Glucose.: 156 mg/dL (29 Sep 2022 13:05)          ascorbic acid 500 milliGRAM(s) Oral two times a day  aspirin enteric coated 81 milliGRAM(s) Oral daily  atorvastatin 40 milliGRAM(s) Oral at bedtime  bisacodyl Suppository 10 milliGRAM(s) Rectal once  chlorhexidine 2% Cloths 1 Application(s) Topical daily  enoxaparin Injectable 40 milliGRAM(s) SubCutaneous every 24 hours  finasteride 5 milliGRAM(s) Oral daily  furosemide    Tablet 20 milliGRAM(s) Oral daily  insulin lispro (ADMELOG) corrective regimen sliding scale   SubCutaneous three times a day before meals  insulin lispro (ADMELOG) corrective regimen sliding scale   SubCutaneous at bedtime  metoprolol tartrate 25 milliGRAM(s) Oral two times a day  oxyCODONE    IR 5 milliGRAM(s) Oral every 4 hours PRN  oxyCODONE    IR 10 milliGRAM(s) Oral every 4 hours PRN  pantoprazole    Tablet 40 milliGRAM(s) Oral before breakfast  polyethylene glycol 3350 17 Gram(s) Oral daily  senna 2 Tablet(s) Oral at bedtime  sodium chloride 0.9%. 1000 milliLiter(s) IV Continuous <Continuous>  tamsulosin 0.4 milliGRAM(s) Oral at bedtime      PHYSICAL EXAM    Subjective: "Martínez."  Neurology: alert and oriented x 3, nonfocal, no gross deficits  CV : tele:  RSR 60-70   +rij cdi   Sternal Wound :  CDI with dressing , Stable  Lungs: + rhonchi bilaterally; RR easy, unlabored - on 50% hi flow o2   Abdomen: soft, nontender, nondistended, positive bowel sounds, + bowel movement   Neg N/V/D   :  + still--> clear, yellow urine   Extremities:   MOISE; +1 LE edema, neg calf tenderness.   PPP bilaterally; rt svg site cdi w/ dressing; left radial site cdi w/ dressing & ace wrap  rt radial torito cdi       PW: + pw VVI 40 /10  Chest tubes: rt pleural ct draining minimal serous- sang drainage

## 2022-09-30 NOTE — PROGRESS NOTE ADULT - ASSESSMENT
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   9/24 VSS: continue asa/ bb/ statin and hep gttp  9/27 s/p C3L/ LEFT RADIAL  postop hiflow o2  9/30 tx sdu  71yo man with BPH, admitted with NSTEMI, s/p [facility] with multivessel CAD, undergoing CABG evaluation.   9/24 VSS: continue asa/ bb/ statin and hep gttp  9/27 s/p C3L/ LEFT RADIAL  postop inotropic and pressor support weaned off  cardene gttp for htn  9/28 extubated   postop hiflow o2 for postop hypoxia  + transaminitis  and --> amio erp and statin d/c; trend lft's   9/30 Tx sdu; VSS; RSR 60-70; pulm toilet - low grade temp 100.3 noted  d/c rij/ torito/ rt ct in am if pt stable and output minimal  discharge planning- home pt when stable

## 2022-09-30 NOTE — PROGRESS NOTE ADULT - PROBLEM SELECTOR PLAN 1
continue flomax   monitor I & O's  trend bmp continue flomax and proscar   monitor I & O's  trend bmp

## 2022-10-01 LAB
ALBUMIN SERPL ELPH-MCNC: 3.6 G/DL — SIGNIFICANT CHANGE UP (ref 3.3–5)
ALP SERPL-CCNC: 84 U/L — SIGNIFICANT CHANGE UP (ref 40–120)
ALT FLD-CCNC: 103 U/L — HIGH (ref 10–45)
ANION GAP SERPL CALC-SCNC: 9 MMOL/L — SIGNIFICANT CHANGE UP (ref 5–17)
AST SERPL-CCNC: 52 U/L — HIGH (ref 10–40)
BILIRUB SERPL-MCNC: 1.1 MG/DL — SIGNIFICANT CHANGE UP (ref 0.2–1.2)
BUN SERPL-MCNC: 23 MG/DL — SIGNIFICANT CHANGE UP (ref 7–23)
CALCIUM SERPL-MCNC: 8.6 MG/DL — SIGNIFICANT CHANGE UP (ref 8.4–10.5)
CHLORIDE SERPL-SCNC: 102 MMOL/L — SIGNIFICANT CHANGE UP (ref 96–108)
CO2 SERPL-SCNC: 29 MMOL/L — SIGNIFICANT CHANGE UP (ref 22–31)
CREAT SERPL-MCNC: 0.9 MG/DL — SIGNIFICANT CHANGE UP (ref 0.5–1.3)
EGFR: 90 ML/MIN/1.73M2 — SIGNIFICANT CHANGE UP
GLUCOSE BLDC GLUCOMTR-MCNC: 113 MG/DL — HIGH (ref 70–99)
GLUCOSE BLDC GLUCOMTR-MCNC: 122 MG/DL — HIGH (ref 70–99)
GLUCOSE BLDC GLUCOMTR-MCNC: 129 MG/DL — HIGH (ref 70–99)
GLUCOSE BLDC GLUCOMTR-MCNC: 149 MG/DL — HIGH (ref 70–99)
GLUCOSE SERPL-MCNC: 131 MG/DL — HIGH (ref 70–99)
HCT VFR BLD CALC: 28.9 % — LOW (ref 39–50)
HGB BLD-MCNC: 9.8 G/DL — LOW (ref 13–17)
MCHC RBC-ENTMCNC: 31.8 PG — SIGNIFICANT CHANGE UP (ref 27–34)
MCHC RBC-ENTMCNC: 33.9 GM/DL — SIGNIFICANT CHANGE UP (ref 32–36)
MCV RBC AUTO: 93.8 FL — SIGNIFICANT CHANGE UP (ref 80–100)
NRBC # BLD: 0 /100 WBCS — SIGNIFICANT CHANGE UP (ref 0–0)
PLATELET # BLD AUTO: 132 K/UL — LOW (ref 150–400)
POTASSIUM SERPL-MCNC: 3.8 MMOL/L — SIGNIFICANT CHANGE UP (ref 3.5–5.3)
POTASSIUM SERPL-SCNC: 3.8 MMOL/L — SIGNIFICANT CHANGE UP (ref 3.5–5.3)
PROT SERPL-MCNC: 5.8 G/DL — LOW (ref 6–8.3)
RBC # BLD: 3.08 M/UL — LOW (ref 4.2–5.8)
RBC # FLD: 11.4 % — SIGNIFICANT CHANGE UP (ref 10.3–14.5)
SODIUM SERPL-SCNC: 140 MMOL/L — SIGNIFICANT CHANGE UP (ref 135–145)
WBC # BLD: 7.15 K/UL — SIGNIFICANT CHANGE UP (ref 3.8–10.5)
WBC # FLD AUTO: 7.15 K/UL — SIGNIFICANT CHANGE UP (ref 3.8–10.5)

## 2022-10-01 PROCEDURE — 71045 X-RAY EXAM CHEST 1 VIEW: CPT | Mod: 26

## 2022-10-01 RX ORDER — BUDESONIDE, MICRONIZED 100 %
0.5 POWDER (GRAM) MISCELLANEOUS
Refills: 0 | Status: DISCONTINUED | OUTPATIENT
Start: 2022-10-01 | End: 2022-10-04

## 2022-10-01 RX ORDER — FUROSEMIDE 40 MG
20 TABLET ORAL ONCE
Refills: 0 | Status: COMPLETED | OUTPATIENT
Start: 2022-10-01 | End: 2022-10-01

## 2022-10-01 RX ORDER — POTASSIUM CHLORIDE 20 MEQ
20 PACKET (EA) ORAL ONCE
Refills: 0 | Status: COMPLETED | OUTPATIENT
Start: 2022-10-01 | End: 2022-10-01

## 2022-10-01 RX ORDER — FUROSEMIDE 40 MG
40 TABLET ORAL DAILY
Refills: 0 | Status: DISCONTINUED | OUTPATIENT
Start: 2022-10-01 | End: 2022-10-04

## 2022-10-01 RX ORDER — SORBITOL SOLUTION 70 %
15 SOLUTION, ORAL MISCELLANEOUS ONCE
Refills: 0 | Status: COMPLETED | OUTPATIENT
Start: 2022-10-01 | End: 2022-10-01

## 2022-10-01 RX ORDER — IPRATROPIUM/ALBUTEROL SULFATE 18-103MCG
3 AEROSOL WITH ADAPTER (GRAM) INHALATION EVERY 6 HOURS
Refills: 0 | Status: DISCONTINUED | OUTPATIENT
Start: 2022-10-01 | End: 2022-10-04

## 2022-10-01 RX ORDER — SPIRONOLACTONE 25 MG/1
25 TABLET, FILM COATED ORAL DAILY
Refills: 0 | Status: DISCONTINUED | OUTPATIENT
Start: 2022-10-02 | End: 2022-10-04

## 2022-10-01 RX ADMIN — FINASTERIDE 5 MILLIGRAM(S): 5 TABLET, FILM COATED ORAL at 11:38

## 2022-10-01 RX ADMIN — Medication 3 MILLILITER(S): at 18:31

## 2022-10-01 RX ADMIN — Medication 20 MILLIEQUIVALENT(S): at 11:38

## 2022-10-01 RX ADMIN — Medication 20 MILLIGRAM(S): at 11:38

## 2022-10-01 RX ADMIN — TAMSULOSIN HYDROCHLORIDE 0.4 MILLIGRAM(S): 0.4 CAPSULE ORAL at 21:19

## 2022-10-01 RX ADMIN — CHLORHEXIDINE GLUCONATE 1 APPLICATION(S): 213 SOLUTION TOPICAL at 11:25

## 2022-10-01 RX ADMIN — Medication 0.5 MILLIGRAM(S): at 18:31

## 2022-10-01 RX ADMIN — POLYETHYLENE GLYCOL 3350 17 GRAM(S): 17 POWDER, FOR SOLUTION ORAL at 13:13

## 2022-10-01 RX ADMIN — SENNA PLUS 2 TABLET(S): 8.6 TABLET ORAL at 21:19

## 2022-10-01 RX ADMIN — Medication 25 MILLIGRAM(S): at 17:48

## 2022-10-01 RX ADMIN — ENOXAPARIN SODIUM 40 MILLIGRAM(S): 100 INJECTION SUBCUTANEOUS at 17:54

## 2022-10-01 RX ADMIN — Medication 15 MILLILITER(S): at 11:39

## 2022-10-01 RX ADMIN — Medication 500 MILLIGRAM(S): at 05:17

## 2022-10-01 RX ADMIN — Medication 25 MILLIGRAM(S): at 05:20

## 2022-10-01 RX ADMIN — PANTOPRAZOLE SODIUM 40 MILLIGRAM(S): 20 TABLET, DELAYED RELEASE ORAL at 05:20

## 2022-10-01 RX ADMIN — Medication 3 MILLILITER(S): at 13:13

## 2022-10-01 RX ADMIN — AMLODIPINE BESYLATE 2.5 MILLIGRAM(S): 2.5 TABLET ORAL at 05:20

## 2022-10-01 RX ADMIN — Medication 81 MILLIGRAM(S): at 11:39

## 2022-10-01 RX ADMIN — Medication 500 MILLIGRAM(S): at 17:19

## 2022-10-01 RX ADMIN — Medication 0.5 MILLIGRAM(S): at 11:39

## 2022-10-01 RX ADMIN — Medication 20 MILLIGRAM(S): at 05:18

## 2022-10-01 RX ADMIN — Medication 3 MILLILITER(S): at 23:59

## 2022-10-01 NOTE — PROGRESS NOTE ADULT - PROBLEM SELECTOR PLAN 1
continue flomax and proscar   monitor I & O's  trend bmp continue flomax and proscar   monitor I & O's  trend bmp  HAWKINS d/c today for voiding trial

## 2022-10-01 NOTE — PROGRESS NOTE ADULT - ASSESSMENT
71yo man with BPH, admitted with NSTEMI, s/p [facility] with multivessel CAD, undergoing CABG evaluation.   9/24 VSS: continue asa/ bb/ statin and hep gttp  9/27 s/p C3L/ LEFT RADIAL  postop inotropic and pressor support weaned off  cardene gttp for htn  9/28 extubated   postop hiflow o2 for postop hypoxia  + transaminitis  and --> amio erp and statin d/c; trend lft's   9/30 Tx sdu; VSS; RSR 60-70; pulm toilet - low grade temp 100.3 noted  d/c rij/ torito/ rt ct in am if pt stable and output minimal  discharge planning- home pt when stable  71yo man with BPH, admitted with NSTEMI, s/p [facility] with multivessel CAD, undergoing CABG evaluation.   9/24 VSS: continue asa/ bb/ statin and hep gttp  9/27 s/p C3L/ LEFT RADIAL  postop inotropic and pressor support weaned off  cardene gttp for htn  9/28 extubated   postop hiflow o2 for postop hypoxia  + transaminitis  and --> amio erp and statin d/c; trend lft's   9/30 Tx sdu; VSS; RSR 60-70; pulm toilet - low grade temp 100.3 noted  d/c rij/ torito/ rt ct in am if pt stable and output minimal  10/1 VSS; RSR 70-80; rt ct d/c; ck f/u chest xray; d/c pw/ torito/ rij/ still; hiflow O2 weaned off- NC 4L; bronchodilators and pulmicort added; diuretics increased; increase activity as tolerated; LFT'S improved   discharge planning- home pt when stable

## 2022-10-01 NOTE — PROGRESS NOTE ADULT - PROBLEM SELECTOR PLAN 4
+ transaminitis  and --> amio erp and statin d/c   trend lft's + transaminitis  and --> amio erp and statin d/c 9/30   trend lft's  AST decreased to 52 and

## 2022-10-01 NOTE — PROGRESS NOTE ADULT - SUBJECTIVE AND OBJECTIVE BOX
VITAL SIGNS    Telemetry:    Vital Signs Last 24 Hrs  T(C): 37.2 (10-01-22 @ 08:29), Max: 37.9 (22 @ 14:57)  T(F): 98.9 (10-01-22 @ 08:29), Max: 100.3 (22 @ 14:57)  HR: 77 (10-01-22 @ 08:29) (69 - 87)  BP: 93/67 (10-01-22 @ 08:29) (93/67 - 135/67)  RR: 18 (10-01-22 @ 08:29) (17 - 20)  SpO2: 96% (10-01-22 @ 08:29) (91% - 99%)             @ 07:01  -  10-01 @ 07:00  --------------------------------------------------------  IN: 1020 mL / OUT: 1760 mL / NET: -740 mL    10-01 @ 07:01  -  10-01 @ 09:24  --------------------------------------------------------  IN: 240 mL / OUT: 365 mL / NET: -125 mL       Daily     Daily Weight in k.5 (01 Oct 2022 06:13)  Admit Wt: Drug Dosing Weight  Height (cm): 180.3 (27 Sep 2022 13:04)  Weight (kg): 83.5 (27 Sep 2022 13:04)  BMI (kg/m2): 25.7 (27 Sep 2022 13:04)  BSA (m2): 2.04 (27 Sep 2022 13:04)    Bilirubin Total, Serum: 1.1 mg/dL (10-01 @ 05:37)    CAPILLARY BLOOD GLUCOSE      POCT Blood Glucose.: 149 mg/dL (01 Oct 2022 07:38)  POCT Blood Glucose.: 128 mg/dL (30 Sep 2022 21:20)  POCT Blood Glucose.: 148 mg/dL (30 Sep 2022 16:25)  POCT Blood Glucose.: 157 mg/dL (30 Sep 2022 11:36)          amLODIPine   Tablet 2.5 milliGRAM(s) Oral daily  ascorbic acid 500 milliGRAM(s) Oral two times a day  aspirin enteric coated 81 milliGRAM(s) Oral daily  atorvastatin 40 milliGRAM(s) Oral at bedtime  benzocaine 15 mG/menthol 3.6 mG Lozenge 1 Lozenge Oral every 6 hours PRN  bisacodyl Suppository 10 milliGRAM(s) Rectal once  chlorhexidine 2% Cloths 1 Application(s) Topical daily  enoxaparin Injectable 40 milliGRAM(s) SubCutaneous every 24 hours  finasteride 5 milliGRAM(s) Oral daily  furosemide    Tablet 20 milliGRAM(s) Oral daily  insulin lispro (ADMELOG) corrective regimen sliding scale   SubCutaneous three times a day before meals  insulin lispro (ADMELOG) corrective regimen sliding scale   SubCutaneous at bedtime  metoprolol tartrate 25 milliGRAM(s) Oral two times a day  oxyCODONE    IR 5 milliGRAM(s) Oral every 4 hours PRN  oxyCODONE    IR 10 milliGRAM(s) Oral every 4 hours PRN  pantoprazole    Tablet 40 milliGRAM(s) Oral before breakfast  polyethylene glycol 3350 17 Gram(s) Oral daily  senna 2 Tablet(s) Oral at bedtime  sodium chloride 0.9%. 1000 milliLiter(s) IV Continuous <Continuous>  tamsulosin 0.4 milliGRAM(s) Oral at bedtime      PHYSICAL EXAM    Subjective: "Hi.   Neurology: alert and oriented x 3, nonfocal, no gross deficits  CV : tele:  RSR  Sternal Wound :  CDI with dressing , Stable  Lungs: clear. RR easy, unlabored   Abdomen: soft, nontender, nondistended, positive bowel sounds, bowel movement   Neg N/V/D   :  pt voiding without difficulty   Extremities:   MOISE; edema, neg calf tenderness.   PPP bilaterally      PW:  Chest tubes:                 VITAL SIGNS    Telemetry:  rsr 70-80  Vital Signs Last 24 Hrs  T(C): 37.2 (10-01-22 @ 08:29), Max: 37.9 (22 @ 14:57)  T(F): 98.9 (10-01-22 @ 08:29), Max: 100.3 (22 @ 14:57)  HR: 77 (10-01-22 @ 08:29) (69 - 87)  BP: 93/67 (10-01-22 @ 08:29) (93/67 - 135/67)  RR: 18 (10-01-22 @ 08:29) (17 - 20)  SpO2: 96% (10-01-22 @ 08:29) (91% - 99%)             @ 07:01  -  10-01 @ 07:00  --------------------------------------------------------  IN: 1020 mL / OUT: 1760 mL / NET: -740 mL    10-01 @ 07:01  -  10-01 @ 09:24  --------------------------------------------------------  IN: 240 mL / OUT: 365 mL / NET: -125 mL       Daily     Daily Weight in k.5 (01 Oct 2022 06:13)  Admit Wt: Drug Dosing Weight  Height (cm): 180.3 (27 Sep 2022 13:04)  Weight (kg): 83.5 (27 Sep 2022 13:04)  BMI (kg/m2): 25.7 (27 Sep 2022 13:04)  BSA (m2): 2.04 (27 Sep 2022 13:04)    Bilirubin Total, Serum: 1.1 mg/dL (10-01 @ 05:37)    CAPILLARY BLOOD GLUCOSE      POCT Blood Glucose.: 149 mg/dL (01 Oct 2022 07:38)  POCT Blood Glucose.: 128 mg/dL (30 Sep 2022 21:20)  POCT Blood Glucose.: 148 mg/dL (30 Sep 2022 16:25)  POCT Blood Glucose.: 157 mg/dL (30 Sep 2022 11:36)          amLODIPine   Tablet 2.5 milliGRAM(s) Oral daily  ascorbic acid 500 milliGRAM(s) Oral two times a day  aspirin enteric coated 81 milliGRAM(s) Oral daily  atorvastatin 40 milliGRAM(s) Oral at bedtime  benzocaine 15 mG/menthol 3.6 mG Lozenge 1 Lozenge Oral every 6 hours PRN  bisacodyl Suppository 10 milliGRAM(s) Rectal once  chlorhexidine 2% Cloths 1 Application(s) Topical daily  enoxaparin Injectable 40 milliGRAM(s) SubCutaneous every 24 hours  finasteride 5 milliGRAM(s) Oral daily  furosemide    Tablet 20 milliGRAM(s) Oral daily  insulin lispro (ADMELOG) corrective regimen sliding scale   SubCutaneous three times a day before meals  insulin lispro (ADMELOG) corrective regimen sliding scale   SubCutaneous at bedtime  metoprolol tartrate 25 milliGRAM(s) Oral two times a day  oxyCODONE    IR 5 milliGRAM(s) Oral every 4 hours PRN  oxyCODONE    IR 10 milliGRAM(s) Oral every 4 hours PRN  pantoprazole    Tablet 40 milliGRAM(s) Oral before breakfast  polyethylene glycol 3350 17 Gram(s) Oral daily  senna 2 Tablet(s) Oral at bedtime  sodium chloride 0.9%. 1000 milliLiter(s) IV Continuous <Continuous>  tamsulosin 0.4 milliGRAM(s) Oral at bedtime      PHYSICAL EXAM    Subjective: "Martínez."  Neurology: alert and oriented x 3, nonfocal, no gross deficits  CV : tele:  RSR 70-80  +rij intro--> d/c this am   Sternal Wound :  CDI with prevena dressing , Stable  Lungs: clear;  RR easy, unlabored - on 50% hi flow o2 --> weaned off now on NC   Abdomen: soft, nontender, nondistended, positive bowel sounds, + bowel movement   Neg N/V/D   :  + still d/c- pt dtv  Extremities:   MOISE; +1 LE edema, neg calf tenderness.   PPP bilaterally; donte. svg site cdi w/ dressing; left radial site cdi w/ dressing & ace wrap  rt radial torito cdi --> d/c this am       PW: + pw VVI 40 /10---d/c this am   Chest tubes: rt pleural ct draining minimal serous- sang drainage

## 2022-10-01 NOTE — PROGRESS NOTE ADULT - PROBLEM SELECTOR PLAN 2
continue lop 25 bid   continue diuresis   start norvasc 2.5 qd  monitor vs continue lop 25 bid   continue diuresis   continue norvasc 2.5 qd  monitor vs

## 2022-10-01 NOTE — PROGRESS NOTE ADULT - PROBLEM SELECTOR PLAN 3
continue postop care  continue asa and bb   no statin at this time secondary to transaminitis  start norvasc for radial graft and htn  d/c torito/ rij/ still and rt ct in am if output minimal and pt stable  bowel regimen  diuresis  pulm toilet  pain management  increase activity as tolerated  Discharge planning- home pt when stable continue postop care  continue asa and bb   no statin at this time secondary to transaminitis  c/w norvasc for radial graft and htn  d/c torito/ rimarcell/ tessy/ and pw this am   d/c rt chest tube; ck followup chest xray  bronchodilators/ pulmicort   bowel regimen  diuresis  pulm toilet  pain management  increase activity as tolerated  Discharge planning- home pt when stable continue postop care  continue asa and bb   no statin at this time secondary to transaminitis; lft's improving   c/w norvasc for radial graft and htn  d/c torito/ joslyn/ tessy/ and sammi this am   d/c rt chest tube; ck followup chest xray  bronchodilators/ pulmicort   bowel regimen  diuresis  pulm toilet  pain management  increase activity as tolerated  Discharge planning- home pt when stable

## 2022-10-02 LAB
ALBUMIN SERPL ELPH-MCNC: 3.4 G/DL — SIGNIFICANT CHANGE UP (ref 3.3–5)
ALP SERPL-CCNC: 83 U/L — SIGNIFICANT CHANGE UP (ref 40–120)
ALT FLD-CCNC: 89 U/L — HIGH (ref 10–45)
ANION GAP SERPL CALC-SCNC: 8 MMOL/L — SIGNIFICANT CHANGE UP (ref 5–17)
AST SERPL-CCNC: 35 U/L — SIGNIFICANT CHANGE UP (ref 10–40)
BILIRUB SERPL-MCNC: 1.2 MG/DL — SIGNIFICANT CHANGE UP (ref 0.2–1.2)
BUN SERPL-MCNC: 21 MG/DL — SIGNIFICANT CHANGE UP (ref 7–23)
CALCIUM SERPL-MCNC: 8.9 MG/DL — SIGNIFICANT CHANGE UP (ref 8.4–10.5)
CHLORIDE SERPL-SCNC: 103 MMOL/L — SIGNIFICANT CHANGE UP (ref 96–108)
CO2 SERPL-SCNC: 28 MMOL/L — SIGNIFICANT CHANGE UP (ref 22–31)
CREAT SERPL-MCNC: 1.01 MG/DL — SIGNIFICANT CHANGE UP (ref 0.5–1.3)
EGFR: 78 ML/MIN/1.73M2 — SIGNIFICANT CHANGE UP
GLUCOSE BLDC GLUCOMTR-MCNC: 110 MG/DL — HIGH (ref 70–99)
GLUCOSE BLDC GLUCOMTR-MCNC: 113 MG/DL — HIGH (ref 70–99)
GLUCOSE BLDC GLUCOMTR-MCNC: 152 MG/DL — HIGH (ref 70–99)
GLUCOSE BLDC GLUCOMTR-MCNC: 161 MG/DL — HIGH (ref 70–99)
GLUCOSE SERPL-MCNC: 112 MG/DL — HIGH (ref 70–99)
HCT VFR BLD CALC: 32.1 % — LOW (ref 39–50)
HGB BLD-MCNC: 10.5 G/DL — LOW (ref 13–17)
MCHC RBC-ENTMCNC: 31.3 PG — SIGNIFICANT CHANGE UP (ref 27–34)
MCHC RBC-ENTMCNC: 32.7 GM/DL — SIGNIFICANT CHANGE UP (ref 32–36)
MCV RBC AUTO: 95.8 FL — SIGNIFICANT CHANGE UP (ref 80–100)
NRBC # BLD: 0 /100 WBCS — SIGNIFICANT CHANGE UP (ref 0–0)
PLATELET # BLD AUTO: 186 K/UL — SIGNIFICANT CHANGE UP (ref 150–400)
POTASSIUM SERPL-MCNC: 3.9 MMOL/L — SIGNIFICANT CHANGE UP (ref 3.5–5.3)
POTASSIUM SERPL-SCNC: 3.9 MMOL/L — SIGNIFICANT CHANGE UP (ref 3.5–5.3)
PROT SERPL-MCNC: 5.9 G/DL — LOW (ref 6–8.3)
RBC # BLD: 3.35 M/UL — LOW (ref 4.2–5.8)
RBC # FLD: 11.6 % — SIGNIFICANT CHANGE UP (ref 10.3–14.5)
SODIUM SERPL-SCNC: 139 MMOL/L — SIGNIFICANT CHANGE UP (ref 135–145)
WBC # BLD: 6.54 K/UL — SIGNIFICANT CHANGE UP (ref 3.8–10.5)
WBC # FLD AUTO: 6.54 K/UL — SIGNIFICANT CHANGE UP (ref 3.8–10.5)

## 2022-10-02 RX ORDER — MAGNESIUM SULFATE 500 MG/ML
2 VIAL (ML) INJECTION ONCE
Refills: 0 | Status: COMPLETED | OUTPATIENT
Start: 2022-10-02 | End: 2022-10-02

## 2022-10-02 RX ORDER — POTASSIUM BICARBONATE 978 MG/1
25 TABLET, EFFERVESCENT ORAL ONCE
Refills: 0 | Status: COMPLETED | OUTPATIENT
Start: 2022-10-02 | End: 2022-10-02

## 2022-10-02 RX ADMIN — PANTOPRAZOLE SODIUM 40 MILLIGRAM(S): 20 TABLET, DELAYED RELEASE ORAL at 05:17

## 2022-10-02 RX ADMIN — Medication 0.5 MILLIGRAM(S): at 05:18

## 2022-10-02 RX ADMIN — Medication 3 MILLILITER(S): at 17:06

## 2022-10-02 RX ADMIN — OXYCODONE HYDROCHLORIDE 10 MILLIGRAM(S): 5 TABLET ORAL at 16:47

## 2022-10-02 RX ADMIN — Medication 0.5 MILLIGRAM(S): at 17:06

## 2022-10-02 RX ADMIN — AMLODIPINE BESYLATE 2.5 MILLIGRAM(S): 2.5 TABLET ORAL at 05:17

## 2022-10-02 RX ADMIN — POLYETHYLENE GLYCOL 3350 17 GRAM(S): 17 POWDER, FOR SOLUTION ORAL at 11:39

## 2022-10-02 RX ADMIN — Medication 1: at 07:41

## 2022-10-02 RX ADMIN — Medication 3 MILLILITER(S): at 05:22

## 2022-10-02 RX ADMIN — SENNA PLUS 2 TABLET(S): 8.6 TABLET ORAL at 21:09

## 2022-10-02 RX ADMIN — Medication 1: at 16:46

## 2022-10-02 RX ADMIN — Medication 81 MILLIGRAM(S): at 11:39

## 2022-10-02 RX ADMIN — Medication 500 MILLIGRAM(S): at 17:07

## 2022-10-02 RX ADMIN — FINASTERIDE 5 MILLIGRAM(S): 5 TABLET, FILM COATED ORAL at 11:39

## 2022-10-02 RX ADMIN — Medication 500 MILLIGRAM(S): at 05:17

## 2022-10-02 RX ADMIN — ENOXAPARIN SODIUM 40 MILLIGRAM(S): 100 INJECTION SUBCUTANEOUS at 19:10

## 2022-10-02 RX ADMIN — CHLORHEXIDINE GLUCONATE 1 APPLICATION(S): 213 SOLUTION TOPICAL at 11:37

## 2022-10-02 RX ADMIN — Medication 25 MILLIGRAM(S): at 17:07

## 2022-10-02 RX ADMIN — OXYCODONE HYDROCHLORIDE 10 MILLIGRAM(S): 5 TABLET ORAL at 21:08

## 2022-10-02 RX ADMIN — POTASSIUM BICARBONATE 25 MILLIEQUIVALENT(S): 978 TABLET, EFFERVESCENT ORAL at 07:41

## 2022-10-02 RX ADMIN — Medication 40 MILLIGRAM(S): at 05:17

## 2022-10-02 RX ADMIN — Medication 25 MILLIGRAM(S): at 05:17

## 2022-10-02 RX ADMIN — Medication 3 MILLILITER(S): at 11:38

## 2022-10-02 RX ADMIN — OXYCODONE HYDROCHLORIDE 10 MILLIGRAM(S): 5 TABLET ORAL at 15:42

## 2022-10-02 RX ADMIN — TAMSULOSIN HYDROCHLORIDE 0.4 MILLIGRAM(S): 0.4 CAPSULE ORAL at 21:08

## 2022-10-02 RX ADMIN — SPIRONOLACTONE 25 MILLIGRAM(S): 25 TABLET, FILM COATED ORAL at 05:22

## 2022-10-02 RX ADMIN — OXYCODONE HYDROCHLORIDE 10 MILLIGRAM(S): 5 TABLET ORAL at 22:08

## 2022-10-02 RX ADMIN — Medication 25 GRAM(S): at 16:47

## 2022-10-02 NOTE — PROGRESS NOTE ADULT - ASSESSMENT
73yo man with BPH, admitted with NSTEMI, s/p [facility] with multivessel CAD, undergoing CABG evaluation.   9/24 VSS: continue asa/ bb/ statin and hep gttp  9/27 s/p C3L/ LEFT RADIAL  postop inotropic and pressor support weaned off  cardene gttp for htn  9/28 extubated   postop hiflow o2 for postop hypoxia  + transaminitis  and --> amio erp and statin d/c; trend lft's   9/30 Tx sdu; VSS; RSR 60-70; pulm toilet - low grade temp 100.3 noted  d/c rij/ torito/ rt ct in am if pt stable and output minimal  10/1 VSS; RSR 70-80; rt ct d/c; ck f/u chest xray; d/c pw/ torito/ rij/ still; hiflow O2 weaned off- NC 4L; bronchodilators and pulmicort added; diuretics increased; increase activity as tolerated; LFT'S improved   discharge planning- home pt when stable   10/2  chest pt and ambulation. 93 on 3lnco2, IS 1000.  Pain management, bronchodilators

## 2022-10-02 NOTE — PROGRESS NOTE ADULT - SUBJECTIVE AND OBJECTIVE BOX
VITAL SIGNS    Telemetry:  nsr 70    Vital Signs Last 24 Hrs  T(C): 36.6 (10-02-22 @ 07:15), Max: 37.1 (10-01-22 @ 12:07)  T(F): 97.9 (10-02-22 @ 07:15), Max: 98.7 (10-01-22 @ 12:07)  HR: 77 (10-02-22 @ 09:02) (74 - 88)  BP: 109/61 (10-02-22 @ 07:15) (107/60 - 119/66)  RR: 16 (10-02-22 @ 09:02) (16 - 20)  SpO2: 94% (10-02-22 @ 09:02) (93% - 97%)                   10-01 @ 07:01  -  10-02 @ 07:00  --------------------------------------------------------  IN: 1480 mL / OUT: 2940 mL / NET: -1460 mL    10-02 @ 07:01  -  10-02 @ 10:01  --------------------------------------------------------  IN: 360 mL / OUT: 0 mL / NET: 360 mL          Daily     Daily Weight in k.7 (02 Oct 2022 06:44)            CAPILLARY BLOOD GLUCOSE      POCT Blood Glucose.: 122 mg/dL (01 Oct 2022 20:48)  POCT Blood Glucose.: 129 mg/dL (01 Oct 2022 16:29)  POCT Blood Glucose.: 113 mg/dL (01 Oct 2022 11:36)                Pacing Wires          Coumadin    [ ] YES          [ x ]      NO                                   PHYSICAL EXAM        Neurology: alert and oriented x 3, nonfocal, no gross deficits  CV : s1 s2 RRR  Sternal Wound :  CDI , Stable provena in place  Lungs: cta  poor inspiration   Abdomen: soft, nontender, nondistended, positive bowel sounds, last bowel movement -                   :    voiding         Extremities:     - edema   /  -   calve tenderness ,    L leg incisions cdi          albuterol/ipratropium for Nebulization 3 milliLiter(s) Nebulizer every 6 hours  amLODIPine   Tablet 2.5 milliGRAM(s) Oral daily  ascorbic acid 500 milliGRAM(s) Oral two times a day  aspirin enteric coated 81 milliGRAM(s) Oral daily  benzocaine 15 mG/menthol 3.6 mG Lozenge 1 Lozenge Oral every 6 hours PRN  bisacodyl Suppository 10 milliGRAM(s) Rectal once  buDESOnide    Inhalation Suspension 0.5 milliGRAM(s) Inhalation two times a day  chlorhexidine 2% Cloths 1 Application(s) Topical daily  enoxaparin Injectable 40 milliGRAM(s) SubCutaneous every 24 hours  finasteride 5 milliGRAM(s) Oral daily  furosemide    Tablet 40 milliGRAM(s) Oral daily  insulin lispro (ADMELOG) corrective regimen sliding scale   SubCutaneous three times a day before meals  insulin lispro (ADMELOG) corrective regimen sliding scale   SubCutaneous at bedtime  metoprolol tartrate 25 milliGRAM(s) Oral two times a day  oxyCODONE    IR 10 milliGRAM(s) Oral every 4 hours PRN  oxyCODONE    IR 5 milliGRAM(s) Oral every 4 hours PRN  pantoprazole    Tablet 40 milliGRAM(s) Oral before breakfast  polyethylene glycol 3350 17 Gram(s) Oral daily  senna 2 Tablet(s) Oral at bedtime  sodium chloride 0.9%. 1000 milliLiter(s) IV Continuous <Continuous>  spironolactone 25 milliGRAM(s) Oral daily  tamsulosin 0.4 milliGRAM(s) Oral at bedtime                    Physical Therapy Rec:   Home  [  ]   Home w/ PT  [  ]  Rehab  [  ]  Discussed with Cardiothoracic Team at AM rounds.

## 2022-10-03 LAB
ALBUMIN SERPL ELPH-MCNC: 3.5 G/DL — SIGNIFICANT CHANGE UP (ref 3.3–5)
ALP SERPL-CCNC: 83 U/L — SIGNIFICANT CHANGE UP (ref 40–120)
ALT FLD-CCNC: 65 U/L — HIGH (ref 10–45)
ANION GAP SERPL CALC-SCNC: 11 MMOL/L — SIGNIFICANT CHANGE UP (ref 5–17)
AST SERPL-CCNC: 20 U/L — SIGNIFICANT CHANGE UP (ref 10–40)
BILIRUB SERPL-MCNC: 1.1 MG/DL — SIGNIFICANT CHANGE UP (ref 0.2–1.2)
BUN SERPL-MCNC: 21 MG/DL — SIGNIFICANT CHANGE UP (ref 7–23)
CALCIUM SERPL-MCNC: 8.9 MG/DL — SIGNIFICANT CHANGE UP (ref 8.4–10.5)
CHLORIDE SERPL-SCNC: 99 MMOL/L — SIGNIFICANT CHANGE UP (ref 96–108)
CO2 SERPL-SCNC: 28 MMOL/L — SIGNIFICANT CHANGE UP (ref 22–31)
CREAT SERPL-MCNC: 1 MG/DL — SIGNIFICANT CHANGE UP (ref 0.5–1.3)
EGFR: 79 ML/MIN/1.73M2 — SIGNIFICANT CHANGE UP
GLUCOSE BLDC GLUCOMTR-MCNC: 104 MG/DL — HIGH (ref 70–99)
GLUCOSE BLDC GLUCOMTR-MCNC: 115 MG/DL — HIGH (ref 70–99)
GLUCOSE BLDC GLUCOMTR-MCNC: 122 MG/DL — HIGH (ref 70–99)
GLUCOSE BLDC GLUCOMTR-MCNC: 141 MG/DL — HIGH (ref 70–99)
GLUCOSE SERPL-MCNC: 107 MG/DL — HIGH (ref 70–99)
HCT VFR BLD CALC: 32.5 % — LOW (ref 39–50)
HGB BLD-MCNC: 10.8 G/DL — LOW (ref 13–17)
MAGNESIUM SERPL-MCNC: 2.4 MG/DL — SIGNIFICANT CHANGE UP (ref 1.6–2.6)
MCHC RBC-ENTMCNC: 32 PG — SIGNIFICANT CHANGE UP (ref 27–34)
MCHC RBC-ENTMCNC: 33.2 GM/DL — SIGNIFICANT CHANGE UP (ref 32–36)
MCV RBC AUTO: 96.2 FL — SIGNIFICANT CHANGE UP (ref 80–100)
NRBC # BLD: 0 /100 WBCS — SIGNIFICANT CHANGE UP (ref 0–0)
PLATELET # BLD AUTO: 235 K/UL — SIGNIFICANT CHANGE UP (ref 150–400)
POTASSIUM SERPL-MCNC: 4.1 MMOL/L — SIGNIFICANT CHANGE UP (ref 3.5–5.3)
POTASSIUM SERPL-SCNC: 4.1 MMOL/L — SIGNIFICANT CHANGE UP (ref 3.5–5.3)
PROT SERPL-MCNC: 6.2 G/DL — SIGNIFICANT CHANGE UP (ref 6–8.3)
RBC # BLD: 3.38 M/UL — LOW (ref 4.2–5.8)
RBC # FLD: 11.8 % — SIGNIFICANT CHANGE UP (ref 10.3–14.5)
SARS-COV-2 RNA SPEC QL NAA+PROBE: SIGNIFICANT CHANGE UP
SODIUM SERPL-SCNC: 138 MMOL/L — SIGNIFICANT CHANGE UP (ref 135–145)
WBC # BLD: 6.41 K/UL — SIGNIFICANT CHANGE UP (ref 3.8–10.5)
WBC # FLD AUTO: 6.41 K/UL — SIGNIFICANT CHANGE UP (ref 3.8–10.5)

## 2022-10-03 PROCEDURE — 71045 X-RAY EXAM CHEST 1 VIEW: CPT | Mod: 26

## 2022-10-03 RX ORDER — SODIUM CHLORIDE 0.65 %
1 AEROSOL, SPRAY (ML) NASAL THREE TIMES A DAY
Refills: 0 | Status: DISCONTINUED | OUTPATIENT
Start: 2022-10-03 | End: 2022-10-04

## 2022-10-03 RX ADMIN — OXYCODONE HYDROCHLORIDE 5 MILLIGRAM(S): 5 TABLET ORAL at 11:39

## 2022-10-03 RX ADMIN — Medication 0.5 MILLIGRAM(S): at 05:03

## 2022-10-03 RX ADMIN — Medication 25 MILLIGRAM(S): at 05:02

## 2022-10-03 RX ADMIN — CHLORHEXIDINE GLUCONATE 1 APPLICATION(S): 213 SOLUTION TOPICAL at 10:12

## 2022-10-03 RX ADMIN — PANTOPRAZOLE SODIUM 40 MILLIGRAM(S): 20 TABLET, DELAYED RELEASE ORAL at 05:02

## 2022-10-03 RX ADMIN — Medication 3 MILLILITER(S): at 17:14

## 2022-10-03 RX ADMIN — TAMSULOSIN HYDROCHLORIDE 0.4 MILLIGRAM(S): 0.4 CAPSULE ORAL at 20:12

## 2022-10-03 RX ADMIN — AMLODIPINE BESYLATE 2.5 MILLIGRAM(S): 2.5 TABLET ORAL at 05:02

## 2022-10-03 RX ADMIN — Medication 3 MILLILITER(S): at 05:02

## 2022-10-03 RX ADMIN — Medication 0.5 MILLIGRAM(S): at 17:15

## 2022-10-03 RX ADMIN — SPIRONOLACTONE 25 MILLIGRAM(S): 25 TABLET, FILM COATED ORAL at 05:02

## 2022-10-03 RX ADMIN — FINASTERIDE 5 MILLIGRAM(S): 5 TABLET, FILM COATED ORAL at 11:40

## 2022-10-03 RX ADMIN — POLYETHYLENE GLYCOL 3350 17 GRAM(S): 17 POWDER, FOR SOLUTION ORAL at 11:40

## 2022-10-03 RX ADMIN — Medication 25 MILLIGRAM(S): at 17:15

## 2022-10-03 RX ADMIN — OXYCODONE HYDROCHLORIDE 5 MILLIGRAM(S): 5 TABLET ORAL at 12:15

## 2022-10-03 RX ADMIN — Medication 81 MILLIGRAM(S): at 11:39

## 2022-10-03 RX ADMIN — Medication 3 MILLILITER(S): at 11:39

## 2022-10-03 RX ADMIN — ENOXAPARIN SODIUM 40 MILLIGRAM(S): 100 INJECTION SUBCUTANEOUS at 17:18

## 2022-10-03 RX ADMIN — Medication 40 MILLIGRAM(S): at 05:02

## 2022-10-03 NOTE — PROGRESS NOTE ADULT - SUBJECTIVE AND OBJECTIVE BOX
VITAL SIGNS    Telemetry:  nsr 70-90    Vital Signs Last 24 Hrs  T(C): 36.5 (10-03-22 @ 11:12), Max: 36.9 (10-02-22 @ 23:57)  T(F): 97.7 (10-03-22 @ 11:12), Max: 98.4 (10-02-22 @ 23:57)  HR: 85 (10-03-22 @ 11:12) (75 - 85)  BP: 110/65 (10-03-22 @ 11:12) (110/65 - 123/59)  RR: 18 (10-03-22 @ 11:12) (18 - 20)  SpO2: 93% (10-03-22 @ 11:12) (92% - 94%)                   10-02 @ 07:01  -  10-03 @ 07:00  --------------------------------------------------------  IN: 750 mL / OUT: 2225 mL / NET: -1475 mL    10-03 @ 07:01  -  10-03 @ 11:44  --------------------------------------------------------  IN: 240 mL / OUT: 300 mL / NET: -60 mL          Daily     Daily Weight in k.1 (03 Oct 2022 06:00)            CAPILLARY BLOOD GLUCOSE      POCT Blood Glucose.: 104 mg/dL (03 Oct 2022 11:27)  POCT Blood Glucose.: 122 mg/dL (03 Oct 2022 07:33)  POCT Blood Glucose.: 113 mg/dL (02 Oct 2022 20:49)  POCT Blood Glucose.: 152 mg/dL (02 Oct 2022 16:41)              Pacing Wires        [  ]   Settings:                                  Isolated  [ x ]    Coumadin    [ ] YES          [  x]      NO                                   PHYSICAL EXAM    Neurology: alert and oriented x 3, nonfocal, no gross deficits  CV : s1 s2 RRR  Sternal Wound :  CDI , Stable provena in place  Lungs: cta  poor inspiration   Abdomen: soft, nontender, nondistended, positive bowel sounds, last bowel movement -                   :    voiding         Extremities:     - edema   /  -   calve tenderness ,    L leg incisions cdi              albuterol/ipratropium for Nebulization 3 milliLiter(s) Nebulizer every 6 hours  amLODIPine   Tablet 2.5 milliGRAM(s) Oral daily  aspirin enteric coated 81 milliGRAM(s) Oral daily  benzocaine 15 mG/menthol 3.6 mG Lozenge 1 Lozenge Oral every 6 hours PRN  bisacodyl Suppository 10 milliGRAM(s) Rectal once  buDESOnide    Inhalation Suspension 0.5 milliGRAM(s) Inhalation two times a day  chlorhexidine 2% Cloths 1 Application(s) Topical daily  enoxaparin Injectable 40 milliGRAM(s) SubCutaneous every 24 hours  finasteride 5 milliGRAM(s) Oral daily  furosemide    Tablet 40 milliGRAM(s) Oral daily  insulin lispro (ADMELOG) corrective regimen sliding scale   SubCutaneous three times a day before meals  insulin lispro (ADMELOG) corrective regimen sliding scale   SubCutaneous at bedtime  metoprolol tartrate 25 milliGRAM(s) Oral two times a day  oxyCODONE    IR 5 milliGRAM(s) Oral every 4 hours PRN  oxyCODONE    IR 10 milliGRAM(s) Oral every 4 hours PRN  pantoprazole    Tablet 40 milliGRAM(s) Oral before breakfast  polyethylene glycol 3350 17 Gram(s) Oral daily  senna 2 Tablet(s) Oral at bedtime  sodium chloride 0.9%. 1000 milliLiter(s) IV Continuous <Continuous>  spironolactone 25 milliGRAM(s) Oral daily  tamsulosin 0.4 milliGRAM(s) Oral at bedtime                    Physical Therapy Rec:   Home  [  ]   Home w/ PT  [  ]  Rehab  [  ]  Discussed with Cardiothoracic Team at AM rounds.

## 2022-10-03 NOTE — PROGRESS NOTE ADULT - ASSESSMENT
71yo man with BPH, admitted with NSTEMI, s/p [facility] with multivessel CAD, undergoing CABG evaluation.   9/24 VSS: continue asa/ bb/ statin and hep gttp  9/27 s/p C3L/ LEFT RADIAL  postop inotropic and pressor support weaned off  cardene gttp for htn  9/28 extubated   postop hiflow o2 for postop hypoxia  + transaminitis  and --> amio erp and statin d/c; trend lft's   9/30 Tx sdu; VSS; RSR 60-70; pulm toilet - low grade temp 100.3 noted  d/c rij/ torito/ rt ct in am if pt stable and output minimal  10/1 VSS; RSR 70-80; rt ct d/c; ck f/u chest xray; d/c pw/ torito/ rij/ still; hiflow O2 weaned off- NC 4L; bronchodilators and pulmicort added; diuretics increased; increase activity as tolerated; LFT'S improved   discharge planning- home pt when stable   10/2  chest pt and ambulation. 93 on 3lnco2, IS 1000.  Pain management, bronchodilators  10/3  Transfer to floor.  Wean o2- remains on 2L  Gentle diuresis  Ambulation

## 2022-10-03 NOTE — PROGRESS NOTE ADULT - PROBLEM SELECTOR PLAN 3
continue postop care  continue asa and bb   no statin at this time secondary to transaminitis; lft's improving   c/w norvasc for radial graft and htn  d/c torito/ joslyn/ tessy/ and sammi this am   d/c rt chest tube; ck followup chest xray  bronchodilators/ pulmicort   bowel regimen  diuresis  pulm toilet  pain management  increase activity as tolerated  Discharge planning- home pt when stable

## 2022-10-04 ENCOUNTER — TRANSCRIPTION ENCOUNTER (OUTPATIENT)
Age: 72
End: 2022-10-04

## 2022-10-04 VITALS — DIASTOLIC BLOOD PRESSURE: 68 MMHG | HEART RATE: 88 BPM | SYSTOLIC BLOOD PRESSURE: 116 MMHG

## 2022-10-04 LAB
ALBUMIN SERPL ELPH-MCNC: 3.5 G/DL — SIGNIFICANT CHANGE UP (ref 3.3–5)
ALP SERPL-CCNC: 73 U/L — SIGNIFICANT CHANGE UP (ref 40–120)
ALT FLD-CCNC: 49 U/L — HIGH (ref 10–45)
ANION GAP SERPL CALC-SCNC: 12 MMOL/L — SIGNIFICANT CHANGE UP (ref 5–17)
AST SERPL-CCNC: 15 U/L — SIGNIFICANT CHANGE UP (ref 10–40)
BILIRUB SERPL-MCNC: 1.2 MG/DL — SIGNIFICANT CHANGE UP (ref 0.2–1.2)
BUN SERPL-MCNC: 20 MG/DL — SIGNIFICANT CHANGE UP (ref 7–23)
CALCIUM SERPL-MCNC: 9 MG/DL — SIGNIFICANT CHANGE UP (ref 8.4–10.5)
CHLORIDE SERPL-SCNC: 100 MMOL/L — SIGNIFICANT CHANGE UP (ref 96–108)
CO2 SERPL-SCNC: 26 MMOL/L — SIGNIFICANT CHANGE UP (ref 22–31)
CREAT SERPL-MCNC: 1.01 MG/DL — SIGNIFICANT CHANGE UP (ref 0.5–1.3)
EGFR: 78 ML/MIN/1.73M2 — SIGNIFICANT CHANGE UP
GLUCOSE BLDC GLUCOMTR-MCNC: 119 MG/DL — HIGH (ref 70–99)
GLUCOSE BLDC GLUCOMTR-MCNC: 152 MG/DL — HIGH (ref 70–99)
GLUCOSE BLDC GLUCOMTR-MCNC: 171 MG/DL — HIGH (ref 70–99)
GLUCOSE SERPL-MCNC: 115 MG/DL — HIGH (ref 70–99)
HCT VFR BLD CALC: 32.9 % — LOW (ref 39–50)
HGB BLD-MCNC: 11.1 G/DL — LOW (ref 13–17)
MCHC RBC-ENTMCNC: 31.9 PG — SIGNIFICANT CHANGE UP (ref 27–34)
MCHC RBC-ENTMCNC: 33.7 GM/DL — SIGNIFICANT CHANGE UP (ref 32–36)
MCV RBC AUTO: 94.5 FL — SIGNIFICANT CHANGE UP (ref 80–100)
NRBC # BLD: 0 /100 WBCS — SIGNIFICANT CHANGE UP (ref 0–0)
PLATELET # BLD AUTO: 275 K/UL — SIGNIFICANT CHANGE UP (ref 150–400)
POTASSIUM SERPL-MCNC: 4.2 MMOL/L — SIGNIFICANT CHANGE UP (ref 3.5–5.3)
POTASSIUM SERPL-SCNC: 4.2 MMOL/L — SIGNIFICANT CHANGE UP (ref 3.5–5.3)
PROT SERPL-MCNC: 6.4 G/DL — SIGNIFICANT CHANGE UP (ref 6–8.3)
RBC # BLD: 3.48 M/UL — LOW (ref 4.2–5.8)
RBC # FLD: 12 % — SIGNIFICANT CHANGE UP (ref 10.3–14.5)
SODIUM SERPL-SCNC: 138 MMOL/L — SIGNIFICANT CHANGE UP (ref 135–145)
WBC # BLD: 6.96 K/UL — SIGNIFICANT CHANGE UP (ref 3.8–10.5)
WBC # FLD AUTO: 6.96 K/UL — SIGNIFICANT CHANGE UP (ref 3.8–10.5)

## 2022-10-04 RX ORDER — CLOPIDOGREL BISULFATE 75 MG/1
1 TABLET, FILM COATED ORAL
Qty: 30 | Refills: 0
Start: 2022-10-04 | End: 2022-11-02

## 2022-10-04 RX ORDER — FINASTERIDE 5 MG/1
1 TABLET, FILM COATED ORAL
Qty: 30 | Refills: 0
Start: 2022-10-04 | End: 2022-11-02

## 2022-10-04 RX ORDER — PANTOPRAZOLE SODIUM 20 MG/1
1 TABLET, DELAYED RELEASE ORAL
Qty: 30 | Refills: 0
Start: 2022-10-04 | End: 2022-11-02

## 2022-10-04 RX ORDER — TAMSULOSIN HYDROCHLORIDE 0.4 MG/1
1 CAPSULE ORAL
Qty: 0 | Refills: 0 | DISCHARGE

## 2022-10-04 RX ORDER — CLOPIDOGREL BISULFATE 75 MG/1
75 TABLET, FILM COATED ORAL DAILY
Refills: 0 | Status: DISCONTINUED | OUTPATIENT
Start: 2022-10-04 | End: 2022-10-04

## 2022-10-04 RX ORDER — SENNA PLUS 8.6 MG/1
2 TABLET ORAL
Qty: 60 | Refills: 0
Start: 2022-10-04 | End: 2022-11-02

## 2022-10-04 RX ORDER — METOPROLOL TARTRATE 50 MG
1 TABLET ORAL
Qty: 60 | Refills: 0
Start: 2022-10-04 | End: 2022-11-02

## 2022-10-04 RX ORDER — ASPIRIN/CALCIUM CARB/MAGNESIUM 324 MG
1 TABLET ORAL
Qty: 30 | Refills: 0
Start: 2022-10-04 | End: 2022-11-02

## 2022-10-04 RX ORDER — ATORVASTATIN CALCIUM 80 MG/1
40 TABLET, FILM COATED ORAL AT BEDTIME
Refills: 0 | Status: DISCONTINUED | OUTPATIENT
Start: 2022-10-04 | End: 2022-10-04

## 2022-10-04 RX ORDER — FINASTERIDE 5 MG/1
1 TABLET, FILM COATED ORAL
Qty: 0 | Refills: 0 | DISCHARGE

## 2022-10-04 RX ORDER — OXYCODONE HYDROCHLORIDE 5 MG/1
1 TABLET ORAL
Qty: 20 | Refills: 0
Start: 2022-10-04 | End: 2022-10-08

## 2022-10-04 RX ORDER — ATORVASTATIN CALCIUM 80 MG/1
1 TABLET, FILM COATED ORAL
Qty: 30 | Refills: 0
Start: 2022-10-04 | End: 2022-11-02

## 2022-10-04 RX ORDER — FUROSEMIDE 40 MG
1 TABLET ORAL
Qty: 3 | Refills: 0
Start: 2022-10-04 | End: 2022-10-06

## 2022-10-04 RX ORDER — SPIRONOLACTONE 25 MG/1
1 TABLET, FILM COATED ORAL
Qty: 3 | Refills: 0
Start: 2022-10-04 | End: 2022-10-06

## 2022-10-04 RX ORDER — AMLODIPINE BESYLATE 2.5 MG/1
1 TABLET ORAL
Qty: 30 | Refills: 0
Start: 2022-10-04 | End: 2022-11-02

## 2022-10-04 RX ORDER — TAMSULOSIN HYDROCHLORIDE 0.4 MG/1
1 CAPSULE ORAL
Qty: 30 | Refills: 0
Start: 2022-10-04 | End: 2022-11-02

## 2022-10-04 RX ADMIN — CLOPIDOGREL BISULFATE 75 MILLIGRAM(S): 75 TABLET, FILM COATED ORAL at 15:15

## 2022-10-04 RX ADMIN — Medication 40 MILLIGRAM(S): at 05:48

## 2022-10-04 RX ADMIN — Medication 1: at 11:34

## 2022-10-04 RX ADMIN — OXYCODONE HYDROCHLORIDE 5 MILLIGRAM(S): 5 TABLET ORAL at 11:45

## 2022-10-04 RX ADMIN — OXYCODONE HYDROCHLORIDE 5 MILLIGRAM(S): 5 TABLET ORAL at 12:34

## 2022-10-04 RX ADMIN — Medication 1: at 17:06

## 2022-10-04 RX ADMIN — Medication 81 MILLIGRAM(S): at 11:33

## 2022-10-04 RX ADMIN — Medication 3 MILLILITER(S): at 17:07

## 2022-10-04 RX ADMIN — Medication 0.5 MILLIGRAM(S): at 05:01

## 2022-10-04 RX ADMIN — Medication 3 MILLILITER(S): at 11:33

## 2022-10-04 RX ADMIN — Medication 3 MILLILITER(S): at 05:01

## 2022-10-04 RX ADMIN — SPIRONOLACTONE 25 MILLIGRAM(S): 25 TABLET, FILM COATED ORAL at 05:48

## 2022-10-04 RX ADMIN — Medication 25 MILLIGRAM(S): at 17:07

## 2022-10-04 RX ADMIN — FINASTERIDE 5 MILLIGRAM(S): 5 TABLET, FILM COATED ORAL at 11:33

## 2022-10-04 RX ADMIN — CHLORHEXIDINE GLUCONATE 1 APPLICATION(S): 213 SOLUTION TOPICAL at 11:45

## 2022-10-04 RX ADMIN — AMLODIPINE BESYLATE 2.5 MILLIGRAM(S): 2.5 TABLET ORAL at 05:48

## 2022-10-04 RX ADMIN — Medication 25 MILLIGRAM(S): at 05:48

## 2022-10-04 RX ADMIN — PANTOPRAZOLE SODIUM 40 MILLIGRAM(S): 20 TABLET, DELAYED RELEASE ORAL at 05:50

## 2022-10-04 RX ADMIN — Medication 0.5 MILLIGRAM(S): at 17:07

## 2022-10-04 NOTE — PROGRESS NOTE ADULT - REASON FOR ADMISSION
NSTEMI

## 2022-10-04 NOTE — DISCHARGE NOTE PROVIDER - NSDCMRMEDTOKEN_GEN_ALL_CORE_FT
amLODIPine 2.5 mg oral tablet: 1 tab(s) orally once a day  aspirin 81 mg oral delayed release tablet: 1 tab(s) orally once a day  clopidogrel 75 mg oral tablet: 1 tab(s) orally once a day  finasteride 5 mg oral tablet: 1 tab(s) orally once a day  furosemide 40 mg oral tablet: 1 tab(s) orally once a day  metoprolol tartrate 25 mg oral tablet: 1 tab(s) orally 2 times a day  oxyCODONE 5 mg oral tablet: 1 tab(s) orally every 6 hours, As Needed -Moderate Pain (4 - 6) MDD:4  pantoprazole 40 mg oral delayed release tablet: 1 tab(s) orally once a day (before a meal)  senna leaf extract oral tablet: 2 tab(s) orally once a day (at bedtime)  spironolactone 25 mg oral tablet: 1 tab(s) orally once a day  tamsulosin 0.4 mg oral capsule: 1 cap(s) orally once a day (at bedtime)   amLODIPine 2.5 mg oral tablet: 1 tab(s) orally once a day  aspirin 81 mg oral delayed release tablet: 1 tab(s) orally once a day  atorvastatin 40 mg oral tablet: 1 tab(s) orally once a day (at bedtime)   clopidogrel 75 mg oral tablet: 1 tab(s) orally once a day  finasteride 5 mg oral tablet: 1 tab(s) orally once a day  furosemide 40 mg oral tablet: 1 tab(s) orally once a day  metoprolol tartrate 25 mg oral tablet: 1 tab(s) orally 2 times a day  oxyCODONE 5 mg oral tablet: 1 tab(s) orally every 6 hours, As Needed -Moderate Pain (4 - 6) MDD:4  pantoprazole 40 mg oral delayed release tablet: 1 tab(s) orally once a day (before a meal)  senna leaf extract oral tablet: 2 tab(s) orally once a day (at bedtime)  spironolactone 25 mg oral tablet: 1 tab(s) orally once a day  tamsulosin 0.4 mg oral capsule: 1 cap(s) orally once a day (at bedtime)

## 2022-10-04 NOTE — DISCHARGE NOTE PROVIDER - NSDCPNSUBOBJ_GEN_ALL_CORE
PHYSICAL EXAM:  Neurology: alert and oriented x 3, nonfocal, no gross deficits  CV : S1S2  Sternal Wound :  CDI , Stable  Lungs: cta  Abdomen: soft, nontender, nondistended, positive bowel sounds, last bowel movement      10/3   Extremities:     no c/c/e  l thigh incision cdi

## 2022-10-04 NOTE — DISCHARGE NOTE PROVIDER - CARE PROVIDER_API CALL
[place] [place])  CTS Surgery  [address] [place] [address]  Phone: [phone]  Fax: [phone]  Established Patient  Scheduled Appointment: 10/11/2022 01:00 PM

## 2022-10-04 NOTE — PROGRESS NOTE ADULT - PROBLEM SELECTOR PROBLEM 2
HTN (hypertension)
History of BPH
HTN (hypertension)
HTN (hypertension)

## 2022-10-04 NOTE — DISCHARGE NOTE PROVIDER - NSDCCPCAREPLAN_GEN_ALL_CORE_FT
PRINCIPAL DISCHARGE DIAGNOSIS  Diagnosis: S/P CABG x 3  Assessment and Plan of Treatment: Refer to your Cardiac Surgery Do's & Dont's Fact sheet  shower daily - wash your incision with mild soap and water  weigh yourself daily - report weight gain > 2.5 pounds overnight to your MD  take medications as prescribed  follow up appt with DR. Roosevelt Frod, cardiac surgeon, on October 11, tuesday @ 1pm  follow up appt with your Cardiologist and/or Primary Care MD in 3-4 weeks

## 2022-10-04 NOTE — DISCHARGE NOTE PROVIDER - NSDCFUSCHEDAPPT_GEN_ALL_CORE_FT
Viktor Crocker  [facility] Physician [facility]  UROLOGY [address]  Scheduled Appointment: 12/02/2022     [facility]  [facility]  CTSURG [address]  Scheduled Appointment: 10/11/2022    Viktor Crocker  [facility]  UROLOGY [address]  Scheduled Appointment: 12/02/2022

## 2022-10-04 NOTE — DISCHARGE NOTE NURSING/CASE MANAGEMENT/SOCIAL WORK - PATIENT PORTAL LINK FT
You can access the FollowMyHealth Patient Portal offered by [facility] by registering at the following website: [email or web address]. By joining [employer]’s FollowMyHealth portal, you will also be able to view your health information using other applications (apps) compatible with our system.

## 2022-10-04 NOTE — PROGRESS NOTE ADULT - PROBLEM SELECTOR PROBLEM 1
History of BPH
NSTEMI (non-ST elevation myocardial infarction)
History of BPH
History of BPH

## 2022-10-04 NOTE — PROGRESS NOTE ADULT - SUBJECTIVE AND OBJECTIVE BOX
VITAL SIGNS-Telemetry:    Vital Signs Last 24 Hrs  T(C): 37.2 (10-04-22 @ 04:43), Max: 37.2 (10-04-22 @ 04:43)  T(F): 98.9 (10-04-22 @ 04:43), Max: 98.9 (10-04-22 @ 04:43)  HR: 89 (10-04-22 @ 04:43) (81 - 89)  BP: 121/71 (10-04-22 @ 04:43) (110/65 - 126/79)  RR: 19 (10-04-22 @ 04:43) (18 - 19)  SpO2: 92% (10-04-22 @ 04:43) (92% - 95%)         10-03 @ 07:01  -  10-04 @ 07:00  --------------------------------------------------------  IN: 480 mL / OUT: 2000 mL / NET: -1520 mL    10-04 @ 07:01  -  10-04 @ 08:22  --------------------------------------------------------  IN: 0 mL / OUT: 1000 mL / NET: -1000 mL        Daily     Daily     CAPILLARY BLOOD GLUCOSE      POCT Blood Glucose.: 119 mg/dL (04 Oct 2022 07:39)  POCT Blood Glucose.: 115 mg/dL (03 Oct 2022 21:53)  POCT Blood Glucose.: 141 mg/dL (03 Oct 2022 16:38)  POCT Blood Glucose.: 104 mg/dL (03 Oct 2022 11:27)              PHYSICAL EXAM:  Neurology: alert and oriented x 3, nonfocal, no gross deficits  CV : S1S2  Sternal Wound :  CDI , Stable  Lungs: cta  Abdomen: soft, nontender, nondistended, positive bowel sounds, last bowel movement      19/3   Extremities:     no c/c/e  l thigh incision cdi     albuterol/ipratropium for Nebulization 3 milliLiter(s) Nebulizer every 6 hours  amLODIPine   Tablet 2.5 milliGRAM(s) Oral daily  aspirin enteric coated 81 milliGRAM(s) Oral daily  benzocaine 15 mG/menthol 3.6 mG Lozenge 1 Lozenge Oral every 6 hours PRN  bisacodyl Suppository 10 milliGRAM(s) Rectal once  buDESOnide    Inhalation Suspension 0.5 milliGRAM(s) Inhalation two times a day  chlorhexidine 2% Cloths 1 Application(s) Topical daily  enoxaparin Injectable 40 milliGRAM(s) SubCutaneous every 24 hours  finasteride 5 milliGRAM(s) Oral daily  furosemide    Tablet 40 milliGRAM(s) Oral daily  insulin lispro (ADMELOG) corrective regimen sliding scale   SubCutaneous three times a day before meals  insulin lispro (ADMELOG) corrective regimen sliding scale   SubCutaneous at bedtime  metoprolol tartrate 25 milliGRAM(s) Oral two times a day  oxyCODONE    IR 5 milliGRAM(s) Oral every 4 hours PRN  oxyCODONE    IR 10 milliGRAM(s) Oral every 4 hours PRN  pantoprazole    Tablet 40 milliGRAM(s) Oral before breakfast  polyethylene glycol 3350 17 Gram(s) Oral daily  senna 2 Tablet(s) Oral at bedtime  sodium chloride 0.65% Nasal 1 Spray(s) Both Nostrils three times a day PRN  sodium chloride 0.9%. 1000 milliLiter(s) IV Continuous <Continuous>  spironolactone 25 milliGRAM(s) Oral daily  tamsulosin 0.4 milliGRAM(s) Oral at bedtime    Physical Therapy Rec:   Home  [  ]   Home w/ PT  [ x ]  Rehab  [  ]  Discussed with Cardiothoracic Team at AM rounds.

## 2022-10-04 NOTE — PROGRESS NOTE ADULT - ASSESSMENT
71yo man with BPH, admitted with NSTEMI, s/p [facility] with multivessel CAD, undergoing CABG evaluation.   9/24 VSS: continue asa/ bb/ statin and hep gttp  9/27 s/p C3L/ LEFT RADIAL  postop inotropic and pressor support weaned off  cardene gttp for htn  9/28 extubated   postop hiflow o2 for postop hypoxia  + transaminitis  and --> amio erp and statin d/c; trend lft's   9/30 Tx sdu; VSS; RSR 60-70; pulm toilet - low grade temp 100.3 noted  d/c rij/ torito/ rt ct in am if pt stable and output minimal  10/1 VSS; RSR 70-80; rt ct d/c; ck f/u chest xray; d/c pw/ torito/ rij/ still; hiflow O2 weaned off- NC 4L; bronchodilators and pulmicort added; diuretics increased; increase activity as tolerated; LFT'S improved   discharge planning- home pt when stable   10/2  chest pt and ambulation. 93 on 3lnco2, IS 1000.  Pain management, bronchodilators  10/3  Transfer to floor.  Wean o2- remains on 2L  Gentle diuresis  Ambulation  10/4 VSS - O2 weened down to 2L - diuresing - prevena off - shower today.  will d/c home when O2 weened off ? Wednesday/thursday

## 2022-10-04 NOTE — PROGRESS NOTE ADULT - PROVIDER SPECIALTY LIST ADULT
Critical Care
Critical Care
CT Surgery
Cardiology
Critical Care
CT Surgery
Cardiology
Critical Care
Critical Care
CT Surgery
Internal Medicine
CT Surgery

## 2022-10-04 NOTE — DISCHARGE NOTE PROVIDER - HOSPITAL COURSE
71yo man with BPH, admitted with NSTEMI, s/p [facility] with multivessel CAD, undergoing CABG evaluation.   9/24 VSS: continue asa/ bb/ statin and hep gttp  9/27 s/p C3L/ LEFT RADIAL  postop inotropic and pressor support weaned off  cardene gttp for htn  9/28 extubated   postop hiflow o2 for postop hypoxia  + transaminitis  and --> amio erp and statin d/c; trend lft's   9/30 Tx sdu; VSS; RSR 60-70; pulm toilet - low grade temp 100.3 noted  d/c rij/ torito/ rt ct in am if pt stable and output minimal  10/1 VSS; RSR 70-80; rt ct d/c; ck f/u chest xray; d/c pw/ torito/ rij/ still; hiflow O2 weaned off- NC 4L; bronchodilators and pulmicort added; diuretics increased; increase activity as tolerated; LFT'S improved   discharge planning- home pt when stable   10/2  chest pt and ambulation. 93 on 3lnco2, IS 1000.  Pain management, bronchodilators  10/3  Transfer to floor.  Wean o2- remains on 2L  Gentle diuresis  Ambulation  10/4 VSS - prevena removed - inc cdi - weened off O2 as room air saturation + 100% - start plavix 75 qd as pt had an NSTEMI preop. 73yo man with BPH, admitted with NSTEMI, s/p [facility] with multivessel CAD, undergoing CABG evaluation.   9/24 VSS: continue asa/ bb/ statin and hep gttp  9/27 s/p C3L/ LEFT RADIAL  postop inotropic and pressor support weaned off  cardene gttp for htn  9/28 extubated   postop hiflow o2 for postop hypoxia  + transaminitis  and --> amio erp and statin d/c; trend lft's   9/30 Tx sdu; VSS; RSR 60-70; pulm toilet - low grade temp 100.3 noted  d/c rij/ torito/ rt ct in am if pt stable and output minimal  10/1 VSS; RSR 70-80; rt ct d/c; ck f/u chest xray; d/c pw/ torito/ rij/ still; hiflow O2 weaned off- NC 4L; bronchodilators and pulmicort added; diuretics increased; increase activity as tolerated; LFT'S improved   discharge planning- home pt when stable   10/2  chest pt and ambulation. 93 on 3lnco2, IS 1000.  Pain management, bronchodilators  10/3  Transfer to floor.  Wean o2- remains on 2L  Gentle diuresis  Ambulation  10/4 VSS - prevena removed - inc cdi - weened off O2 as room air saturation + 100% - start plavix 75 qd as pt had an NSTEMI preop.  STatin resumed today as ALT is down to 49 from 106.

## 2022-10-04 NOTE — DISCHARGE NOTE NURSING/CASE MANAGEMENT/SOCIAL WORK - NSDCPEFALRISK_GEN_ALL_CORE
For information on Fall & Injury Prevention, visit: [email or web address] OR  [email or web address] OR  https://www.cdc.gov/steadi/patient.html

## 2022-10-04 NOTE — DISCHARGE NOTE PROVIDER - PROVIDER TOKENS
PROVIDER:[TOKEN:[[id number]],SCHEDULEDAPPT:[10/11/2022],SCHEDULEDAPPTTIME:[01:00 PM],ESTABLISHEDPATIENT:[T]]

## 2022-10-05 ENCOUNTER — APPOINTMENT (OUTPATIENT)
Dept: CARE COORDINATION | Facility: HOME HEALTH | Age: 72
End: 2022-10-05

## 2022-10-05 VITALS — WEIGHT: 180.78 LBS | BODY MASS INDEX: 26.7 KG/M2

## 2022-10-05 PROCEDURE — 99024 POSTOP FOLLOW-UP VISIT: CPT

## 2022-10-05 RX ORDER — SPIRONOLACTONE 25 MG/1
25 TABLET ORAL
Refills: 0 | Status: ACTIVE | COMMUNITY
Start: 2022-10-05

## 2022-10-05 RX ORDER — OXYCODONE 5 MG/1
5 TABLET ORAL
Refills: 0 | Status: ACTIVE | COMMUNITY
Start: 2022-10-05

## 2022-10-05 RX ORDER — TAMSULOSIN HYDROCHLORIDE 0.4 MG/1
CAPSULE ORAL
Refills: 0 | Status: DISCONTINUED | COMMUNITY
End: 2022-10-05

## 2022-10-05 RX ORDER — FINASTERIDE 1 MG/1
TABLET ORAL
Refills: 0 | Status: DISCONTINUED | COMMUNITY
End: 2022-10-05

## 2022-10-05 RX ORDER — ATORVASTATIN CALCIUM 40 MG/1
40 TABLET, FILM COATED ORAL
Refills: 0 | Status: ACTIVE | COMMUNITY
Start: 2022-10-05

## 2022-10-05 RX ORDER — METOPROLOL TARTRATE 25 MG/1
25 TABLET, FILM COATED ORAL
Refills: 0 | Status: ACTIVE | COMMUNITY
Start: 2022-10-05

## 2022-10-05 RX ORDER — PANTOPRAZOLE 40 MG/1
40 TABLET, DELAYED RELEASE ORAL
Refills: 0 | Status: ACTIVE | COMMUNITY
Start: 2022-10-05

## 2022-10-05 RX ORDER — FUROSEMIDE 40 MG/1
40 TABLET ORAL
Refills: 0 | Status: ACTIVE | COMMUNITY
Start: 2022-10-05

## 2022-10-05 RX ORDER — CLOPIDOGREL BISULFATE 75 MG/1
75 TABLET, FILM COATED ORAL
Refills: 0 | Status: ACTIVE | COMMUNITY
Start: 2022-10-05

## 2022-10-05 RX ORDER — ASPIRIN 195 MG
176 SUPPOSITORY, RECTAL RECTAL
Refills: 0 | Status: ACTIVE | COMMUNITY
Start: 2022-10-05

## 2022-10-05 RX ORDER — AMLODIPINE BESYLATE 2.5 MG/1
2.5 TABLET ORAL
Refills: 0 | Status: ACTIVE | COMMUNITY
Start: 2022-10-05

## 2022-10-05 RX ORDER — ASPIRIN 81 MG/1
81 TABLET ORAL
Refills: 0 | Status: ACTIVE | COMMUNITY
Start: 2022-10-05

## 2022-10-05 NOTE — PHYSICAL EXAM
[Examination Of The Chest] : the chest was normal in appearance [FreeTextEntry1] : MSI and CT sites without erythema, drainage or warmth, with edges well approximated. Sternum stable\par \par   [Abnormal Walk] : normal gait [Musculoskeletal - Swelling] : no joint swelling seen [Skin Color & Pigmentation] : normal skin color and pigmentation [] : no rash [No Focal Deficits] : no focal deficits [Oriented To Time, Place, And Person] : oriented to person, place, and time [Impaired Insight] : insight and judgment were intact [Affect] : the affect was normal

## 2022-10-05 NOTE — HISTORY OF PRESENT ILLNESS
[FreeTextEntry1] : 71yo man with BPH, admitted with NSTEMI, s/p [facility] with multivessel CAD,\par undergoing CABG evaluation.\par 9/24 VSS: continue asa/ bb/ statin and hep gttp\par 9/27 s/p C3L/ LEFT RADIAL\par postop inotropic and pressor support weaned off\par cardene gttp for htn\par 9/28 extubated\par postop hiflow o2 for postop hypoxia\par + transaminitis  and --> amio erp and statin d/c; trend lft's\par 9/30 Tx sdu; VSS; RSR 60-70; pulm toilet - low grade temp 100.3 noted\par d/c rij/ torito/ rt ct in am if pt stable and output minimal\par 10/1 VSS; RSR 70-80; rt ct d/c; ck f/u chest xray; d/c pw/ torito/ rij/ still;\par hiflow O2 weaned off- NC 4L; bronchodilators and pulmicort added; diuretics\par increased; increase activity as tolerated; LFT'S improved\par discharge planning- home pt when stable\par 10/2 chest pt and ambulation. 93 on 3lnco2, IS 1000.\par Pain management, bronchodilators\par 10/3 Transfer to floor.\par Wean o2- remains on 2L\par Gentle diuresis\par Ambulation\par 10/4 VSS - prevena removed - inc cdi - weened off O2 as room air saturation +\par 100% - start plavix 75 qd as pt had an NSTEMI preop. STatin resumed today as\par ALT is down to 49 from 106.\par discharged stable to home.

## 2022-10-05 NOTE — ASSESSMENT
[FreeTextEntry1] : Pt recovering well at home s/p cabg  . Reviewed all medications and dosages with pt understanding. Pt dispenses meds appropriately into med dispenser each week. Pt has all medications in home and is taking as prescribed. Denies pain at this time. No new symptoms, issues or concerns to report at this time. Appt schedule reviewed with pt verbalizing understanding.\par

## 2022-10-11 ENCOUNTER — APPOINTMENT (OUTPATIENT)
Dept: CARDIOTHORACIC SURGERY | Facility: CLINIC | Age: 72
End: 2022-10-11

## 2022-10-11 VITALS
BODY MASS INDEX: 25.48 KG/M2 | TEMPERATURE: 98.2 F | DIASTOLIC BLOOD PRESSURE: 80 MMHG | WEIGHT: 172 LBS | RESPIRATION RATE: 16 BRPM | HEART RATE: 78 BPM | HEIGHT: 69 IN | SYSTOLIC BLOOD PRESSURE: 116 MMHG | OXYGEN SATURATION: 98 %

## 2022-10-11 PROCEDURE — 99024 POSTOP FOLLOW-UP VISIT: CPT

## 2022-10-17 ENCOUNTER — TRANSCRIPTION ENCOUNTER (OUTPATIENT)
Age: 72
End: 2022-10-17

## 2022-10-25 ENCOUNTER — APPOINTMENT (OUTPATIENT)
Dept: CARDIOLOGY | Facility: CLINIC | Age: 72
End: 2022-10-25

## 2022-11-04 ENCOUNTER — TRANSCRIPTION ENCOUNTER (OUTPATIENT)
Age: 72
End: 2022-11-04

## 2022-11-05 ENCOUNTER — TRANSCRIPTION ENCOUNTER (OUTPATIENT)
Age: 72
End: 2022-11-05

## 2022-11-08 PROCEDURE — 85730 THROMBOPLASTIN TIME PARTIAL: CPT

## 2022-11-08 PROCEDURE — 80176 ASSAY OF LIDOCAINE: CPT

## 2022-11-08 PROCEDURE — C1769: CPT

## 2022-11-08 PROCEDURE — 99285 EMERGENCY DEPT VISIT HI MDM: CPT | Mod: 25

## 2022-11-08 PROCEDURE — 36415 COLL VENOUS BLD VENIPUNCTURE: CPT

## 2022-11-08 PROCEDURE — 85384 FIBRINOGEN ACTIVITY: CPT

## 2022-11-08 PROCEDURE — 87637 SARSCOV2&INF A&B&RSV AMP PRB: CPT

## 2022-11-08 PROCEDURE — 80061 LIPID PANEL: CPT

## 2022-11-08 PROCEDURE — 82947 ASSAY GLUCOSE BLOOD QUANT: CPT

## 2022-11-08 PROCEDURE — C1894: CPT

## 2022-11-08 PROCEDURE — 97110 THERAPEUTIC EXERCISES: CPT

## 2022-11-08 PROCEDURE — 94010 BREATHING CAPACITY TEST: CPT

## 2022-11-08 PROCEDURE — 84295 ASSAY OF SERUM SODIUM: CPT

## 2022-11-08 PROCEDURE — 85027 COMPLETE CBC AUTOMATED: CPT

## 2022-11-08 PROCEDURE — 84443 ASSAY THYROID STIM HORMONE: CPT

## 2022-11-08 PROCEDURE — 86901 BLOOD TYPING SEROLOGIC RH(D): CPT

## 2022-11-08 PROCEDURE — 93306 TTE W/DOPPLER COMPLETE: CPT

## 2022-11-08 PROCEDURE — P9047: CPT

## 2022-11-08 PROCEDURE — 85014 HEMATOCRIT: CPT

## 2022-11-08 PROCEDURE — 85025 COMPLETE CBC W/AUTO DIFF WBC: CPT

## 2022-11-08 PROCEDURE — 97162 PT EVAL MOD COMPLEX 30 MIN: CPT

## 2022-11-08 PROCEDURE — 85396 CLOTTING ASSAY WHOLE BLOOD: CPT

## 2022-11-08 PROCEDURE — 85576 BLOOD PLATELET AGGREGATION: CPT

## 2022-11-08 PROCEDURE — 85610 PROTHROMBIN TIME: CPT

## 2022-11-08 PROCEDURE — 86891 AUTOLOGOUS BLOOD OP SALVAGE: CPT

## 2022-11-08 PROCEDURE — 85018 HEMOGLOBIN: CPT

## 2022-11-08 PROCEDURE — 80053 COMPREHEN METABOLIC PANEL: CPT

## 2022-11-08 PROCEDURE — 86803 HEPATITIS C AB TEST: CPT

## 2022-11-08 PROCEDURE — 82550 ASSAY OF CK (CPK): CPT

## 2022-11-08 PROCEDURE — 82553 CREATINE MB FRACTION: CPT

## 2022-11-08 PROCEDURE — 85520 HEPARIN ASSAY: CPT

## 2022-11-08 PROCEDURE — 97166 OT EVAL MOD COMPLEX 45 MIN: CPT

## 2022-11-08 PROCEDURE — 82330 ASSAY OF CALCIUM: CPT

## 2022-11-08 PROCEDURE — 93454 CORONARY ARTERY ANGIO S&I: CPT

## 2022-11-08 PROCEDURE — 94002 VENT MGMT INPAT INIT DAY: CPT

## 2022-11-08 PROCEDURE — C1751: CPT

## 2022-11-08 PROCEDURE — 83605 ASSAY OF LACTIC ACID: CPT

## 2022-11-08 PROCEDURE — C1887: CPT

## 2022-11-08 PROCEDURE — 82803 BLOOD GASES ANY COMBINATION: CPT

## 2022-11-08 PROCEDURE — P9041: CPT

## 2022-11-08 PROCEDURE — 93005 ELECTROCARDIOGRAM TRACING: CPT

## 2022-11-08 PROCEDURE — 87641 MR-STAPH DNA AMP PROBE: CPT

## 2022-11-08 PROCEDURE — 84100 ASSAY OF PHOSPHORUS: CPT

## 2022-11-08 PROCEDURE — 97116 GAIT TRAINING THERAPY: CPT

## 2022-11-08 PROCEDURE — 94640 AIRWAY INHALATION TREATMENT: CPT

## 2022-11-08 PROCEDURE — 71045 X-RAY EXAM CHEST 1 VIEW: CPT

## 2022-11-08 PROCEDURE — 83036 HEMOGLOBIN GLYCOSYLATED A1C: CPT

## 2022-11-08 PROCEDURE — P9045: CPT

## 2022-11-08 PROCEDURE — 86900 BLOOD TYPING SEROLOGIC ABO: CPT

## 2022-11-08 PROCEDURE — C1889: CPT

## 2022-11-08 PROCEDURE — 82565 ASSAY OF CREATININE: CPT

## 2022-11-08 PROCEDURE — 83735 ASSAY OF MAGNESIUM: CPT

## 2022-11-08 PROCEDURE — 87640 STAPH A DNA AMP PROBE: CPT

## 2022-11-08 PROCEDURE — 93880 EXTRACRANIAL BILAT STUDY: CPT

## 2022-11-08 PROCEDURE — 82962 GLUCOSE BLOOD TEST: CPT

## 2022-11-08 PROCEDURE — 84132 ASSAY OF SERUM POTASSIUM: CPT

## 2022-11-08 PROCEDURE — 86923 COMPATIBILITY TEST ELECTRIC: CPT

## 2022-11-08 PROCEDURE — U0005: CPT

## 2022-11-08 PROCEDURE — 82435 ASSAY OF BLOOD CHLORIDE: CPT

## 2022-11-08 PROCEDURE — U0003: CPT

## 2022-11-08 PROCEDURE — 86850 RBC ANTIBODY SCREEN: CPT

## 2022-11-08 PROCEDURE — 80048 BASIC METABOLIC PNL TOTAL CA: CPT

## 2022-11-08 PROCEDURE — 84484 ASSAY OF TROPONIN QUANT: CPT

## 2022-11-08 PROCEDURE — 81001 URINALYSIS AUTO W/SCOPE: CPT

## 2022-12-02 ENCOUNTER — APPOINTMENT (OUTPATIENT)
Dept: UROLOGY | Facility: CLINIC | Age: 72
End: 2022-12-02

## 2023-01-24 ENCOUNTER — APPOINTMENT (OUTPATIENT)
Dept: UROLOGY | Facility: CLINIC | Age: 73
End: 2023-01-24
Payer: MEDICARE

## 2023-01-24 VITALS
DIASTOLIC BLOOD PRESSURE: 78 MMHG | HEART RATE: 74 BPM | HEIGHT: 69 IN | TEMPERATURE: 98 F | SYSTOLIC BLOOD PRESSURE: 126 MMHG | OXYGEN SATURATION: 95 % | BODY MASS INDEX: 25.48 KG/M2 | WEIGHT: 172 LBS

## 2023-01-24 DIAGNOSIS — R31.29 OTHER MICROSCOPIC HEMATURIA: ICD-10-CM

## 2023-01-24 PROCEDURE — 99214 OFFICE O/P EST MOD 30 MIN: CPT

## 2023-01-24 NOTE — HISTORY OF PRESENT ILLNESS
[FreeTextEntry1] : Very pleasant 72-year-old gentleman who presents for follow-up of BPH, elevated PSA, kidney stones.  He was previously noted to have a PSA of 4.36 in the setting of microscopic hematuria.  Because of this he underwent a CT scan which demonstrated large kidney stones measuring up to 2.4 cm.  He was recommended to undergo treatment at that time, however he elected to forego this.  He continues to deny flank pain.  He reports that tamsulosin and finasteride significantly improve his urinary symptoms.  On CT scan prostate was noted to be enlarged at 195 cc.  He presents today for refills on his medications.

## 2023-01-24 NOTE — ASSESSMENT
[FreeTextEntry1] : Very pleasant 72-year-old gentleman who presents for follow-up of BPH, elevated PSA, kidney stones\par -Urinalysis\par -Urine culture\par -Renal ultrasound\par -Patient reports that he recently underwent cardiac surgery and therefore wishes to forego treatment of kidney stones at this time.  We discussed the large size of his stones, as well as likely growth and stones over time, however he still wishes to forego treatment\par -Continue tamsulosin–refill sent to the pharmacy\par -Continue finasteride–refill sent to the pharmacy follow-up for renal ultrasound\par -

## 2023-01-25 LAB
APPEARANCE: CLEAR
BACTERIA: NEGATIVE
BILIRUBIN URINE: NEGATIVE
BLOOD URINE: ABNORMAL
COLOR: NORMAL
GLUCOSE QUALITATIVE U: NEGATIVE
HYALINE CASTS: 1 /LPF
KETONES URINE: NEGATIVE
LEUKOCYTE ESTERASE URINE: NEGATIVE
MICROSCOPIC-UA: NORMAL
NITRITE URINE: NEGATIVE
PH URINE: 7
PROTEIN URINE: NEGATIVE
RED BLOOD CELLS URINE: 7 /HPF
SPECIFIC GRAVITY URINE: 1.02
SQUAMOUS EPITHELIAL CELLS: 3 /HPF
UROBILINOGEN URINE: NORMAL
WHITE BLOOD CELLS URINE: 1 /HPF

## 2023-01-27 LAB — BACTERIA UR CULT: NORMAL

## 2023-02-14 ENCOUNTER — APPOINTMENT (OUTPATIENT)
Dept: UROLOGY | Facility: CLINIC | Age: 73
End: 2023-02-14
Payer: MEDICARE

## 2023-02-14 DIAGNOSIS — N20.0 CALCULUS OF KIDNEY: ICD-10-CM

## 2023-02-14 DIAGNOSIS — N40.0 BENIGN PROSTATIC HYPERPLASIA WITHOUT LOWER URINARY TRACT SYMPMS: ICD-10-CM

## 2023-02-14 DIAGNOSIS — Z95.1 PRESENCE OF AORTOCORONARY BYPASS GRAFT: ICD-10-CM

## 2023-02-14 DIAGNOSIS — R97.20 ELEVATED PROSTATE, SPECIFIC ANTIGEN [PSA]: ICD-10-CM

## 2023-02-14 PROCEDURE — 99213 OFFICE O/P EST LOW 20 MIN: CPT

## 2023-02-14 PROCEDURE — 76775 US EXAM ABDO BACK WALL LIM: CPT

## 2023-02-14 NOTE — HISTORY OF PRESENT ILLNESS
[FreeTextEntry1] : Very pleasant 72-year-old gentleman who presents for follow-up of BPH, elevated PSA, kidney stones.  He was previously noted to have a PSA of 4.36 in the setting of microscopic hematuria.  Because of this he underwent a CT scan which demonstrated large kidney stones measuring up to 2.4 cm.  He was recommended to undergo treatment at that time, however he elected to forego this.  He continues to deny flank pain.  He reports that tamsulosin and finasteride continue to significantly improve his urinary symptoms.  On CT scan prostate was noted to be enlarged at 195 cc.  He underwent a repeat ultrasound today for surveillance of stones demonstrating a significantly large left intrarenal stone but with no hydronephrosis.  He continues to wish to forego treatment of this.

## 2023-02-14 NOTE — ASSESSMENT
[FreeTextEntry1] : Very pleasant 72-year-old gentleman who presents for follow-up of kidney stones, BPH, elevated PSA\par -PSA most recently 4.36 from June 2022\par -CT scan demonstrates an approximately 2.3 to 2.4 cm left partial staghorn stone\par -Renal ultrasound today demonstrates approximately 2.4 cm aggregate of stone burden in the left kidney\par -We again discussed options for management and I encouraged him to consider treatment, however given that he does not pain he wishes to forego this at this time\par -Repeat renal ultrasound in 6 months with PSA at that time

## 2023-04-25 ENCOUNTER — APPOINTMENT (OUTPATIENT)
Dept: UROLOGY | Facility: CLINIC | Age: 73
End: 2023-04-25

## 2023-07-18 ENCOUNTER — APPOINTMENT (OUTPATIENT)
Dept: UROLOGY | Facility: CLINIC | Age: 73
End: 2023-07-18

## 2023-08-08 ENCOUNTER — RX RENEWAL (OUTPATIENT)
Age: 73
End: 2023-08-08

## 2023-08-08 RX ORDER — FINASTERIDE 5 MG/1
5 TABLET, FILM COATED ORAL DAILY
Qty: 90 | Refills: 1 | Status: ACTIVE | COMMUNITY
Start: 2021-09-21 | End: 1900-01-01

## 2023-09-27 ENCOUNTER — RX RENEWAL (OUTPATIENT)
Age: 73
End: 2023-09-27

## 2023-09-27 RX ORDER — TAMSULOSIN HYDROCHLORIDE 0.4 MG/1
0.4 CAPSULE ORAL
Qty: 90 | Refills: 0 | Status: ACTIVE | COMMUNITY
Start: 2021-09-21 | End: 1900-01-01

## 2024-04-17 NOTE — PROGRESS NOTE ADULT - PROBLEM SELECTOR PLAN 3
continue postop care  continue asa and bb   no statin at this time secondary to transaminitis; lft's improving   c/w norvasc for radial graft and htn  d/c torito/ joslyn/ tessy/ and sammi this am   d/c rt chest tube; ck followup chest xray  bronchodilators/ pulmicort   bowel regimen  diuresis  pulm toilet  pain management  increase activity as tolerated  Discharge planning- home pt when stable abdominal pain

## 2024-04-29 ENCOUNTER — RX RENEWAL (OUTPATIENT)
Age: 74
End: 2024-04-29

## 2025-09-09 ENCOUNTER — NON-APPOINTMENT (OUTPATIENT)
Age: 75
End: 2025-09-09

## 2025-09-10 ENCOUNTER — NON-APPOINTMENT (OUTPATIENT)
Age: 75
End: 2025-09-10

## 2025-09-10 ENCOUNTER — APPOINTMENT (OUTPATIENT)
Dept: CARDIOLOGY | Facility: CLINIC | Age: 75
End: 2025-09-10
Payer: MEDICARE

## 2025-09-10 VITALS
DIASTOLIC BLOOD PRESSURE: 82 MMHG | OXYGEN SATURATION: 96 % | HEART RATE: 64 BPM | WEIGHT: 170 LBS | TEMPERATURE: 98 F | SYSTOLIC BLOOD PRESSURE: 134 MMHG | BODY MASS INDEX: 25.1 KG/M2

## 2025-09-10 DIAGNOSIS — I10 ESSENTIAL (PRIMARY) HYPERTENSION: ICD-10-CM

## 2025-09-10 DIAGNOSIS — Z95.1 PRESENCE OF AORTOCORONARY BYPASS GRAFT: ICD-10-CM

## 2025-09-10 DIAGNOSIS — E78.00 PURE HYPERCHOLESTEROLEMIA, UNSPECIFIED: ICD-10-CM

## 2025-09-10 DIAGNOSIS — N40.0 BENIGN PROSTATIC HYPERPLASIA WITHOUT LOWER URINARY TRACT SYMPMS: ICD-10-CM

## 2025-09-10 PROCEDURE — 99213 OFFICE O/P EST LOW 20 MIN: CPT | Mod: 25

## 2025-09-10 PROCEDURE — 93000 ELECTROCARDIOGRAM COMPLETE: CPT

## (undated) DEVICE — PACK CUSTOM W/INSPIRE OXYGENATOR

## (undated) DEVICE — SOL IRR BAG NS 0.9% 3000ML

## (undated) DEVICE — SUT POLYSORB 4-0 30" CVF-23

## (undated) DEVICE — MARKING PEN W RULER

## (undated) DEVICE — DRSG KLING 6"

## (undated) DEVICE — SUT STAINLESS STEEL 5 18" SCC

## (undated) DEVICE — SUT BIOSYN 4-0 18" P-12

## (undated) DEVICE — DRSG STOCKINETTE IMPERVIOUS XL

## (undated) DEVICE — DRAPE TOWEL BLUE 17" X 24"

## (undated) DEVICE — GLV 8 PROTEXIS (WHITE)

## (undated) DEVICE — GETINGE VASOVIEW 7 ENDOSCOPIC VESSEL HARVESTING SYSTEM

## (undated) DEVICE — NDL HYPO SAFE 18G X 1.5" (PINK)

## (undated) DEVICE — SUT PROLENE 6-0 4-30" C-1

## (undated) DEVICE — POSITIONER CARDIAC BUMP

## (undated) DEVICE — CONNECTOR INTERSEPT "Y" 1/4 X 1/4 X 1/4"

## (undated) DEVICE — SUT SURGICAL STEEL 6 30" BP-1

## (undated) DEVICE — STAPLER SKIN VISI-STAT 35 WIDE

## (undated) DEVICE — STEALTH CLAMP INSERT FIBRA/FIBRA 90MM

## (undated) DEVICE — SOL ANTI FOG

## (undated) DEVICE — SUT POLYSORB 0 36" GS-25 UNDYED

## (undated) DEVICE — SUT PROLENE 4-0 36" BB

## (undated) DEVICE — WARMING BLANKET DUO-THERM HYPER/HYPOTHERM ADULT

## (undated) DEVICE — WARMING BLANKET UPPER ADULT

## (undated) DEVICE — SUT BLUNT SZ 5

## (undated) DEVICE — GOWN XXXL

## (undated) DEVICE — DRAPE SLUSH / WARMER 44 X 66"

## (undated) DEVICE — DRAPE 1/2 SHEET 40X57"

## (undated) DEVICE — FOLEY TRAY 16FR 5CC LF LUBRISIL ADVANCE TEMP CLOSED

## (undated) DEVICE — DRAPE IOBAN 23" X 23"

## (undated) DEVICE — PACK UNIVERSAL CARDIAC SUPPLEMNTAL B

## (undated) DEVICE — SUT SOFSILK 2 60" TIES

## (undated) DEVICE — GLV 7.5 PROTEXIS (WHITE)

## (undated) DEVICE — SUT TICRON 5 30" KV-40

## (undated) DEVICE — SUT POLYSORB 2-0 30" GS-21 UNDYED

## (undated) DEVICE — AORTIC PUNCH 4.0MM LONG LENGTH HANDLE

## (undated) DEVICE — SOL NORMOSOL-R PH7.4 1000ML

## (undated) DEVICE — CONNECTOR STRAIGHT 3/8 X 1/2"

## (undated) DEVICE — POSITIONER FOAM EGG CRATE ULNAR 2PCS (PINK)

## (undated) DEVICE — NDL COUNTER FOAM AND MAGNET 40-70

## (undated) DEVICE — MEDICATION LABELS W MARKER

## (undated) DEVICE — ELCTR BOVIE TIP BLADE VALLEYLAB 6.5"

## (undated) DEVICE — BEAVER BLADE MINI SHARP ALL ROUND (BLUE)

## (undated) DEVICE — DRSG OPSITE 13.75 X 4"

## (undated) DEVICE — SPECIMEN CONTAINER 100ML

## (undated) DEVICE — SUT SOFSILK 0 30" V-20

## (undated) DEVICE — GLV 8 PROTEXIS ORTHO (CREAM)

## (undated) DEVICE — SOL IRR POUR NS 0.9% 500ML

## (undated) DEVICE — DRSG TEGADERM 6"X8"

## (undated) DEVICE — DRAPE LIGHT HANDLE COVER (BLUE)

## (undated) DEVICE — SUT POLYSORB 2 30" GS-26

## (undated) DEVICE — SUMP PERICARDIAL 20FR 1/4" ADULT

## (undated) DEVICE — TUBING TRUWAVE PRESSURE MALE/FEMALE 72"

## (undated) DEVICE — PACK UNIVERSAL CARDIAC

## (undated) DEVICE — TUBING KIT FAST START ATF 40

## (undated) DEVICE — SUT PROLENE 7-0 4-24" BV-1

## (undated) DEVICE — GLV 6.5 PROTEXIS (WHITE)

## (undated) DEVICE — WARMING BLANKET FULL ADULT

## (undated) DEVICE — VASOVIEW HEMOPRO 2

## (undated) DEVICE — SYR LUER LOK 50CC

## (undated) DEVICE — SUCTION CATH ARGYLE WHISTLE TIP 14FR STRAIGHT PACKED

## (undated) DEVICE — DRSG ACE BANDAGE 6"

## (undated) DEVICE — SUT PROLENE 3-0 36" SH

## (undated) DEVICE — STEALTH CLAMP INSERT FIBRA/FIBRA 60MM

## (undated) DEVICE — PREP CHLORAPREP HI-LITE ORANGE 26ML

## (undated) DEVICE — AORTIC PUNCH 5MM STANDARD HANDLE

## (undated) DEVICE — NDL BLUNT 18G LOOP VESSEL MAXI WHITE

## (undated) DEVICE — DRAPE 3/4 SHEET W REINFORCEMENT 56X77"

## (undated) DEVICE — SYR LUER LOK 3CC

## (undated) DEVICE — DRAIN CHANNEL 19FR ROUND FULL FLUTED

## (undated) DEVICE — DRSG PREVENA PEEL & PLACE KIT 20CM

## (undated) DEVICE — SUT DOUBLE 6 WIRE STERNAL

## (undated) DEVICE — SUCTION YANKAUER NO CONTROL VENT

## (undated) DEVICE — TOURNIQUET SET 12FR (1 RED, 1 BLUE, 1 SNARE) 7"

## (undated) DEVICE — SOL IRR POUR H2O 250ML

## (undated) DEVICE — LAP PAD 18 X 18"

## (undated) DEVICE — SUT PROLENE 8-0 24" BV175-6

## (undated) DEVICE — BLADE SCALPEL SAFETYLOCK #10

## (undated) DEVICE — DRSG STERISTRIPS 0.5 X 4"

## (undated) DEVICE — BLADE SCALPEL SAFETYLOCK #11

## (undated) DEVICE — GOWN TRIMAX XXL

## (undated) DEVICE — DRAPE MAYO STAND 30"

## (undated) DEVICE — TUBING ATS SUCTION LINE

## (undated) DEVICE — VENODYNE/SCD SLEEVE CALF MEDIUM

## (undated) DEVICE — BULLDOG SPRING CLIP 6MM SOFT/SOFT

## (undated) DEVICE — SYR LUER LOK 20CC

## (undated) DEVICE — SENSOR MYOCARDIAL TEMP 15MM

## (undated) DEVICE — SYS VEIN HARVESTING VIRTUOSAPH PLUS W/ RADIAL

## (undated) DEVICE — BLADE SCALPEL SAFETYLOCK #15

## (undated) DEVICE — AORTIC PUNCH 4.0MM STANDARD HANDLE

## (undated) DEVICE — TUBING INSUFFLATION LAP FILTER 10FT

## (undated) DEVICE — STRYKER INTERPULSE HANDPIECE W IRR SUCTION TUBE

## (undated) DEVICE — VENTING ADAPTER "Y" (RED/BLUE) 7.5"

## (undated) DEVICE — MULTIPLE PERFUSION SET FEMALE 1 INLET LEG W 4 LEGS 15" (BLUE/RED)

## (undated) DEVICE — CONNECTOR "Y" 1/2 X 3/8 X 3/8"

## (undated) DEVICE — SUT PLEDGET PRE PUNCH 4.8 X 9.5 X 1.5 MM

## (undated) DEVICE — GOWN TRIMAX LG

## (undated) DEVICE — SYNOVIS VASCULAR PROBE 1.5MM 15CM

## (undated) DEVICE — SYR LUER LOK 30CC

## (undated) DEVICE — DRAIN RESERVOIR FOR JACKSON PRATT 100CC CARDINAL

## (undated) DEVICE — FILTER REINFUSION FOR SALVAGED BLOOD DISP

## (undated) DEVICE — PACING CABLE (BROWN) A/V TEMP SCREW DOWN 12FT

## (undated) DEVICE — DRSG DERMABOND PRINEO 60CM

## (undated) DEVICE — PHRENIC NERVE PAD MEDIUM

## (undated) DEVICE — DRAIN CHANNEL 32FR ROUND HUBLESS FULL FLUTED

## (undated) DEVICE — VESSEL LOOP MAXI-RED  0.120" X 16"

## (undated) DEVICE — VISITEC 4X4

## (undated) DEVICE — DRAPE INSTRUMENT POUCH 6.75" X 11"

## (undated) DEVICE — SUT PROLENE 4-0 36" RB-1

## (undated) DEVICE — SAW BLADE MICROAIRE STERNUM 1X34X9.4MM

## (undated) DEVICE — GLV 8.5 PROTEXIS (WHITE)

## (undated) DEVICE — FEEDING TUBE NG SUMP 16FR 48"

## (undated) DEVICE — SUT PROLENE 7-0 24" BV175-6

## (undated) DEVICE — SYR ASEPTO

## (undated) DEVICE — DRAIN PLEUROVAC

## (undated) DEVICE — TUBING SUCTION 20FT

## (undated) DEVICE — DRSG OPSITE 2.5 X 2"

## (undated) DEVICE — GLV 7 PROTEXIS (WHITE)

## (undated) DEVICE — TUBING TUR 2 PRONG